# Patient Record
Sex: FEMALE | Race: OTHER | HISPANIC OR LATINO | Employment: UNEMPLOYED | URBAN - METROPOLITAN AREA
[De-identification: names, ages, dates, MRNs, and addresses within clinical notes are randomized per-mention and may not be internally consistent; named-entity substitution may affect disease eponyms.]

---

## 2018-08-14 ENCOUNTER — TELEPHONE (OUTPATIENT)
Dept: OBSTETRICS AND GYNECOLOGY | Facility: CLINIC | Age: 23
End: 2018-08-14

## 2018-08-14 ENCOUNTER — OFFICE VISIT (OUTPATIENT)
Dept: OBSTETRICS AND GYNECOLOGY | Facility: CLINIC | Age: 23
End: 2018-08-14
Payer: MEDICAID

## 2018-08-14 ENCOUNTER — LAB VISIT (OUTPATIENT)
Dept: LAB | Facility: HOSPITAL | Age: 23
End: 2018-08-14
Attending: OBSTETRICS & GYNECOLOGY
Payer: MEDICAID

## 2018-08-14 VITALS
HEIGHT: 65 IN | WEIGHT: 157.63 LBS | BODY MASS INDEX: 26.26 KG/M2 | DIASTOLIC BLOOD PRESSURE: 68 MMHG | SYSTOLIC BLOOD PRESSURE: 100 MMHG

## 2018-08-14 DIAGNOSIS — Z34.90 EARLY STAGE OF PREGNANCY: ICD-10-CM

## 2018-08-14 DIAGNOSIS — Z34.90 EARLY STAGE OF PREGNANCY: Primary | ICD-10-CM

## 2018-08-14 LAB
ANION GAP SERPL CALC-SCNC: 7 MMOL/L
BUN SERPL-MCNC: 7 MG/DL
CALCIUM SERPL-MCNC: 9.6 MG/DL
CHLORIDE SERPL-SCNC: 105 MMOL/L
CO2 SERPL-SCNC: 25 MMOL/L
CREAT SERPL-MCNC: 0.6 MG/DL
ERYTHROCYTE [DISTWIDTH] IN BLOOD BY AUTOMATED COUNT: 19.3 %
EST. GFR  (AFRICAN AMERICAN): >60 ML/MIN/1.73 M^2
EST. GFR  (NON AFRICAN AMERICAN): >60 ML/MIN/1.73 M^2
ESTIMATED AVG GLUCOSE: 94 MG/DL
GLUCOSE SERPL-MCNC: 80 MG/DL
HBA1C MFR BLD HPLC: 4.9 %
HCT VFR BLD AUTO: 34.6 %
HGB BLD-MCNC: 11.3 G/DL
HIV 1+2 AB+HIV1 P24 AG SERPL QL IA: NEGATIVE
MCH RBC QN AUTO: 26.8 PG
MCHC RBC AUTO-ENTMCNC: 32.7 G/DL
MCV RBC AUTO: 82 FL
PLATELET # BLD AUTO: 473 K/UL
PMV BLD AUTO: 10.2 FL
POTASSIUM SERPL-SCNC: 3.7 MMOL/L
RBC # BLD AUTO: 4.22 M/UL
SODIUM SERPL-SCNC: 137 MMOL/L
WBC # BLD AUTO: 11.86 K/UL

## 2018-08-14 PROCEDURE — 88175 CYTOPATH C/V AUTO FLUID REDO: CPT

## 2018-08-14 PROCEDURE — 85027 COMPLETE CBC AUTOMATED: CPT

## 2018-08-14 PROCEDURE — 99999 PR PBB SHADOW E&M-EST. PATIENT-LVL III: CPT | Mod: PBBFAC,,, | Performed by: OBSTETRICS & GYNECOLOGY

## 2018-08-14 PROCEDURE — 80048 BASIC METABOLIC PNL TOTAL CA: CPT

## 2018-08-14 PROCEDURE — 87340 HEPATITIS B SURFACE AG IA: CPT

## 2018-08-14 PROCEDURE — 86703 HIV-1/HIV-2 1 RESULT ANTBDY: CPT

## 2018-08-14 PROCEDURE — 87491 CHLMYD TRACH DNA AMP PROBE: CPT

## 2018-08-14 PROCEDURE — 99203 OFFICE O/P NEW LOW 30 MIN: CPT | Mod: S$PBB,TH,, | Performed by: OBSTETRICS & GYNECOLOGY

## 2018-08-14 PROCEDURE — 81220 CFTR GENE COM VARIANTS: CPT

## 2018-08-14 PROCEDURE — 87660 TRICHOMONAS VAGIN DIR PROBE: CPT

## 2018-08-14 PROCEDURE — 99213 OFFICE O/P EST LOW 20 MIN: CPT | Mod: PBBFAC,TH,PO | Performed by: OBSTETRICS & GYNECOLOGY

## 2018-08-14 PROCEDURE — 86803 HEPATITIS C AB TEST: CPT

## 2018-08-14 PROCEDURE — 83021 HEMOGLOBIN CHROMOTOGRAPHY: CPT

## 2018-08-14 PROCEDURE — 87086 URINE CULTURE/COLONY COUNT: CPT

## 2018-08-14 PROCEDURE — 86762 RUBELLA ANTIBODY: CPT

## 2018-08-14 PROCEDURE — 86592 SYPHILIS TEST NON-TREP QUAL: CPT

## 2018-08-14 PROCEDURE — 83036 HEMOGLOBIN GLYCOSYLATED A1C: CPT

## 2018-08-14 RX ORDER — ASPIRIN 81 MG/1
81 TABLET ORAL DAILY
Qty: 150 TABLET | Refills: 2 | Status: SHIPPED | OUTPATIENT
Start: 2018-08-14 | End: 2019-01-22

## 2018-08-14 NOTE — TELEPHONE ENCOUNTER
----- Message from Latonia Helm sent at 8/14/2018 12:37 PM CDT -----  Contact: aleshia javed [phone]  Pharmacist requested to speak with the nurse about changing the patient birth control to any other birth control the insurance cover. Please call and advise.

## 2018-08-14 NOTE — TELEPHONE ENCOUNTER
----- Message from Latonia Helm sent at 8/14/2018 12:37 PM CDT -----  Contact: aleshia javed [phone]  Pharmacist requested to speak with the nurse about changing the patient birth control to any other birth control the insurance cover. Please call and advise.

## 2018-08-14 NOTE — TELEPHONE ENCOUNTER
Spoke with Collin at [facility] pharmacy for a little clarification on message.   Patient is currently pregnant so she should not be taking ocp's. I was put on hold on for clarification then I noticed call was disconnected.   Patient was given list of OTC prenatals she can take.

## 2018-08-14 NOTE — PROGRESS NOTES
"OBSTETRICS /GYNECOLOGY     CC: Absence of menses / positive UPT at home     Radha Akbar is a 22 y.o. female  presents with complaint of absence of menstruation.    She denies nausea/vomIting/abdominal pain/bleeding.  UPT is positive.       Risks= Hx Preeclapmsia                Hx C/S x1                Declines  '        Past Medical History:   Diagnosis Date    Depression      Past Surgical History:   Procedure Laterality Date     SECTION       Social History     Socioeconomic History    Marital status: Significant Other     Spouse name: None    Number of children: None    Years of education: None    Highest education level: None   Social Needs    Financial resource strain: None    Food insecurity - worry: None    Food insecurity - inability: None    Transportation needs - medical: None    Transportation needs - non-medical: None   Occupational History    None   Tobacco Use    Smoking status: Never Smoker    Smokeless tobacco: Never Used   Substance and Sexual Activity    Alcohol use: No     Frequency: Never    Drug use: No    Sexual activity: Yes     Partners: Male   Other Topics Concern    None   Social History Narrative    None     Family History   Problem Relation Age of Onset    Diabetes Mother      OB History    Para Term  AB Living   3 1 1   1 1   SAB TAB Ectopic Multiple Live Births   1       1      # Outcome Date GA Lbr Oleksandr/2nd Weight Sex Delivery Anes PTL Lv   3 Current            2 Term 11/10/11 40w0d   M CS-Unspec   ALMA   1 SAB                   /68   Ht 5' 5" (1.651 m)   Wt 71.5 kg (157 lb 10.1 oz)   LMP 2018 (Approximate)   BMI 26.23 kg/m²     ROS:  GENERAL: Denies weight gain or weight loss. Feeling well overall.   SKIN: Denies rash or lesions.   HEAD: Denies head injury or headache.   NODES: Denies enlarged lymph nodes.   CHEST: Denies chest pain or shortness of breath.   CARDIOVASCULAR: Denies palpitations or left sided " chest pain.   ABDOMEN: No abdominal pain, constipation, diarrhea, nausea, vomiting or rectal bleeding.   URINARY: No frequency, dysuria, hematuria, or burning on urination.  REPRODUCTIVE: See HPI.   BREASTS: The patient performs breast self-examination and denies pain, lumps, or nipple discharge.   HEMATOLOGIC: No easy bruisability or excessive bleeding.   MUSCULOSKELETAL: Denies joint pain or swelling.   NEUROLOGIC: Denies syncope or weakness.   PSYCHIATRIC: Denies depression, anxiety or mood swings.    PE:   APPEARANCE: Well nourished, well developed, in no acute distress.  AFFECT: WNL, alert and oriented x 3.  SKIN: No acne or hirsutism.  NECK: Neck symmetric without masses or thyromegaly.  NODES: No inguinal, cervical, axillary or femoral lymph node enlargement.  CHEST: Good respiratory effort.   ABDOMEN: Soft. No tenderness or masses. No hepatosplenomegaly. No hernias.  BREASTS: Symmetrical, no skin changes or visible lesions. No palpable masses, nipple discharge bilaterally.  PELVIC: Normal external female genitalia without lesions. Normal hair distribution. Adequate perineal body, normal urethral meatus. Vagina moist and well rugated without lesions or discharge. Cervix pink, without lesions, discharge or tenderness. No significant cystocele or rectocele. Bimanual exam shows uterus is 8 weeks, regular, mobile and nontender. Adnexa without masses or tenderness.  EXTREMITIES: No edema.          ASSESSMENT and PLAN:    1-Pregnancy Examination test , positive    2-Hx  delivery   3-Hx preeclampsia previous pre    Prenatal Labs  Dating US  GC/CT  Affirm   PAP    Patient was counseled today on proper weight gain based on the [place] [employer]'s recommendations based on her pre-pregnancy weight. Discussed foods to avoid in pregnancy (i.e. sushi, fish that are high in mercury, deli meat, and unpasteurized cheeses). Discussed prenatal vitamin options (i.e. stool softener, DHA). Contingency screen offered -  patient desires.    Rx for ASA @12 weeks   Follow up in  4 weeks       Ascencion Fajardo M.D.   OB/GYN

## 2018-08-15 LAB
C TRACH DNA SPEC QL NAA+PROBE: DETECTED
CANDIDA RRNA VAG QL PROBE: NEGATIVE
G VAGINALIS RRNA GENITAL QL PROBE: POSITIVE
HBV SURFACE AG SERPL QL IA: NEGATIVE
HCV AB SERPL QL IA: NEGATIVE
N GONORRHOEA DNA SPEC QL NAA+PROBE: NOT DETECTED
RPR SER QL: NORMAL
RUBV IGG SER-ACNC: 14.2 IU/ML
RUBV IGG SER-IMP: REACTIVE
T VAGINALIS RRNA GENITAL QL PROBE: NEGATIVE

## 2018-08-15 NOTE — TELEPHONE ENCOUNTER
Spoke with pharmacist and confirmed ok to prescribe any prenatal vitamin covered by Medicaid as long as it has folic acid and iron.

## 2018-08-15 NOTE — TELEPHONE ENCOUNTER
----- Message from Leonardo Caban sent at 8/15/2018 10:29 AM CDT -----  Contact: Pili/[phone]  They needs to get the prenatal vitamin changed to something that is covered by medicaid.

## 2018-08-16 ENCOUNTER — TELEPHONE (OUTPATIENT)
Dept: OBSTETRICS AND GYNECOLOGY | Facility: CLINIC | Age: 23
End: 2018-08-16

## 2018-08-16 LAB — BACTERIA UR CULT: NORMAL

## 2018-08-16 RX ORDER — METRONIDAZOLE 500 MG/1
500 TABLET ORAL EVERY 12 HOURS
Qty: 14 TABLET | Refills: 0 | Status: SHIPPED | OUTPATIENT
Start: 2018-08-16 | End: 2018-08-23

## 2018-08-16 RX ORDER — AZITHROMYCIN 500 MG/1
1000 TABLET, FILM COATED ORAL ONCE
Qty: 2 TABLET | Refills: 0 | Status: SHIPPED | OUTPATIENT
Start: 2018-08-16 | End: 2018-08-16

## 2018-08-17 LAB
CFTR MUT ANL BLD/T: NORMAL
HGB A2 MFR BLD HPLC: 2.1 %
HGB FRACT BLD ELPH-IMP: ABNORMAL
HGB FRACT BLD ELPH-IMP: ABNORMAL

## 2018-08-17 NOTE — TELEPHONE ENCOUNTER
Cervical cultures resulted and reviewed:     GC /CT :Gonorrhea is negative     Chlamydia is positive   Rx sent for Azithromycin 1 gr PO x1    Please note that this is a sexually transmitted disease.  Sexual partner should be tested and treated if positive to   Prevent reinfection  Patient should be tested again in 6-8 weeks.    Affirm resulted  Positive for BV  Rx for flagyl sent to pharmacy on file  Please inform patient    Ascencion Fajardo M.D.   OB/GYN    Ascencion Fajardo M.D.   OB/GYN

## 2018-08-17 NOTE — TELEPHONE ENCOUNTER
Called and informed patient of cervical cultures results (+ Chlamydia/ + BV) Patient agreed and verbalized understanding.

## 2018-08-20 ENCOUNTER — PROCEDURE VISIT (OUTPATIENT)
Dept: OBSTETRICS AND GYNECOLOGY | Facility: CLINIC | Age: 23
End: 2018-08-20
Payer: MEDICAID

## 2018-08-20 DIAGNOSIS — Z36.89 ENCOUNTER TO ESTABLISH GESTATIONAL AGE USING ULTRASOUND: Primary | ICD-10-CM

## 2018-08-20 DIAGNOSIS — Z34.90 EARLY STAGE OF PREGNANCY: ICD-10-CM

## 2018-08-20 PROCEDURE — 76801 OB US < 14 WKS SINGLE FETUS: CPT | Mod: PBBFAC,PO

## 2018-08-20 PROCEDURE — 76801 OB US < 14 WKS SINGLE FETUS: CPT | Mod: 26,S$PBB,, | Performed by: OBSTETRICS & GYNECOLOGY

## 2018-08-27 ENCOUNTER — TELEPHONE (OUTPATIENT)
Dept: OBSTETRICS AND GYNECOLOGY | Facility: CLINIC | Age: 23
End: 2018-08-27

## 2018-08-27 NOTE — TELEPHONE ENCOUNTER
Called patient NO answer left a voice message to call us back for results.    PAP result reviewed\   Satisfactory specimen   Negative for malignancy   Please inform patient   RTC as scheduled   Ascencion Fajardo M.D.   OB/GYN

## 2018-09-11 ENCOUNTER — ROUTINE PRENATAL (OUTPATIENT)
Dept: OBSTETRICS AND GYNECOLOGY | Facility: CLINIC | Age: 23
End: 2018-09-11
Payer: MEDICAID

## 2018-09-11 VITALS — BODY MASS INDEX: 26.01 KG/M2 | DIASTOLIC BLOOD PRESSURE: 68 MMHG | SYSTOLIC BLOOD PRESSURE: 90 MMHG | WEIGHT: 156.31 LBS

## 2018-09-11 DIAGNOSIS — Z3A.14 14 WEEKS GESTATION OF PREGNANCY: Primary | ICD-10-CM

## 2018-09-11 PROCEDURE — 99999 PR PBB SHADOW E&M-EST. PATIENT-LVL II: CPT | Mod: PBBFAC,,, | Performed by: OBSTETRICS & GYNECOLOGY

## 2018-09-11 PROCEDURE — 99213 OFFICE O/P EST LOW 20 MIN: CPT | Mod: TH,S$PBB,, | Performed by: OBSTETRICS & GYNECOLOGY

## 2018-09-11 PROCEDURE — 99212 OFFICE O/P EST SF 10 MIN: CPT | Mod: PBBFAC,PO | Performed by: OBSTETRICS & GYNECOLOGY

## 2018-09-11 RX ORDER — AZITHROMYCIN 500 MG/1
1000 TABLET, FILM COATED ORAL ONCE
Qty: 2 TABLET | Refills: 0 | Status: SHIPPED | OUTPATIENT
Start: 2018-09-11 | End: 2018-09-11

## 2018-09-11 NOTE — PROGRESS NOTES
No complaints today   No nausea or vomiting   Denies vaginal bleeding or cramping     Prenatal labs reviewed  Aneuploidy screen  offered : Quad ordered today   General Pregnancy  recommendations given  PNV + H2O intake    Anatomy US at 20 weeks     Rx for ASA     FU in 4 weeks      Ascencion Fajardo M.D.   OB/GYN

## 2018-09-15 ENCOUNTER — HOSPITAL ENCOUNTER (EMERGENCY)
Facility: HOSPITAL | Age: 23
Discharge: HOME | End: 2018-09-16
Attending: EMERGENCY MEDICINE
Payer: MEDICAID

## 2018-09-15 DIAGNOSIS — R10.9 ABDOMINAL PAIN DURING PREGNANCY: ICD-10-CM

## 2018-09-15 DIAGNOSIS — O26.899 ABDOMINAL PAIN DURING PREGNANCY: ICD-10-CM

## 2018-09-15 LAB
BACTERIA #/AREA URNS HPF: ABNORMAL /HPF
BASOPHILS # BLD AUTO: 0.02 K/UL
BASOPHILS NFR BLD: 0.1 %
BILIRUB UR QL STRIP: NEGATIVE
CAOX CRY URNS QL MICRO: ABNORMAL
CLARITY UR: ABNORMAL
COLOR UR: YELLOW
DIFFERENTIAL METHOD: ABNORMAL
EOSINOPHIL # BLD AUTO: 0.1 K/UL
EOSINOPHIL NFR BLD: 0.6 %
ERYTHROCYTE [DISTWIDTH] IN BLOOD BY AUTOMATED COUNT: 16.6 %
GLUCOSE UR QL STRIP: ABNORMAL
HCT VFR BLD AUTO: 30.5 %
HGB BLD-MCNC: 10.4 G/DL
HGB UR QL STRIP: NEGATIVE
KETONES UR QL STRIP: NEGATIVE
LEUKOCYTE ESTERASE UR QL STRIP: ABNORMAL
LYMPHOCYTES # BLD AUTO: 3.7 K/UL
LYMPHOCYTES NFR BLD: 26.2 %
MCH RBC QN AUTO: 28.4 PG
MCHC RBC AUTO-ENTMCNC: 34.1 G/DL
MCV RBC AUTO: 83 FL
MICROSCOPIC COMMENT: ABNORMAL
MONOCYTES # BLD AUTO: 1 K/UL
MONOCYTES NFR BLD: 7.2 %
NEUTROPHILS # BLD AUTO: 9.3 K/UL
NEUTROPHILS NFR BLD: 65.9 %
NITRITE UR QL STRIP: NEGATIVE
PH UR STRIP: 8 [PH] (ref 5–8)
PLATELET # BLD AUTO: 403 K/UL
PMV BLD AUTO: 10.2 FL
PROT UR QL STRIP: NEGATIVE
RBC # BLD AUTO: 3.66 M/UL
RBC #/AREA URNS HPF: 8 /HPF (ref 0–4)
SP GR UR STRIP: 1.01 (ref 1–1.03)
SQUAMOUS #/AREA URNS HPF: 3 /HPF
URN SPEC COLLECT METH UR: ABNORMAL
UROBILINOGEN UR STRIP-ACNC: NEGATIVE EU/DL
WBC # BLD AUTO: 14.05 K/UL
WBC #/AREA URNS HPF: 10 /HPF (ref 0–5)

## 2018-09-15 PROCEDURE — 63600175 PHARM REV CODE 636 W HCPCS: Performed by: NURSE PRACTITIONER

## 2018-09-15 PROCEDURE — 85025 COMPLETE CBC W/AUTO DIFF WBC: CPT

## 2018-09-15 PROCEDURE — 80053 COMPREHEN METABOLIC PANEL: CPT

## 2018-09-15 PROCEDURE — 96361 HYDRATE IV INFUSION ADD-ON: CPT

## 2018-09-15 PROCEDURE — 87086 URINE CULTURE/COLONY COUNT: CPT

## 2018-09-15 PROCEDURE — 96374 THER/PROPH/DIAG INJ IV PUSH: CPT

## 2018-09-15 PROCEDURE — 25000003 PHARM REV CODE 250: Performed by: NURSE PRACTITIONER

## 2018-09-15 PROCEDURE — 99284 EMERGENCY DEPT VISIT MOD MDM: CPT | Mod: 25

## 2018-09-15 PROCEDURE — 81000 URINALYSIS NONAUTO W/SCOPE: CPT

## 2018-09-15 PROCEDURE — 86901 BLOOD TYPING SEROLOGIC RH(D): CPT

## 2018-09-15 PROCEDURE — 84702 CHORIONIC GONADOTROPIN TEST: CPT

## 2018-09-15 RX ORDER — ONDANSETRON 2 MG/ML
4 INJECTION INTRAMUSCULAR; INTRAVENOUS
Status: COMPLETED | OUTPATIENT
Start: 2018-09-15 | End: 2018-09-15

## 2018-09-15 RX ADMIN — ONDANSETRON 4 MG: 2 INJECTION INTRAMUSCULAR; INTRAVENOUS at 11:09

## 2018-09-15 RX ADMIN — SODIUM CHLORIDE 1000 ML: 0.9 INJECTION, SOLUTION INTRAVENOUS at 11:09

## 2018-09-16 VITALS
BODY MASS INDEX: 28.39 KG/M2 | TEMPERATURE: 98 F | SYSTOLIC BLOOD PRESSURE: 117 MMHG | RESPIRATION RATE: 18 BRPM | HEIGHT: 62 IN | DIASTOLIC BLOOD PRESSURE: 84 MMHG | WEIGHT: 154.31 LBS | OXYGEN SATURATION: 98 % | HEART RATE: 87 BPM

## 2018-09-16 LAB
ABO + RH BLD: NORMAL
ALBUMIN SERPL BCP-MCNC: 3.1 G/DL
ALP SERPL-CCNC: 68 U/L
ALT SERPL W/O P-5'-P-CCNC: 10 U/L
ANION GAP SERPL CALC-SCNC: 11 MMOL/L
AST SERPL-CCNC: 21 U/L
BACTERIA GENITAL QL WET PREP: ABNORMAL
BILIRUB SERPL-MCNC: 0.4 MG/DL
BUN SERPL-MCNC: 8 MG/DL
CALCIUM SERPL-MCNC: 9 MG/DL
CHLORIDE SERPL-SCNC: 111 MMOL/L
CLUE CELLS VAG QL WET PREP: ABNORMAL
CO2 SERPL-SCNC: 17 MMOL/L
CREAT SERPL-MCNC: 0.6 MG/DL
EST. GFR  (AFRICAN AMERICAN): >60 ML/MIN/1.73 M^2
EST. GFR  (NON AFRICAN AMERICAN): >60 ML/MIN/1.73 M^2
FILAMENT FUNGI VAG WET PREP-#/AREA: ABNORMAL
GLUCOSE SERPL-MCNC: 86 MG/DL
HCG INTACT+B SERPL-ACNC: NORMAL MIU/ML
POTASSIUM SERPL-SCNC: 3.4 MMOL/L
PROT SERPL-MCNC: 7 G/DL
SODIUM SERPL-SCNC: 139 MMOL/L
SPECIMEN SOURCE: ABNORMAL
T VAGINALIS GENITAL QL WET PREP: ABNORMAL
WBC #/AREA VAG WET PREP: ABNORMAL
YEAST GENITAL QL WET PREP: ABNORMAL

## 2018-09-16 PROCEDURE — 87210 SMEAR WET MOUNT SALINE/INK: CPT

## 2018-09-16 PROCEDURE — 25000003 PHARM REV CODE 250

## 2018-09-16 PROCEDURE — 87491 CHLMYD TRACH DNA AMP PROBE: CPT

## 2018-09-16 RX ORDER — CEPHALEXIN 500 MG/1
CAPSULE ORAL
Status: COMPLETED
Start: 2018-09-16 | End: 2018-09-16

## 2018-09-16 RX ADMIN — CEPHALEXIN: 500 CAPSULE ORAL at 03:09

## 2018-09-16 NOTE — ED TRIAGE NOTES
"Patient reports " I had a argument with the father of the baby, pt reports right side lower abdominal pain;pain come and goes, feels stabbing, vomiting, nausea, hand starting to sweat x today"  Denies taking medication, fever, chills, vaginal discharge or bleeding, urinary symptoms.   "

## 2018-09-18 LAB
BACTERIA UR CULT: NORMAL
C TRACH DNA SPEC QL NAA+PROBE: DETECTED
N GONORRHOEA DNA SPEC QL NAA+PROBE: NOT DETECTED

## 2018-09-19 ENCOUNTER — TELEPHONE (OUTPATIENT)
Dept: EMERGENCY MEDICINE | Facility: HOSPITAL | Age: 23
End: 2018-09-19

## 2018-09-19 RX ORDER — AZITHROMYCIN 1 G/1
1 POWDER, FOR SUSPENSION ORAL ONCE
Qty: 1 PACKET | Refills: 0 | Status: SHIPPED | OUTPATIENT
Start: 2018-09-19 | End: 2018-09-19

## 2018-09-19 NOTE — TELEPHONE ENCOUNTER
azithromycin (ZITHROMAX) 1 gram Pack 1 g, Once            Summary: Take 1 g by mouth once. for 1 dose, Starting Wed 9/19/2018, Normal   Dose, Route, Frequency: 1 g, Oral, Once  Start: 9/19/2018  End: 9/19/2018  Ord/Sold: 9/19/2018 (O)  Report  Adh:   Long-term:   Pharmacy: [facility] [address] [place] [address] [address] [facility] [address]  Med Dose History       Patient Sig: Take 1 g by mouth once. for 1 dose       Ordered on: 9/19/2018       Authorized by: ANA GOULD       Dispense: 1 packet       Refills: 0 ordered        Patient contacted via language line  service, results were discussed, azithromycin sent to patient's pharmacy.  All questions were answered.

## 2018-09-28 NOTE — ED PROVIDER NOTES
Encounter Date: 9/15/2018       History     Chief Complaint   Patient presents with    Abdominal Pain     pt is 14-15wks pregnant reports lower abd, also started vomiting around 6p     Chief complaint:  Abdominal pain    History of present illness:  Patient is a 22-year-old female who reports lower abdominal pain on the right side since 18:00 today.  Reports pain is intermittent and aching.  Has taken no medications or treatments for this issue.  Reports associated vomiting and current severity pain is 6/10.  Last menstrual period was May 25, 2018 she is currently 14-15 weeks pregnant.      The history is provided by the patient. A  was used ([employer] [id number]).     Review of patient's allergies indicates:  No Known Allergies  Past Medical History:   Diagnosis Date    Depression      Past Surgical History:   Procedure Laterality Date     SECTION       Family History   Problem Relation Age of Onset    Diabetes Mother      Social History     Tobacco Use    Smoking status: Never Smoker    Smokeless tobacco: Never Used   Substance Use Topics    Alcohol use: No     Frequency: Never    Drug use: No     Review of Systems   Constitutional: Negative for chills, fatigue and fever.   HENT: Negative for congestion, ear discharge, ear pain, postnasal drip, rhinorrhea, sinus pressure, sneezing, sore throat and voice change.    Eyes: Negative for discharge and itching.   Respiratory: Negative for cough, shortness of breath and wheezing.    Cardiovascular: Negative for chest pain, palpitations and leg swelling.   Gastrointestinal: Positive for abdominal pain. Negative for constipation, diarrhea, nausea and vomiting.   Endocrine: Negative for polydipsia, polyphagia and polyuria.   Genitourinary: Positive for frequency and urgency. Negative for dysuria, hematuria, vaginal bleeding, vaginal discharge and vaginal pain.   Musculoskeletal: Negative for arthralgias and myalgias.   Skin: Negative for  rash and wound.   Neurological: Negative for dizziness, seizures, syncope, weakness and numbness.   Hematological: Negative for adenopathy. Does not bruise/bleed easily.   Psychiatric/Behavioral: Negative for self-injury and suicidal ideas. The patient is not nervous/anxious.        Physical Exam     Initial Vitals [09/15/18 2242]   BP Pulse Resp Temp SpO2   122/76 101 18 97.6 °F (36.4 °C) 96 %      MAP       --         Physical Exam    Nursing note and vitals reviewed.  Constitutional: She appears well-developed and well-nourished.   HENT:   Head: Normocephalic and atraumatic.   Right Ear: External ear normal.   Left Ear: External ear normal.   Nose: Nose normal.   Eyes: Conjunctivae and EOM are normal. Pupils are equal, round, and reactive to light. Right eye exhibits no discharge. Left eye exhibits no discharge.   Neck: Normal range of motion.   Abdominal: She exhibits no distension. Hernia confirmed negative in the right inguinal area and confirmed negative in the left inguinal area.   Genitourinary: Vagina normal and uterus normal. Pelvic exam was performed with patient prone. No labial fusion. There is no rash, tenderness, lesion or injury on the right labia. There is no rash, tenderness, lesion or injury on the left labia. Uterus is not deviated, not enlarged, not fixed and not tender. Cervix exhibits discharge (white, thin). Cervix exhibits no motion tenderness and no friability. Right adnexum displays no mass, no tenderness and no fullness. Left adnexum displays no mass, no tenderness and no fullness. No erythema, tenderness or bleeding in the vagina. No foreign body in the vagina. No signs of injury around the vagina. No vaginal discharge found.   Musculoskeletal: Normal range of motion.   Lymphadenopathy:        Right: No inguinal adenopathy present.        Left: No inguinal adenopathy present.   Neurological: She is alert and oriented to person, place, and time.   Skin: Skin is dry. Capillary refill  takes less than 2 seconds.         ED Course   Procedures  Labs Reviewed   C. TRACHOMATIS/N. GONORRHOEAE BY AMP DNA - Abnormal; Notable for the following components:       Result Value    Chlamydia, Amplified DNA Detected (*)     All other components within normal limits   CBC W/ AUTO DIFFERENTIAL - Abnormal; Notable for the following components:    WBC 14.05 (*)     RBC 3.66 (*)     Hemoglobin 10.4 (*)     Hematocrit 30.5 (*)     RDW 16.6 (*)     Platelets 403 (*)     Gran # (ANC) 9.3 (*)     All other components within normal limits   COMPREHENSIVE METABOLIC PANEL - Abnormal; Notable for the following components:    Potassium 3.4 (*)     Chloride 111 (*)     CO2 17 (*)     Albumin 3.1 (*)     All other components within normal limits   VAGINAL SCREEN - Abnormal; Notable for the following components:    WBC - Vaginal Screen Occasional (*)     Bacteria - Vaginal Screen Few (*)     All other components within normal limits   URINALYSIS, REFLEX TO URINE CULTURE - Abnormal; Notable for the following components:    Appearance, UA Hazy (*)     Glucose, UA 2+ (*)     Leukocytes, UA Trace (*)     All other components within normal limits   URINALYSIS MICROSCOPIC - Abnormal; Notable for the following components:    RBC, UA 8 (*)     WBC, UA 10 (*)     Bacteria, UA Few (*)     Ca Oxalate Ayleen, UA Many (*)     All other components within normal limits   CULTURE, URINE   HCG, QUANTITATIVE, PREGNANCY   GROUP & RH          Imaging Results          US OB More Than 14 Wks First Gestation (In process)                  Medical Decision Making:   Initial Assessment:   22-year-old female who was 14-15 weeks gestation by personal report an LMP with urinary frequency and urgency, abdominal pain  Differential Diagnosis:   UTI, STD, threatened AB, placenta previa  ED Management:  Patient's blood type is O-positive, no need for RhoGAM at this time.  Vaginal screen was negative for abnormality.  UA demonstrated 10 WBCs per high-power field  indicated the need for treatment.  CBC with white blood cell count of 14.05 an H&H of 10.430.5 indicating mild anemia as well as possibility of infection.  A CT of 40,817 appropriate for this gestational age.US reveals single IUP with rate of 152.  During visit patient was given Keflex 500 mg for urinary tract infection, 1 L normal saline, Zofran 4 mg for nausea.  She was discharged home in good condition to follow up with her OBGYN, rx for keflex 500mg po bid was given.  She understands she should take no medications other than over-the-counter Tylenol.  Other:   I have discussed this case with another health care provider.       <> Summary of the Discussion: Care of the patient discussed with Dr. Stephens who agreed with the assessment and plan.                    ED Course as of Sep 28 1608   Sat Sep 15, 2018   2248 BP: 122/76 [VC]   2248 Temp: 97.6 °F (36.4 °C) [VC]   2248 Pulse: 101 [VC]   2248 Resp: 18 [VC]   2248 SpO2: 96 % [VC]   2353 CBC: leukocyte count was increased, the H&H was reduced. The platelet count was increased. This indicates possible infection, anemia.      [VC]   Sun Sep 16, 2018   0002 UTI present on UA aeb 10wbc/hpf and 8rbc/hpf. 2+ glucose present, will await glucose on CMP.  [VC]   0031 Vaginal screen is negative for tricomonas, clue cells (indicating no bv), yeast, or excess bacteria    [VC]   0032 Correlates to 16 week gestation by LMP of 5/25/18   hCG Quant: 93774 [VC]   0104 No need for rhogam.  GC/CT culture pending at time of disposition. Group & Rh: O POS [VC]   0105 CMP indicates hypokalemia.  CO2 is decreased.  [VC]   0105 Hypoalbunemia present on cmp, will emphasize high protein diet.  [VC]   0120 Awaiting Ultrasound and results.  [VC]   Pt encounter occurred during downtime, portions of the chart may be found under media tab.   ED Course User Index  [VC] Richy Mohamud, ELLIS     Clinical Impression:   The encounter diagnosis was Abdominal pain during  pregnancy.      Disposition:   Disposition: Discharged  Condition: Stable                        Richy Mohamud, ELLIS  09/28/18 1711

## 2018-10-16 ENCOUNTER — PROCEDURE VISIT (OUTPATIENT)
Dept: OBSTETRICS AND GYNECOLOGY | Facility: CLINIC | Age: 23
End: 2018-10-16
Payer: MEDICAID

## 2018-10-16 ENCOUNTER — ROUTINE PRENATAL (OUTPATIENT)
Dept: OBSTETRICS AND GYNECOLOGY | Facility: CLINIC | Age: 23
End: 2018-10-16
Payer: MEDICAID

## 2018-10-16 VITALS — DIASTOLIC BLOOD PRESSURE: 68 MMHG | SYSTOLIC BLOOD PRESSURE: 92 MMHG | WEIGHT: 159.19 LBS | BODY MASS INDEX: 29.11 KG/M2

## 2018-10-16 DIAGNOSIS — Z3A.14 14 WEEKS GESTATION OF PREGNANCY: ICD-10-CM

## 2018-10-16 DIAGNOSIS — Z34.82 ENCOUNTER FOR SUPERVISION OF OTHER NORMAL PREGNANCY IN SECOND TRIMESTER: ICD-10-CM

## 2018-10-16 DIAGNOSIS — Z3A.19 19 WEEKS GESTATION OF PREGNANCY: Primary | ICD-10-CM

## 2018-10-16 DIAGNOSIS — O09.292 HX OF PREECLAMPSIA, PRIOR PREGNANCY, CURRENTLY PREGNANT, SECOND TRIMESTER: ICD-10-CM

## 2018-10-16 DIAGNOSIS — Z36.3 ANTENATAL SCREENING FOR MALFORMATION USING ULTRASONICS: Primary | ICD-10-CM

## 2018-10-16 PROCEDURE — 99999 PR PBB SHADOW E&M-EST. PATIENT-LVL II: CPT | Mod: PBBFAC,,, | Performed by: OBSTETRICS & GYNECOLOGY

## 2018-10-16 PROCEDURE — 99212 OFFICE O/P EST SF 10 MIN: CPT | Mod: PBBFAC,TH,PO,25 | Performed by: OBSTETRICS & GYNECOLOGY

## 2018-10-16 PROCEDURE — 76805 OB US >/= 14 WKS SNGL FETUS: CPT | Mod: PBBFAC,PO

## 2018-10-16 PROCEDURE — 76805 OB US >/= 14 WKS SNGL FETUS: CPT | Mod: 26,S$PBB,, | Performed by: OBSTETRICS & GYNECOLOGY

## 2018-10-16 PROCEDURE — 99213 OFFICE O/P EST LOW 20 MIN: CPT | Mod: S$PBB,TH,25, | Performed by: OBSTETRICS & GYNECOLOGY

## 2018-10-16 NOTE — PROGRESS NOTES
No complaints today   No nausea or vomiting   Denies vaginal bleeding or cramping     Prenatal labs reviewed  Aneuploidy screen  offered : Quad ordered today   General Pregnancy  recommendations given  PNV + H2O intake    Anatomy US at 20 weeks     Rx for ASA ( she is taking them )     FU in 4 weeks      Ascencion Fajardo M.D.   OB/GYN

## 2018-11-13 ENCOUNTER — ROUTINE PRENATAL (OUTPATIENT)
Dept: OBSTETRICS AND GYNECOLOGY | Facility: CLINIC | Age: 23
End: 2018-11-13
Payer: MEDICAID

## 2018-11-13 VITALS — SYSTOLIC BLOOD PRESSURE: 100 MMHG | DIASTOLIC BLOOD PRESSURE: 72 MMHG | BODY MASS INDEX: 30 KG/M2 | WEIGHT: 164 LBS

## 2018-11-13 DIAGNOSIS — O09.292 HX OF PREECLAMPSIA, PRIOR PREGNANCY, CURRENTLY PREGNANT, SECOND TRIMESTER: ICD-10-CM

## 2018-11-13 DIAGNOSIS — Z3A.23 23 WEEKS GESTATION OF PREGNANCY: ICD-10-CM

## 2018-11-13 DIAGNOSIS — R30.0 DYSURIA: Primary | ICD-10-CM

## 2018-11-13 DIAGNOSIS — Z34.82 ENCOUNTER FOR SUPERVISION OF OTHER NORMAL PREGNANCY IN SECOND TRIMESTER: ICD-10-CM

## 2018-11-13 DIAGNOSIS — Z3A.27 27 WEEKS GESTATION OF PREGNANCY: ICD-10-CM

## 2018-11-13 PROCEDURE — 99212 OFFICE O/P EST SF 10 MIN: CPT | Mod: PBBFAC,TH,PO | Performed by: OBSTETRICS & GYNECOLOGY

## 2018-11-13 PROCEDURE — 99999 PR PBB SHADOW E&M-EST. PATIENT-LVL II: CPT | Mod: PBBFAC,,, | Performed by: OBSTETRICS & GYNECOLOGY

## 2018-11-13 PROCEDURE — 87086 URINE CULTURE/COLONY COUNT: CPT

## 2018-11-13 PROCEDURE — 99213 OFFICE O/P EST LOW 20 MIN: CPT | Mod: S$PBB,TH,, | Performed by: OBSTETRICS & GYNECOLOGY

## 2018-11-13 RX ORDER — FOLIC ACID, .BETA.-CAROTENE, ASCORBIC ACID, CHOLECALCIFEROL, .ALPHA.-TOCOPHEROL ACETATE, DL-, THIAMINE MONONITRATE, RIBOFLAVIN, NIACINAMIDE, PYRIDOXINE HYDROCHLORIDE, CYANOCOBALAMIN, CALCIUM PANTOTHENATE, CALCIUM CARBONATE, FERROUS FUMARATE, AND ZINC OXIDE 1; 1000; 100; 400; 30; 3; 3; 15; 20; 12; 7; 200; 29; 20 MG/1; [IU]/1; MG/1; [IU]/1; [IU]/1; MG/1; MG/1; MG/1; MG/1; UG/1; MG/1; MG/1; MG/1; MG/1
TABLET, CHEWABLE ORAL
Refills: 3 | COMMUNITY
Start: 2018-10-23 | End: 2019-03-13

## 2018-11-13 RX ORDER — CEPHALEXIN 250 MG/1
250 CAPSULE ORAL 4 TIMES DAILY
Qty: 28 CAPSULE | Refills: 0 | Status: ON HOLD | OUTPATIENT
Start: 2018-11-13 | End: 2018-11-18 | Stop reason: HOSPADM

## 2018-11-13 NOTE — PROGRESS NOTES
No complaints today   No nausea or vomiting   Denies vaginal bleeding or cramping     Prenatal labs reviewed  Aneuploidy screen  offered : Quad ordered today   General Pregnancy  recommendations given  PNV + H2O intake    Anatomy US at 20 weeks  Done normal     FU in 4 weeks with Diabetes screen   (+) nitrates   Rx for Keflex = UTI   Flu  shot today     Ascencion Fajardo M.D.   OB/GYN

## 2018-11-14 ENCOUNTER — IMMUNIZATION (OUTPATIENT)
Dept: PHARMACY | Facility: HOSPITAL | Age: 23
End: 2018-11-14
Payer: MEDICAID

## 2018-11-15 LAB — BACTERIA UR CULT: NORMAL

## 2018-11-17 ENCOUNTER — HOSPITAL ENCOUNTER (OUTPATIENT)
Facility: HOSPITAL | Age: 23
Discharge: HOME | End: 2018-11-18
Attending: OBSTETRICS & GYNECOLOGY | Admitting: OBSTETRICS & GYNECOLOGY
Payer: MEDICAID

## 2018-11-17 DIAGNOSIS — O23.02 PYELONEPHRITIS AFFECTING PREGNANCY IN SECOND TRIMESTER: ICD-10-CM

## 2018-11-17 DIAGNOSIS — Z34.02 ENCOUNTER FOR SUPERVISION OF NORMAL FIRST PREGNANCY IN SECOND TRIMESTER: Primary | ICD-10-CM

## 2018-11-17 DIAGNOSIS — Z34.90 PREGNANCY: ICD-10-CM

## 2018-11-17 LAB
ALBUMIN SERPL BCP-MCNC: 2.9 G/DL
ALP SERPL-CCNC: 117 U/L
ALT SERPL W/O P-5'-P-CCNC: 9 U/L
ANION GAP SERPL CALC-SCNC: 9 MMOL/L
AST SERPL-CCNC: 16 U/L
BACTERIA #/AREA URNS HPF: ABNORMAL /HPF
BASOPHILS # BLD AUTO: 0.02 K/UL
BASOPHILS NFR BLD: 0.1 %
BILIRUB SERPL-MCNC: 0.5 MG/DL
BILIRUB UR QL STRIP: NEGATIVE
BUN SERPL-MCNC: 5 MG/DL
CALCIUM SERPL-MCNC: 9.2 MG/DL
CHLORIDE SERPL-SCNC: 107 MMOL/L
CLARITY UR: CLEAR
CO2 SERPL-SCNC: 19 MMOL/L
COLOR UR: YELLOW
CREAT SERPL-MCNC: 0.7 MG/DL
DIFFERENTIAL METHOD: ABNORMAL
EOSINOPHIL # BLD AUTO: 0.1 K/UL
EOSINOPHIL NFR BLD: 0.7 %
ERYTHROCYTE [DISTWIDTH] IN BLOOD BY AUTOMATED COUNT: 14.6 %
EST. GFR  (AFRICAN AMERICAN): >60 ML/MIN/1.73 M^2
EST. GFR  (NON AFRICAN AMERICAN): >60 ML/MIN/1.73 M^2
FIBRONECTIN FETAL SPEC QL: POSITIVE
GLUCOSE SERPL-MCNC: 157 MG/DL
GLUCOSE UR QL STRIP: NEGATIVE
HCT VFR BLD AUTO: 31.4 %
HGB BLD-MCNC: 10.5 G/DL
HGB UR QL STRIP: ABNORMAL
HYALINE CASTS #/AREA URNS LPF: ABNORMAL /LPF
KETONES UR QL STRIP: NEGATIVE
LEUKOCYTE ESTERASE UR QL STRIP: ABNORMAL
LYMPHOCYTES # BLD AUTO: 3 K/UL
LYMPHOCYTES NFR BLD: 16.1 %
MCH RBC QN AUTO: 29.2 PG
MCHC RBC AUTO-ENTMCNC: 33.4 G/DL
MCV RBC AUTO: 87 FL
MICROSCOPIC COMMENT: ABNORMAL
MONOCYTES # BLD AUTO: 1 K/UL
MONOCYTES NFR BLD: 5.2 %
NEUTROPHILS # BLD AUTO: 14.7 K/UL
NEUTROPHILS NFR BLD: 77.9 %
NITRITE UR QL STRIP: POSITIVE
PH UR STRIP: 6 [PH] (ref 5–8)
PLATELET # BLD AUTO: 394 K/UL
PMV BLD AUTO: 10 FL
POCT GLUCOSE: 133 MG/DL (ref 70–110)
POTASSIUM SERPL-SCNC: 3.3 MMOL/L
PROT SERPL-MCNC: 6.8 G/DL
PROT UR QL STRIP: ABNORMAL
RBC # BLD AUTO: 3.6 M/UL
RBC #/AREA URNS HPF: 2 /HPF (ref 0–4)
SODIUM SERPL-SCNC: 135 MMOL/L
SP GR UR STRIP: 1.01 (ref 1–1.03)
SQUAMOUS #/AREA URNS HPF: 3 /HPF
URN SPEC COLLECT METH UR: ABNORMAL
UROBILINOGEN UR STRIP-ACNC: NEGATIVE EU/DL
WBC # BLD AUTO: 18.91 K/UL
WBC #/AREA URNS HPF: 80 /HPF (ref 0–5)
WBC CLUMPS URNS QL MICRO: ABNORMAL

## 2018-11-17 PROCEDURE — 36415 COLL VENOUS BLD VENIPUNCTURE: CPT

## 2018-11-17 PROCEDURE — 99211 OFF/OP EST MAY X REQ PHY/QHP: CPT | Mod: 25

## 2018-11-17 PROCEDURE — 82731 ASSAY OF FETAL FIBRONECTIN: CPT

## 2018-11-17 PROCEDURE — 25000003 PHARM REV CODE 250: Performed by: OBSTETRICS & GYNECOLOGY

## 2018-11-17 PROCEDURE — 81000 URINALYSIS NONAUTO W/SCOPE: CPT

## 2018-11-17 PROCEDURE — 85025 COMPLETE CBC W/AUTO DIFF WBC: CPT

## 2018-11-17 PROCEDURE — 80053 COMPREHEN METABOLIC PANEL: CPT

## 2018-11-17 PROCEDURE — 63600175 PHARM REV CODE 636 W HCPCS: Performed by: OBSTETRICS & GYNECOLOGY

## 2018-11-17 PROCEDURE — 87086 URINE CULTURE/COLONY COUNT: CPT

## 2018-11-17 RX ORDER — PSEUDOEPHEDRINE HCL 30 MG
60 TABLET ORAL EVERY 6 HOURS PRN
Status: DISCONTINUED | OUTPATIENT
Start: 2018-11-17 | End: 2018-11-18 | Stop reason: HOSPADM

## 2018-11-17 RX ORDER — SODIUM CHLORIDE, SODIUM LACTATE, POTASSIUM CHLORIDE, CALCIUM CHLORIDE 600; 310; 30; 20 MG/100ML; MG/100ML; MG/100ML; MG/100ML
INJECTION, SOLUTION INTRAVENOUS CONTINUOUS
Status: DISCONTINUED | OUTPATIENT
Start: 2018-11-17 | End: 2018-11-18 | Stop reason: HOSPADM

## 2018-11-17 RX ORDER — OXYCODONE AND ACETAMINOPHEN 5; 325 MG/1; MG/1
2 TABLET ORAL EVERY 4 HOURS PRN
Status: DISCONTINUED | OUTPATIENT
Start: 2018-11-17 | End: 2018-11-18 | Stop reason: HOSPADM

## 2018-11-17 RX ORDER — GUAIFENESIN 100 MG/5ML
200 SOLUTION ORAL EVERY 4 HOURS PRN
Status: DISCONTINUED | OUTPATIENT
Start: 2018-11-17 | End: 2018-11-18 | Stop reason: HOSPADM

## 2018-11-17 RX ORDER — OXYCODONE AND ACETAMINOPHEN 5; 325 MG/1; MG/1
1 TABLET ORAL EVERY 4 HOURS PRN
Status: DISCONTINUED | OUTPATIENT
Start: 2018-11-17 | End: 2018-11-18 | Stop reason: HOSPADM

## 2018-11-17 RX ADMIN — GUAIFENESIN 200 MG: 200 SOLUTION ORAL at 11:11

## 2018-11-17 RX ADMIN — OXYCODONE AND ACETAMINOPHEN 1 TABLET: 5; 325 TABLET ORAL at 05:11

## 2018-11-17 RX ADMIN — GUAIFENESIN 200 MG: 200 SOLUTION ORAL at 12:11

## 2018-11-17 RX ADMIN — CEFTRIAXONE 1 G: 1 INJECTION, SOLUTION INTRAVENOUS at 04:11

## 2018-11-17 RX ADMIN — SODIUM CHLORIDE, SODIUM LACTATE, POTASSIUM CHLORIDE, AND CALCIUM CHLORIDE: .6; .31; .03; .02 INJECTION, SOLUTION INTRAVENOUS at 05:11

## 2018-11-17 RX ADMIN — OXYCODONE AND ACETAMINOPHEN 1 TABLET: 5; 325 TABLET ORAL at 04:11

## 2018-11-17 RX ADMIN — SODIUM CHLORIDE, SODIUM LACTATE, POTASSIUM CHLORIDE, AND CALCIUM CHLORIDE 1000 ML: .6; .31; .03; .02 INJECTION, SOLUTION INTRAVENOUS at 03:11

## 2018-11-17 NOTE — TREATMENT PLAN
Pt to unit c/o left flank and LLQ pain.  Denies contractions, fetus is very active, denies burning with urination.  She was given keflex prescription on 11/13 for UTI and is still taking medication

## 2018-11-17 NOTE — H&P
Obstetrics      SUBJECTIVE:     Radha Akbar is a 23 y.o.  female at 24w2d presented with c/o back pain.  She was recently prescribed antibiotics for a bladder infection for which she is taking.  She is a patient of Dr. ARI Fajardo.  Review of the chart show +nitrites on 18.  She was started on keflex, however, urine culture was neg.  Denies contractions, BV or ROM.  +FM    Patient also reports a cold for the last few days with cough starting the morning.  Pregnancy has been otherwise uncomplicated.    Patient Active Problem List   Diagnosis    Pregnancy         PTA Medications   Medication Sig    aspirin (ECOTRIN) 81 MG EC tablet Take 1 tablet (81 mg total) by mouth once daily.    cephALEXin (KEFLEX) 250 MG capsule Take 1 capsule (250 mg total) by mouth 4 (four) times daily. for 7 days    flu vac [id number] 36mos up,PF, 60 mcg (15 mcg x 4)/0.5 mL Syrg To be administered by the pharmacist. into the muscle    PRENATAL 19 29 mg iron- 1 mg Chew CHEW 1 T PO  ONCE DAILY       Review of patient's allergies indicates:  No Known Allergies     Past Medical History:   Diagnosis Date    Depression      Past Surgical History:   Procedure Laterality Date     SECTION       Family History   Problem Relation Age of Onset    Diabetes Mother      Social History     Tobacco Use    Smoking status: Never Smoker    Smokeless tobacco: Never Used   Substance Use Topics    Alcohol use: No     Frequency: Never    Drug use: No        OBJECTIVE:     Vital Signs (Most Recent)  Temp: 98.7 °F (37.1 °C) (18 0745)  Pulse: 108 (18 0827)  Resp: 18 (18 0428)  BP: (!) 116/58 (18 0827)    Physical Exam:  General:  Normal, alert and oriented   Abdomen:  Back Gravid no fundal tenderness  Left CVA tenderness     Uterine Size:  c/w dates   FHT: NST this morning   Cervix:     Dilation: deferred    Effacement:     Station:                 Laboratory:  See results console  ASSESSMENT/PLAN:     24w2d  gestation.  Pyelonephritis - urine culture sent.  Continue Rocephin  URI sudafed, robitussin prn  Previous c/s  H/o pre-eclampsia on ASA  +FFN - unclear importance in the setting of on contractions.  Will monitor for any s/s PTL  Glucose - random 157 on admit, not yet tested for gestational DM, will check accu check now

## 2018-11-18 VITALS
SYSTOLIC BLOOD PRESSURE: 110 MMHG | BODY MASS INDEX: 30.36 KG/M2 | TEMPERATURE: 98 F | HEIGHT: 62 IN | WEIGHT: 165 LBS | DIASTOLIC BLOOD PRESSURE: 60 MMHG | RESPIRATION RATE: 18 BRPM | HEART RATE: 93 BPM

## 2018-11-18 PROBLEM — R87.89 POSITIVE FETAL FIBRONECTIN AT 22 WEEKS TO 34 WEEKS GESTATION: Status: ACTIVE | Noted: 2018-11-18

## 2018-11-18 PROBLEM — O23.02 PYELONEPHRITIS AFFECTING PREGNANCY IN SECOND TRIMESTER: Status: ACTIVE | Noted: 2018-11-18

## 2018-11-18 PROBLEM — R73.9 HYPERGLYCEMIA: Status: ACTIVE | Noted: 2018-11-18

## 2018-11-18 PROBLEM — O09.899 POSITIVE FETAL FIBRONECTIN AT 22 WEEKS TO 34 WEEKS GESTATION: Status: ACTIVE | Noted: 2018-11-18

## 2018-11-18 LAB — POCT GLUCOSE: 101 MG/DL (ref 70–110)

## 2018-11-18 PROCEDURE — 63600175 PHARM REV CODE 636 W HCPCS: Performed by: OBSTETRICS & GYNECOLOGY

## 2018-11-18 RX ORDER — NITROFURANTOIN 25; 75 MG/1; MG/1
100 CAPSULE ORAL 2 TIMES DAILY
Qty: 10 CAPSULE | Refills: 0 | Status: SHIPPED | OUTPATIENT
Start: 2018-11-18 | End: 2018-11-23

## 2018-11-18 RX ORDER — POTASSIUM CHLORIDE 750 MG/1
20 TABLET, EXTENDED RELEASE ORAL DAILY
Qty: 10 TABLET | Refills: 0 | Status: SHIPPED | OUTPATIENT
Start: 2018-11-18 | End: 2018-11-23

## 2018-11-18 RX ADMIN — CEFTRIAXONE 1 G: 1 INJECTION, SOLUTION INTRAVENOUS at 04:11

## 2018-11-18 NOTE — PROGRESS NOTES
Discharge instructions given to pt with use of language line. Pt verbalized understanding with no questions at this time.

## 2018-11-18 NOTE — PROGRESS NOTES
Progress Note  Obstetrics & Gynecology      SUBJECTIVE:   HD#2 admitted with pyelonephritis  Back pain resolved.  Reports nasal congestion, no other sx  No ctx or VB +FM      Patient Active Problem List   Diagnosis    Pregnancy       Current Facility-Administered Medications:     cefTRIAXone (ROCEPHIN) 1 g in dextrose 5 % 50 mL IVPB, 1 g, Intravenous, Q24H, Steph Cano MD, 1 g at 18 0416    guaifenesin 100 mg/5 ml syrup 200 mg, 200 mg, Oral, Q4H PRN, Steph Cano MD, 200 mg at 18 2306    lactated ringers infusion, , Intravenous, Continuous, Cat Singer MD, Last Rate: 125 mL/hr at 18 0541    oxyCODONE-acetaminophen 5-325 mg per tablet 1 tablet, 1 tablet, Oral, Q4H PRN, Cat Singer MD, 1 tablet at 18 1717    oxyCODONE-acetaminophen 5-325 mg per tablet 2 tablet, 2 tablet, Oral, Q4H PRN, Cat Singer MD    pseudoephedrine tablet 60 mg, 60 mg, Oral, Q6H PRN, Steph Cano MD       OBJECTIVE:     Vital Signs (Most Recent)  Temp:  [98.2 °F (36.8 °C)-99.4 °F (37.4 °C)] 98.2 °F (36.8 °C)  Pulse:  [] 93  Resp:  [18] 18  BP: (110-118)/(60-78) 110/60  EFM cat 1  [place] no ctx    Physical Exam:  General:  Neck:  CV:  Lungs:  Extremeties: Alert and Oriented x 3, NAD, Well nourished female  Supple, No LAD  Regular rate  Normal respiratory effort  Nt, no significant assymetric swelling   Back no CVAT   Abdomen: Soft, nt/nd                 Pelvic:               External WNL, no lesions noted                     Uterus No masses palpated, nt         Cervix/Vagina No CMT, no masses/lesions noted                    Adnexa No masses noted, NT.      Laboratory:  Recent Labs   Lab 18  0351   WBC 18.91*   HGB 10.5*   HCT 31.4*   *         ASSESSMENT/PLAN:     23 y.o.    1) Pyelonephritis - resolving.  No CVAT or fever.  Urine cx pending, but culture from  no growth.  Will d/c home with Rx Macrobid to complete 7  days of therapy.  Advised continuation of Macrodantin as prophylaxis for remainder of pregnancy, but Dr. Fajardo can prescribe this  2) URI - Advised Sudafed/Robitussin prn  3) hypokalemia -replace  4) mildly elevated glucoses - scheduled for DM screen at next visit  5) Positive fetal fibronectin - no ctx and cervix closed.  Consider repeating in office.

## 2018-11-18 NOTE — DISCHARGE SUMMARY
Discharge Note    Admit Date: 11/17/2018    Attending Physician: Cat Singer*     Discharge Physician: Cat Singer*    Final Diagnosis: IUP @ 24 weeks, pyelonephritis, URI    Secondary diagnoses: FFN positive, hypokalemia, hyperglycemia    Hospital course: admitted 11/16/18 with back pain and CVAT, no fever.  Was taking Keflex for UTI.  Placed on Rocephin and improved with no CVAT on HD#3.  Potassium was noted to be slightly low and this was replaced on d/c.  Glucoses somewhat elevated, however fasting was 101 on d/c and pt scheduled for DM screen.  Pt with URI sx also, and conservative measures discussed.  Also pt had +FFN, but no ctx or cervical change.    Disposition: Home or Self Care    Patient Instructions:   Current Discharge Medication List      CONTINUE these medications which have NOT CHANGED    Details   aspirin (ECOTRIN) 81 MG EC tablet Take 1 tablet (81 mg total) by mouth once daily.  Qty: 150 tablet, Refills: 2      cephALEXin (KEFLEX) 250 MG capsule Take 1 capsule (250 mg total) by mouth 4 (four) times daily. for 7 days  Qty: 28 capsule, Refills: 0      flu vac [id number] 36mos up,PF, 60 mcg (15 mcg x 4)/0.5 mL Syrg To be administered by the pharmacist. into the muscle  Qty: 0.5 mL, Refills: 0      PRENATAL 19 29 mg iron- 1 mg Chew CHEW 1 T PO  ONCE DAILY  Refills: 3             Discharge Procedure Orders (must include Diet, Follow-up, Activity)  No discharge procedures on file.     Diet: Regular    Activity: Pelvic rest    Follow up: 1 week    Discharge Date: No discharge date for patient encounter.

## 2018-11-19 LAB — BACTERIA UR CULT: NORMAL

## 2018-12-11 ENCOUNTER — LAB VISIT (OUTPATIENT)
Dept: LAB | Facility: HOSPITAL | Age: 23
End: 2018-12-11
Attending: OBSTETRICS & GYNECOLOGY
Payer: MEDICAID

## 2018-12-11 ENCOUNTER — ROUTINE PRENATAL (OUTPATIENT)
Dept: OBSTETRICS AND GYNECOLOGY | Facility: CLINIC | Age: 23
End: 2018-12-11
Payer: MEDICAID

## 2018-12-11 VITALS — SYSTOLIC BLOOD PRESSURE: 114 MMHG | BODY MASS INDEX: 30.4 KG/M2 | WEIGHT: 166.25 LBS | DIASTOLIC BLOOD PRESSURE: 68 MMHG

## 2018-12-11 DIAGNOSIS — O09.292 HX OF PREECLAMPSIA, PRIOR PREGNANCY, CURRENTLY PREGNANT, SECOND TRIMESTER: ICD-10-CM

## 2018-12-11 DIAGNOSIS — Z3A.27 27 WEEKS GESTATION OF PREGNANCY: ICD-10-CM

## 2018-12-11 DIAGNOSIS — Z11.3 SCREENING EXAMINATION FOR STD (SEXUALLY TRANSMITTED DISEASE): Primary | ICD-10-CM

## 2018-12-11 DIAGNOSIS — Z98.891 HX OF CESAREAN SECTION: ICD-10-CM

## 2018-12-11 DIAGNOSIS — Z87.448 HX OF PYELONEPHRITIS: ICD-10-CM

## 2018-12-11 DIAGNOSIS — O23.02 PYELONEPHRITIS AFFECTING PREGNANCY IN SECOND TRIMESTER: ICD-10-CM

## 2018-12-11 DIAGNOSIS — A54.9 GONORRHEA: ICD-10-CM

## 2018-12-11 LAB — GLUCOSE SERPL-MCNC: 152 MG/DL

## 2018-12-11 PROCEDURE — 99999 PR PBB SHADOW E&M-EST. PATIENT-LVL II: CPT | Mod: PBBFAC,,, | Performed by: OBSTETRICS & GYNECOLOGY

## 2018-12-11 PROCEDURE — 87491 CHLMYD TRACH DNA AMP PROBE: CPT

## 2018-12-11 PROCEDURE — 82950 GLUCOSE TEST: CPT

## 2018-12-11 PROCEDURE — 99213 OFFICE O/P EST LOW 20 MIN: CPT | Mod: S$PBB,TH,, | Performed by: OBSTETRICS & GYNECOLOGY

## 2018-12-11 PROCEDURE — 36415 COLL VENOUS BLD VENIPUNCTURE: CPT

## 2018-12-11 PROCEDURE — 99212 OFFICE O/P EST SF 10 MIN: CPT | Mod: PBBFAC,TH,PO | Performed by: OBSTETRICS & GYNECOLOGY

## 2018-12-11 RX ORDER — NITROFURANTOIN (MACROCRYSTALS) 100 MG/1
100 CAPSULE ORAL NIGHTLY
Qty: 30 CAPSULE | Refills: 3 | Status: SHIPPED | OUTPATIENT
Start: 2018-12-11 | End: 2019-01-08

## 2018-12-11 NOTE — PROGRESS NOTES
Admitted with Pyelonephritis @ [facility]  Mid November     No complaints today   No nausea or vomiting   Denies vaginal bleeding or cramping     Prenatal labs reviewed  Aneuploidy screen  offered : Quad ordered today   General Pregnancy  recommendations given  PNV + H2O intake    Anatomy US a t 20 weeks  Done normal   Hx Pyelo = On macrobid q day  Hx Chlamydia= Gc/CT today  (partner did not get treated)     Flu  shot given     Ascencion Fajardo M.D.   OB/GYN

## 2018-12-12 ENCOUNTER — TELEPHONE (OUTPATIENT)
Dept: OBSTETRICS AND GYNECOLOGY | Facility: CLINIC | Age: 23
End: 2018-12-12

## 2018-12-12 DIAGNOSIS — Z3A.27 27 WEEKS GESTATION OF PREGNANCY: Primary | ICD-10-CM

## 2018-12-12 NOTE — TELEPHONE ENCOUNTER
Elevated Diabetes screen Needs 3h GTT   Order placed in epic   Please inform patient     Ascencion Fajardo M.D.   OB/GYN    12/12/2018

## 2018-12-14 ENCOUNTER — TELEPHONE (OUTPATIENT)
Dept: OBSTETRICS AND GYNECOLOGY | Facility: CLINIC | Age: 23
End: 2018-12-14

## 2018-12-14 LAB
C TRACH DNA SPEC QL NAA+PROBE: DETECTED
N GONORRHOEA DNA SPEC QL NAA+PROBE: NOT DETECTED

## 2018-12-14 RX ORDER — AZITHROMYCIN 500 MG/1
1000 TABLET, FILM COATED ORAL ONCE
Qty: 2 TABLET | Refills: 0 | Status: SHIPPED | OUTPATIENT
Start: 2018-12-14 | End: 2018-12-14

## 2018-12-14 NOTE — TELEPHONE ENCOUNTER
I spoke with Radha via the language line,  [id number], and notified her of the results and the treatment.  She understood and had no further questions.

## 2018-12-14 NOTE — TELEPHONE ENCOUNTER
Cervical cultures resulted and reviewed:     GC /CT :Gonorrhea is negative     Chlamydia is positive   Rx sent for Azithromycin 1 gr PO x1    Please note that this is a sexually transmitted disease.  Sexual partner should be tested and treated if positive to   Prevent reinfection  Patient should be tested again in 6-8 weeks.    Ascencion Fajardo M.D.   OB/GYN

## 2018-12-18 ENCOUNTER — TELEPHONE (OUTPATIENT)
Dept: OBSTETRICS AND GYNECOLOGY | Facility: CLINIC | Age: 23
End: 2018-12-18

## 2018-12-18 ENCOUNTER — LAB VISIT (OUTPATIENT)
Dept: LAB | Facility: HOSPITAL | Age: 23
End: 2018-12-18
Attending: OBSTETRICS & GYNECOLOGY
Payer: MEDICAID

## 2018-12-18 DIAGNOSIS — Z3A.28 28 WEEKS GESTATION OF PREGNANCY: Primary | ICD-10-CM

## 2018-12-18 DIAGNOSIS — Z3A.27 27 WEEKS GESTATION OF PREGNANCY: ICD-10-CM

## 2018-12-18 PROCEDURE — 82951 GLUCOSE TOLERANCE TEST (GTT): CPT

## 2018-12-18 PROCEDURE — 36415 COLL VENOUS BLD VENIPUNCTURE: CPT

## 2018-12-19 NOTE — TELEPHONE ENCOUNTER
----- Message from Lee Ann Guerrero sent at 12/18/2018  9:59 AM CST -----  Contact: Sharee from [facility]/[phone]  Lab called to state patient did not get her 3 hour Glucose lab done, she left and did not return.

## 2018-12-19 NOTE — TELEPHONE ENCOUNTER
This patient did not complete the 3hr glucose test    Please call her that she has to return     Ascencion Fajardo M.D.   OB/GYN    12/18/2018

## 2018-12-20 NOTE — TELEPHONE ENCOUNTER
Called patient to schedule alicia; she will come tomorrow 12/21/18  @7:30am. Patient agreed and verbalized understanding.

## 2018-12-21 ENCOUNTER — LAB VISIT (OUTPATIENT)
Dept: LAB | Facility: HOSPITAL | Age: 23
End: 2018-12-21
Attending: OBSTETRICS & GYNECOLOGY
Payer: MEDICAID

## 2018-12-21 DIAGNOSIS — Z3A.28 28 WEEKS GESTATION OF PREGNANCY: ICD-10-CM

## 2018-12-21 LAB
GLUCOSE SERPL-MCNC: 116 MG/DL
GLUCOSE SERPL-MCNC: 154 MG/DL
GLUCOSE SERPL-MCNC: 187 MG/DL
GLUCOSE SERPL-MCNC: 80 MG/DL

## 2018-12-21 PROCEDURE — 82952 GTT-ADDED SAMPLES: CPT

## 2018-12-21 PROCEDURE — 82951 GLUCOSE TOLERANCE TEST (GTT): CPT

## 2018-12-21 PROCEDURE — 36415 COLL VENOUS BLD VENIPUNCTURE: CPT

## 2019-01-07 ENCOUNTER — TELEPHONE (OUTPATIENT)
Dept: OBSTETRICS AND GYNECOLOGY | Facility: HOSPITAL | Age: 24
End: 2019-01-07

## 2019-01-07 NOTE — TELEPHONE ENCOUNTER
3 hr GTT   Glucose intolerance.   Diet recommendations   Watch carbohydrates intake , focus on fruits and vegetables, decrease sweets.    Fu as scheduled    Ascencion Fajardo M.D.   OB/GYN    1/7/2019

## 2019-01-08 ENCOUNTER — ROUTINE PRENATAL (OUTPATIENT)
Dept: OBSTETRICS AND GYNECOLOGY | Facility: CLINIC | Age: 24
End: 2019-01-08
Payer: MEDICAID

## 2019-01-08 VITALS
DIASTOLIC BLOOD PRESSURE: 80 MMHG | WEIGHT: 169.75 LBS | BODY MASS INDEX: 31.05 KG/M2 | SYSTOLIC BLOOD PRESSURE: 120 MMHG

## 2019-01-08 DIAGNOSIS — O23.02 PYELONEPHRITIS AFFECTING PREGNANCY IN SECOND TRIMESTER: ICD-10-CM

## 2019-01-08 DIAGNOSIS — Z34.83 ENCOUNTER FOR SUPERVISION OF OTHER NORMAL PREGNANCY IN THIRD TRIMESTER: ICD-10-CM

## 2019-01-08 DIAGNOSIS — Z3A.31 31 WEEKS GESTATION OF PREGNANCY: Primary | ICD-10-CM

## 2019-01-08 DIAGNOSIS — O09.292 HX OF PREECLAMPSIA, PRIOR PREGNANCY, CURRENTLY PREGNANT, SECOND TRIMESTER: ICD-10-CM

## 2019-01-08 LAB
GLUCOSE SERPL-MCNC: 103 MG/DL
GLUCOSE SERPL-MCNC: NORMAL MG/DL

## 2019-01-08 PROCEDURE — 99213 OFFICE O/P EST LOW 20 MIN: CPT | Mod: S$PBB,TH,, | Performed by: OBSTETRICS & GYNECOLOGY

## 2019-01-08 PROCEDURE — 99212 OFFICE O/P EST SF 10 MIN: CPT | Mod: PBBFAC,TH,PO | Performed by: OBSTETRICS & GYNECOLOGY

## 2019-01-08 PROCEDURE — 99999 PR PBB SHADOW E&M-EST. PATIENT-LVL II: ICD-10-PCS | Mod: PBBFAC,,, | Performed by: OBSTETRICS & GYNECOLOGY

## 2019-01-08 PROCEDURE — 99213 PR OFFICE/OUTPT VISIT, EST, LEVL III, 20-29 MIN: ICD-10-PCS | Mod: S$PBB,TH,, | Performed by: OBSTETRICS & GYNECOLOGY

## 2019-01-08 PROCEDURE — 99999 PR PBB SHADOW E&M-EST. PATIENT-LVL II: CPT | Mod: PBBFAC,,, | Performed by: OBSTETRICS & GYNECOLOGY

## 2019-01-08 RX ORDER — ASPIRIN 81 MG/1
81 TABLET ORAL DAILY
Qty: 150 TABLET | Refills: 2 | Status: ON HOLD | OUTPATIENT
Start: 2019-01-08 | End: 2019-03-02 | Stop reason: HOSPADM

## 2019-01-08 RX ORDER — NITROFURANTOIN (MACROCRYSTALS) 100 MG/1
100 CAPSULE ORAL NIGHTLY
Qty: 30 CAPSULE | Refills: 1 | Status: SHIPPED | OUTPATIENT
Start: 2019-01-08 | End: 2019-02-18

## 2019-01-08 NOTE — PROGRESS NOTES
Admitted with Pyelonephritis @ [facility]  Mid November     No complaints today   No nausea or vomiting   Denies vaginal bleeding or cramping     Prenatal labs reviewed  Aneuploidy screen  offered : Quad ordered today   General Pregnancy  recommendations given  PNV + H2O intake    Anatomy US a t 20 weeks  Done normal   Hx Pyelo = On macrobid q day  Hx Chlamydia= Gc/CT today  (partner did not get treated)     Flu  shot given   Not taking ASA . Advised to take it . New RX  Sent   Maximiliano taking Macrodantin ,  New Rx sent       Ascencion Fajardo M.D.   OB/GYN

## 2019-01-16 ENCOUNTER — HOSPITAL ENCOUNTER (OUTPATIENT)
Facility: HOSPITAL | Age: 24
Discharge: HOME | End: 2019-01-16
Attending: OBSTETRICS & GYNECOLOGY | Admitting: OBSTETRICS & GYNECOLOGY
Payer: MEDICAID

## 2019-01-16 ENCOUNTER — TELEPHONE (OUTPATIENT)
Dept: OBSTETRICS AND GYNECOLOGY | Facility: CLINIC | Age: 24
End: 2019-01-16

## 2019-01-16 VITALS — DIASTOLIC BLOOD PRESSURE: 72 MMHG | SYSTOLIC BLOOD PRESSURE: 110 MMHG | HEART RATE: 98 BPM

## 2019-01-16 LAB
AMORPH CRY URNS QL MICRO: ABNORMAL
BACTERIA #/AREA URNS HPF: ABNORMAL /HPF
BILIRUB UR QL STRIP: NEGATIVE
CLARITY UR: CLEAR
COLOR UR: YELLOW
GLUCOSE UR QL STRIP: NEGATIVE
HGB UR QL STRIP: ABNORMAL
KETONES UR QL STRIP: NEGATIVE
LEUKOCYTE ESTERASE UR QL STRIP: ABNORMAL
MICROSCOPIC COMMENT: ABNORMAL
NITRITE UR QL STRIP: NEGATIVE
PH UR STRIP: 7 [PH] (ref 5–8)
PROT UR QL STRIP: NEGATIVE
RBC #/AREA URNS HPF: 3 /HPF (ref 0–4)
SP GR UR STRIP: 1.01 (ref 1–1.03)
SQUAMOUS #/AREA URNS HPF: 4 /HPF
URN SPEC COLLECT METH UR: ABNORMAL
UROBILINOGEN UR STRIP-ACNC: NEGATIVE EU/DL
WBC #/AREA URNS HPF: 3 /HPF (ref 0–5)

## 2019-01-16 PROCEDURE — 81000 URINALYSIS NONAUTO W/SCOPE: CPT

## 2019-01-16 NOTE — NURSING
Pt presented with c/o lower abd cramping,pt denies, lof or vag. Bleeding,pt does state she had sex yesterday evening

## 2019-01-16 NOTE — DISCHARGE INSTRUCTIONS
Understanding Preeclampsia  Preeclampsia is pregnancy-related high blood pressure that develops after 20 weeks' gestation. It can lead to health risks for you and your baby. No one knows what causes preeclampsia. But it is known that the only cure is delivery.    Signs and symptoms  A common sign of preeclampsia is high blood pressure. Other signs and symptoms may include:  · Rapid weight gain (more than 5 pounds in a week)  · Feeling nauseous; throwing up  · Protein in your urine  · Headache  · Stomach pain   · Vision problems (seeing flashes or spots)  · Edema (swelling) in your face or hands (this also commonly happens near the end of normal pregnancies, even without preeclampsia)    When to call your healthcare provider  Call your healthcare provider if swelling, weight gain, or other symptoms come on quickly or are severe. Some cases of preeclampsia are more severe than others. Your signs and symptoms also may change or worsen as you get closer to your due date.    Dangers of preeclampsia  If not treated, preeclampsia can cause problems for you and your baby. The placenta (organ that nourishes your baby) may tear away from the uterine wall. This can lead to fetal distress (the baby is at risk for health problems) and premature delivery. Preeclampsia can also cause these health problems:  · Kidney failure or other organ damage  · Seizures  · Stroke  · Death  · Premature delivery              Home Undelivered Discharge Instructions    After Discharge Orders: Start taking Macrobid 100mg by mouth twice a day for 7 days, follow up with Dr. Fajardo    Future Appointments   Date Time Provider Department Center   1/22/2019  9:30 AM Ascencion Fajardo MD [facility]       Call physician or midwife's office  for instructions.    Current Discharge Medication List      CONTINUE these medications which have NOT CHANGED    Details   !! aspirin (ECOTRIN) 81 MG EC tablet Take 1 tablet (81 mg total) by mouth once  daily.  Qty: 150 tablet, Refills: 2      !! aspirin (ECOTRIN) 81 MG EC tablet Take 1 tablet (81 mg total) by mouth once daily.  Qty: 150 tablet, Refills: 2      nitrofurantoin (MACRODANTIN) 100 MG capsule Take 1 capsule (100 mg total) by mouth nightly.  Qty: 30 capsule, Refills: 1      PRENATAL 19 29 mg iron- 1 mg Chew CHEW 1 T PO  ONCE DAILY  Refills: 3       !! - Potential duplicate medications found. Please discuss with provider.                  · Diet:  normal diet as tolerated    · Rest: normal activity as tolerated    Other instructions: Do kick counts once a day on your baby. Choose the time of day your baby is most active. Get in a comfortable lying or sitting position and time how long it takes to feel 10 kicks, twists, turns, swishes, or rolls. Call your physician or midwife if there have not been 10 kicks in 1 hours    Call physician or midwife, return to Labor and Delivery, call 911, or go to the nearest Emergency Room if: increased leakage or fluid, contractions more than  10 per  1 hour, decreased fetal movement, persistent low back pain or cramping, bleeding from vaginal area, difficulty urinating, pain with urination, difficulty breathing, new calf pain, persistent headache or vision change

## 2019-01-16 NOTE — TELEPHONE ENCOUNTER
----- Message from Sunita Carreon sent at 1/16/2019  8:42 AM CST -----  Contact: [facility]- [phone]  Pharmacy called to verify prescription for patient, aspirin (ECOTRIN). Patient is pregnant and not sure if she should continue taking the medication.    Please call.

## 2019-01-22 ENCOUNTER — ROUTINE PRENATAL (OUTPATIENT)
Dept: OBSTETRICS AND GYNECOLOGY | Facility: CLINIC | Age: 24
End: 2019-01-22
Payer: MEDICAID

## 2019-01-22 VITALS — WEIGHT: 168 LBS | SYSTOLIC BLOOD PRESSURE: 118 MMHG | BODY MASS INDEX: 30.73 KG/M2 | DIASTOLIC BLOOD PRESSURE: 80 MMHG

## 2019-01-22 DIAGNOSIS — O23.02 PYELONEPHRITIS AFFECTING PREGNANCY IN SECOND TRIMESTER: ICD-10-CM

## 2019-01-22 DIAGNOSIS — O09.292 HX OF PREECLAMPSIA, PRIOR PREGNANCY, CURRENTLY PREGNANT, SECOND TRIMESTER: ICD-10-CM

## 2019-01-22 DIAGNOSIS — Z34.83 ENCOUNTER FOR SUPERVISION OF OTHER NORMAL PREGNANCY IN THIRD TRIMESTER: ICD-10-CM

## 2019-01-22 DIAGNOSIS — Z3A.33 33 WEEKS GESTATION OF PREGNANCY: Primary | ICD-10-CM

## 2019-01-22 PROCEDURE — 99999 PR PBB SHADOW E&M-EST. PATIENT-LVL II: ICD-10-PCS | Mod: PBBFAC,,, | Performed by: OBSTETRICS & GYNECOLOGY

## 2019-01-22 PROCEDURE — 99212 OFFICE O/P EST SF 10 MIN: CPT | Mod: PBBFAC,TH,PO | Performed by: OBSTETRICS & GYNECOLOGY

## 2019-01-22 PROCEDURE — 99999 PR PBB SHADOW E&M-EST. PATIENT-LVL II: CPT | Mod: PBBFAC,,, | Performed by: OBSTETRICS & GYNECOLOGY

## 2019-01-22 PROCEDURE — 99213 PR OFFICE/OUTPT VISIT, EST, LEVL III, 20-29 MIN: ICD-10-PCS | Mod: S$PBB,TH,, | Performed by: OBSTETRICS & GYNECOLOGY

## 2019-01-22 PROCEDURE — 99213 OFFICE O/P EST LOW 20 MIN: CPT | Mod: S$PBB,TH,, | Performed by: OBSTETRICS & GYNECOLOGY

## 2019-01-22 NOTE — PROGRESS NOTES
Admitted with Pyelonephritis @ [facility]  Mid November     No complaints today   No nausea or vomiting   Denies vaginal bleeding or cramping     Prenatal labs reviewed  Aneuploidy screen  offered : Quad ordered today   General Pregnancy  recommendations given  PNV + H2O intake    Anatomy US a t 20 weeks  Done normal   Hx Pyelo = On macrobid q day  Hx Chlamydia= Gc/CT today  (partner did not get treated)     Flu  shot given   Not taking ASA . Advised to take it . New RX  Sent   Now taking macrobid  q day      Ascencion Fajardo M.D.   OB/GYN

## 2019-01-27 ENCOUNTER — HOSPITAL ENCOUNTER (OUTPATIENT)
Facility: HOSPITAL | Age: 24
Discharge: HOME | End: 2019-01-27
Attending: OBSTETRICS & GYNECOLOGY | Admitting: OBSTETRICS & GYNECOLOGY
Payer: MEDICAID

## 2019-01-27 VITALS
DIASTOLIC BLOOD PRESSURE: 71 MMHG | WEIGHT: 176 LBS | BODY MASS INDEX: 32.39 KG/M2 | HEIGHT: 62 IN | OXYGEN SATURATION: 98 % | SYSTOLIC BLOOD PRESSURE: 115 MMHG | HEART RATE: 88 BPM | TEMPERATURE: 99 F

## 2019-01-27 DIAGNOSIS — Z36.89 ENCOUNTER FOR TRIAGE IN PREGNANT PATIENT: ICD-10-CM

## 2019-01-27 PROCEDURE — 99211 OFF/OP EST MAY X REQ PHY/QHP: CPT

## 2019-01-28 NOTE — NURSING
2245-Arrived to unit via wheelchair, brought to triage room and instructed to change in to gown.   2255-Patient presents with complaints of contractions, connected to monitors, nursing assessment initiated, SVE by RN cervix is soft and closed.   2340-Call to Dr. Fajardo, orders to discharge patient, follow-up in office.   2350-Patient given discharge instructions, all questions answered.   2352-Patient ambulated off unit without event.

## 2019-02-05 ENCOUNTER — ROUTINE PRENATAL (OUTPATIENT)
Dept: OBSTETRICS AND GYNECOLOGY | Facility: CLINIC | Age: 24
End: 2019-02-05
Payer: MEDICAID

## 2019-02-05 VITALS
SYSTOLIC BLOOD PRESSURE: 122 MMHG | BODY MASS INDEX: 32.02 KG/M2 | WEIGHT: 175.06 LBS | DIASTOLIC BLOOD PRESSURE: 90 MMHG

## 2019-02-05 DIAGNOSIS — Z3A.35 35 WEEKS GESTATION OF PREGNANCY: ICD-10-CM

## 2019-02-05 DIAGNOSIS — Z34.93 ENCOUNTER FOR SUPERVISION OF NORMAL PREGNANCY IN THIRD TRIMESTER, UNSPECIFIED GRAVIDITY: Primary | ICD-10-CM

## 2019-02-05 PROCEDURE — 99999 PR PBB SHADOW E&M-EST. PATIENT-LVL II: ICD-10-PCS | Mod: PBBFAC,,, | Performed by: OBSTETRICS & GYNECOLOGY

## 2019-02-05 PROCEDURE — 99213 OFFICE O/P EST LOW 20 MIN: CPT | Mod: S$PBB,TH,, | Performed by: OBSTETRICS & GYNECOLOGY

## 2019-02-05 PROCEDURE — 99999 PR PBB SHADOW E&M-EST. PATIENT-LVL II: CPT | Mod: PBBFAC,,, | Performed by: OBSTETRICS & GYNECOLOGY

## 2019-02-05 PROCEDURE — 99213 PR OFFICE/OUTPT VISIT, EST, LEVL III, 20-29 MIN: ICD-10-PCS | Mod: S$PBB,TH,, | Performed by: OBSTETRICS & GYNECOLOGY

## 2019-02-05 PROCEDURE — 99212 OFFICE O/P EST SF 10 MIN: CPT | Mod: PBBFAC,PO | Performed by: OBSTETRICS & GYNECOLOGY

## 2019-02-05 PROCEDURE — 87081 CULTURE SCREEN ONLY: CPT

## 2019-02-05 PROCEDURE — 87147 CULTURE TYPE IMMUNOLOGIC: CPT

## 2019-02-05 NOTE — PROGRESS NOTES
Patient is complaining of having contractions, lower back, vaginal pain and pressure for about 1 week. Patient is complaining of lots of headaches, dizziness, swelling of hands and feet. Denies blurry vision.

## 2019-02-09 LAB — BACTERIA SPEC AEROBE CULT: NORMAL

## 2019-02-11 ENCOUNTER — TELEPHONE (OUTPATIENT)
Dept: OBSTETRICS AND GYNECOLOGY | Facility: CLINIC | Age: 24
End: 2019-02-11

## 2019-02-11 NOTE — TELEPHONE ENCOUNTER
----- Message from Ascencion Fajardo MD sent at 2/11/2019 12:13 AM CST -----  GBBS culture is negative  Please inform patient    RTC as scheduled     Ascencion Fajardo M.D.   OB/GYN

## 2019-02-11 NOTE — PROGRESS NOTES
Admitted with Pyelonephritis @ [facility]  Mid November     No complaints today   No nausea or vomiting   Denies vaginal bleeding or cramping     Prenatal labs reviewed  Aneuploidy screen  offered : Quad ordered today   General Pregnancy  recommendations given  PNV + H2O intake    Anatomy US a t 20 weeks  Done normal   Hx Pyelo = On macrobid q day  Hx Chlamydia= Gc/CT today  (partner did not get treated)     Flu  shot given   Not taking ASA . Advised to take it . New RX  Sent   Now taking macrobid  q day  Plan to induce  2/28/19    Ascencion Fajardo M.D.   OB/GYN

## 2019-02-13 DIAGNOSIS — Z3A.36 36 WEEKS GESTATION OF PREGNANCY: Primary | ICD-10-CM

## 2019-02-15 ENCOUNTER — PROCEDURE VISIT (OUTPATIENT)
Dept: OBSTETRICS AND GYNECOLOGY | Facility: CLINIC | Age: 24
End: 2019-02-15
Payer: MEDICAID

## 2019-02-15 DIAGNOSIS — Z3A.36 36 WEEKS GESTATION OF PREGNANCY: ICD-10-CM

## 2019-02-15 DIAGNOSIS — Z34.83 ENCOUNTER FOR SUPERVISION OF OTHER NORMAL PREGNANCY IN THIRD TRIMESTER: Primary | ICD-10-CM

## 2019-02-15 PROCEDURE — 76816 PR  US,PREGNANT UTERUS,F/U,TRANSABD APP: ICD-10-PCS | Mod: 26,S$PBB,, | Performed by: OBSTETRICS & GYNECOLOGY

## 2019-02-15 PROCEDURE — 76816 OB US FOLLOW-UP PER FETUS: CPT | Mod: PBBFAC,PO | Performed by: OBSTETRICS & GYNECOLOGY

## 2019-02-15 PROCEDURE — 76816 OB US FOLLOW-UP PER FETUS: CPT | Mod: 26,S$PBB,, | Performed by: OBSTETRICS & GYNECOLOGY

## 2019-02-18 ENCOUNTER — ROUTINE PRENATAL (OUTPATIENT)
Dept: OBSTETRICS AND GYNECOLOGY | Facility: CLINIC | Age: 24
End: 2019-02-18
Payer: MEDICAID

## 2019-02-18 VITALS
WEIGHT: 180.13 LBS | DIASTOLIC BLOOD PRESSURE: 82 MMHG | BODY MASS INDEX: 32.94 KG/M2 | SYSTOLIC BLOOD PRESSURE: 118 MMHG

## 2019-02-18 DIAGNOSIS — Z98.891 PREVIOUS CESAREAN SECTION: ICD-10-CM

## 2019-02-18 DIAGNOSIS — Z3A.37 37 WEEKS GESTATION OF PREGNANCY: ICD-10-CM

## 2019-02-18 DIAGNOSIS — Z11.3 SCREENING EXAMINATION FOR SEXUALLY TRANSMITTED DISEASE: Primary | ICD-10-CM

## 2019-02-18 PROCEDURE — 99999 PR PBB SHADOW E&M-EST. PATIENT-LVL II: ICD-10-PCS | Mod: PBBFAC,,, | Performed by: OBSTETRICS & GYNECOLOGY

## 2019-02-18 PROCEDURE — 87491 CHLMYD TRACH DNA AMP PROBE: CPT

## 2019-02-18 PROCEDURE — 99213 OFFICE O/P EST LOW 20 MIN: CPT | Mod: S$PBB,,, | Performed by: OBSTETRICS & GYNECOLOGY

## 2019-02-18 PROCEDURE — 99213 PR OFFICE/OUTPT VISIT, EST, LEVL III, 20-29 MIN: ICD-10-PCS | Mod: S$PBB,,, | Performed by: OBSTETRICS & GYNECOLOGY

## 2019-02-18 PROCEDURE — 99212 OFFICE O/P EST SF 10 MIN: CPT | Mod: PBBFAC,PO | Performed by: OBSTETRICS & GYNECOLOGY

## 2019-02-18 PROCEDURE — 99999 PR PBB SHADOW E&M-EST. PATIENT-LVL II: CPT | Mod: PBBFAC,,, | Performed by: OBSTETRICS & GYNECOLOGY

## 2019-02-18 RX ORDER — SODIUM CHLORIDE, SODIUM LACTATE, POTASSIUM CHLORIDE, CALCIUM CHLORIDE 600; 310; 30; 20 MG/100ML; MG/100ML; MG/100ML; MG/100ML
INJECTION, SOLUTION INTRAVENOUS CONTINUOUS
Status: CANCELLED | OUTPATIENT
Start: 2019-02-18

## 2019-02-18 RX ORDER — MISOPROSTOL 100 UG/1
800 TABLET ORAL
Status: CANCELLED | OUTPATIENT
Start: 2019-02-18

## 2019-02-18 RX ORDER — MUPIROCIN 20 MG/G
OINTMENT TOPICAL
Status: CANCELLED | OUTPATIENT
Start: 2019-02-18

## 2019-02-18 RX ORDER — SODIUM CITRATE AND CITRIC ACID MONOHYDRATE 334; 500 MG/5ML; MG/5ML
30 SOLUTION ORAL
Status: CANCELLED | OUTPATIENT
Start: 2019-02-18

## 2019-02-18 NOTE — PROGRESS NOTES
Admitted with Pyelonephritis @ [facility]  Mid November     No complaints today   No nausea or vomiting   Denies vaginal bleeding or cramping     Prenatal labs reviewed  Aneuploidy screen  offered : Quad ordered today   General Pregnancy  recommendations given  PNV + H2O intake    Anatomy US a t 20 weeks  Done normal   Hx Pyelo = On macrobid q day  Hx Chlamydia= Gc/CT today  (partner did not get treated)     Flu  shot given   GC/CT today       Repeat C/S on 2/28 800am     Ascencion Fajardo M.D.   OB/GYN

## 2019-02-18 NOTE — PROGRESS NOTES
Patient complaining of pelvic pressure, headaches, blurry vision and vomiting that started about 1 week ago.   U/A- 2+ Blood

## 2019-02-19 LAB
C TRACH DNA SPEC QL NAA+PROBE: DETECTED
N GONORRHOEA DNA SPEC QL NAA+PROBE: NOT DETECTED

## 2019-02-21 ENCOUNTER — TELEPHONE (OUTPATIENT)
Dept: OBSTETRICS AND GYNECOLOGY | Facility: HOSPITAL | Age: 24
End: 2019-02-21

## 2019-02-21 RX ORDER — AZITHROMYCIN 500 MG/1
1000 TABLET, FILM COATED ORAL ONCE
Qty: 2 TABLET | Refills: 0 | Status: SHIPPED | OUTPATIENT
Start: 2019-02-21 | End: 2019-02-21

## 2019-02-22 DIAGNOSIS — Z97.5 IUD CONTRACEPTION: Primary | ICD-10-CM

## 2019-02-22 NOTE — TELEPHONE ENCOUNTER
GC/CT is positive again   Cervical cultures resulted and reviewed:     GC /CT :Gonorrhea is negative     Chlamydia is positive   Rx sent for Azithromycin 1 gr PO x1    Please note that this is a sexually transmitted disease.  Sexual partner should be tested and treated if positive to   Prevent reinfection  Patient should be tested again in 6-8 weeks.    Ascencion Fajardo M.D.   OB/GYN

## 2019-02-28 ENCOUNTER — HOSPITAL ENCOUNTER (INPATIENT)
Facility: HOSPITAL | Age: 24
LOS: 2 days | Discharge: HOME | End: 2019-03-02
Attending: OBSTETRICS & GYNECOLOGY | Admitting: OBSTETRICS & GYNECOLOGY
Payer: MEDICAID

## 2019-02-28 ENCOUNTER — ANESTHESIA (OUTPATIENT)
Dept: OBSTETRICS AND GYNECOLOGY | Facility: HOSPITAL | Age: 24
End: 2019-02-28
Payer: MEDICAID

## 2019-02-28 ENCOUNTER — ANESTHESIA EVENT (OUTPATIENT)
Dept: OBSTETRICS AND GYNECOLOGY | Facility: HOSPITAL | Age: 24
End: 2019-02-28
Payer: MEDICAID

## 2019-02-28 DIAGNOSIS — Z3A.37 37 WEEKS GESTATION OF PREGNANCY: ICD-10-CM

## 2019-02-28 DIAGNOSIS — Z3A.39 39 WEEKS GESTATION OF PREGNANCY: Primary | ICD-10-CM

## 2019-02-28 DIAGNOSIS — Z11.3 SCREENING EXAMINATION FOR SEXUALLY TRANSMITTED DISEASE: ICD-10-CM

## 2019-02-28 DIAGNOSIS — Z98.891 PREVIOUS CESAREAN SECTION: ICD-10-CM

## 2019-02-28 PROBLEM — Z87.448 HX OF ACUTE PYELONEPHRITIS: Status: ACTIVE | Noted: 2019-02-28

## 2019-02-28 PROBLEM — Z36.89 ENCOUNTER FOR TRIAGE IN PREGNANT PATIENT: Status: RESOLVED | Noted: 2019-01-27 | Resolved: 2019-02-28

## 2019-02-28 PROBLEM — O09.899 POSITIVE FETAL FIBRONECTIN AT 22 WEEKS TO 34 WEEKS GESTATION: Status: RESOLVED | Noted: 2018-11-18 | Resolved: 2019-02-28

## 2019-02-28 PROBLEM — R73.9 HYPERGLYCEMIA: Status: RESOLVED | Noted: 2018-11-18 | Resolved: 2019-02-28

## 2019-02-28 PROBLEM — O23.02 PYELONEPHRITIS AFFECTING PREGNANCY IN SECOND TRIMESTER: Status: RESOLVED | Noted: 2018-11-18 | Resolved: 2019-02-28

## 2019-02-28 PROBLEM — O09.299 HX OF PREECLAMPSIA, PRIOR PREGNANCY, CURRENTLY PREGNANT: Status: ACTIVE | Noted: 2019-02-28

## 2019-02-28 PROBLEM — R87.89 POSITIVE FETAL FIBRONECTIN AT 22 WEEKS TO 34 WEEKS GESTATION: Status: RESOLVED | Noted: 2018-11-18 | Resolved: 2019-02-28

## 2019-02-28 LAB
ABO + RH BLD: NORMAL
BASOPHILS # BLD AUTO: 0.02 K/UL
BASOPHILS NFR BLD: 0.2 %
BLD GP AB SCN CELLS X3 SERPL QL: NORMAL
DIFFERENTIAL METHOD: ABNORMAL
EOSINOPHIL # BLD AUTO: 0 K/UL
EOSINOPHIL NFR BLD: 0.3 %
ERYTHROCYTE [DISTWIDTH] IN BLOOD BY AUTOMATED COUNT: 16.7 %
HCT VFR BLD AUTO: 37 %
HGB BLD-MCNC: 11.7 G/DL
LYMPHOCYTES # BLD AUTO: 4.4 K/UL
LYMPHOCYTES NFR BLD: 37.6 %
MCH RBC QN AUTO: 27.5 PG
MCHC RBC AUTO-ENTMCNC: 31.6 G/DL
MCV RBC AUTO: 87 FL
MONOCYTES # BLD AUTO: 0.6 K/UL
MONOCYTES NFR BLD: 5.2 %
NEUTROPHILS # BLD AUTO: 6.6 K/UL
NEUTROPHILS NFR BLD: 56.4 %
PLATELET # BLD AUTO: 354 K/UL
PMV BLD AUTO: 10.4 FL
RBC # BLD AUTO: 4.26 M/UL
WBC # BLD AUTO: 11.74 K/UL

## 2019-02-28 PROCEDURE — 51702 INSERT TEMP BLADDER CATH: CPT

## 2019-02-28 PROCEDURE — 36415 COLL VENOUS BLD VENIPUNCTURE: CPT

## 2019-02-28 PROCEDURE — 25000003 PHARM REV CODE 250: Performed by: ANESTHESIOLOGY

## 2019-02-28 PROCEDURE — 11000001 HC ACUTE MED/SURG PRIVATE ROOM

## 2019-02-28 PROCEDURE — 25000003 PHARM REV CODE 250: Performed by: OBSTETRICS & GYNECOLOGY

## 2019-02-28 PROCEDURE — 37000008 HC ANESTHESIA 1ST 15 MINUTES: Performed by: OBSTETRICS & GYNECOLOGY

## 2019-02-28 PROCEDURE — 85025 COMPLETE CBC W/AUTO DIFF WBC: CPT

## 2019-02-28 PROCEDURE — 63600175 PHARM REV CODE 636 W HCPCS: Performed by: ANESTHESIOLOGY

## 2019-02-28 PROCEDURE — 59514 CESAREAN DELIVERY ONLY: CPT | Mod: GB,,, | Performed by: OBSTETRICS & GYNECOLOGY

## 2019-02-28 PROCEDURE — 86592 SYPHILIS TEST NON-TREP QUAL: CPT

## 2019-02-28 PROCEDURE — 62322 NJX INTERLAMINAR LMBR/SAC: CPT | Performed by: ANESTHESIOLOGY

## 2019-02-28 PROCEDURE — 36000685 HC CESAREAN SECTION LEVEL I

## 2019-02-28 PROCEDURE — S0028 INJECTION, FAMOTIDINE, 20 MG: HCPCS | Performed by: ANESTHESIOLOGY

## 2019-02-28 PROCEDURE — 86850 RBC ANTIBODY SCREEN: CPT

## 2019-02-28 PROCEDURE — 63600175 PHARM REV CODE 636 W HCPCS: Performed by: OBSTETRICS & GYNECOLOGY

## 2019-02-28 PROCEDURE — 59514 PR CESAREAN DELIVERY ONLY: ICD-10-PCS | Mod: GB,,, | Performed by: OBSTETRICS & GYNECOLOGY

## 2019-02-28 PROCEDURE — 27201121 HC TRAY,SPINAL-HYPERBARIC: Performed by: ANESTHESIOLOGY

## 2019-02-28 PROCEDURE — 37000009 HC ANESTHESIA EA ADD 15 MINS: Performed by: OBSTETRICS & GYNECOLOGY

## 2019-02-28 RX ORDER — ONDANSETRON 8 MG/1
8 TABLET, ORALLY DISINTEGRATING ORAL EVERY 8 HOURS PRN
Status: DISCONTINUED | OUTPATIENT
Start: 2019-02-28 | End: 2019-03-02 | Stop reason: HOSPADM

## 2019-02-28 RX ORDER — SIMETHICONE 80 MG
1 TABLET,CHEWABLE ORAL EVERY 6 HOURS PRN
Status: DISCONTINUED | OUTPATIENT
Start: 2019-02-28 | End: 2019-03-02 | Stop reason: HOSPADM

## 2019-02-28 RX ORDER — DIPHENHYDRAMINE HYDROCHLORIDE 50 MG/ML
25 INJECTION INTRAMUSCULAR; INTRAVENOUS EVERY 4 HOURS PRN
Status: DISCONTINUED | OUTPATIENT
Start: 2019-02-28 | End: 2019-03-02 | Stop reason: HOSPADM

## 2019-02-28 RX ORDER — BUPIVACAINE HYDROCHLORIDE 2.5 MG/ML
20 INJECTION, SOLUTION EPIDURAL; INFILTRATION; INTRACAUDAL ONCE
Status: DISCONTINUED | OUTPATIENT
Start: 2019-02-28 | End: 2019-02-28

## 2019-02-28 RX ORDER — MUPIROCIN 20 MG/G
OINTMENT TOPICAL
Status: DISCONTINUED | OUTPATIENT
Start: 2019-02-28 | End: 2019-02-28

## 2019-02-28 RX ORDER — OXYCODONE AND ACETAMINOPHEN 10; 325 MG/1; MG/1
1 TABLET ORAL EVERY 4 HOURS PRN
Status: DISCONTINUED | OUTPATIENT
Start: 2019-02-28 | End: 2019-03-02 | Stop reason: HOSPADM

## 2019-02-28 RX ORDER — MISOPROSTOL 200 UG/1
800 TABLET ORAL
Status: DISCONTINUED | OUTPATIENT
Start: 2019-02-28 | End: 2019-02-28

## 2019-02-28 RX ORDER — FAMOTIDINE 10 MG/ML
20 INJECTION INTRAVENOUS ONCE
Status: COMPLETED | OUTPATIENT
Start: 2019-02-28 | End: 2019-02-28

## 2019-02-28 RX ORDER — HYDROCORTISONE 25 MG/G
CREAM TOPICAL 3 TIMES DAILY PRN
Status: DISCONTINUED | OUTPATIENT
Start: 2019-02-28 | End: 2019-03-02 | Stop reason: HOSPADM

## 2019-02-28 RX ORDER — HYDROMORPHONE HYDROCHLORIDE 1 MG/ML
1 INJECTION, SOLUTION INTRAMUSCULAR; INTRAVENOUS; SUBCUTANEOUS EVERY 4 HOURS PRN
Status: DISCONTINUED | OUTPATIENT
Start: 2019-02-28 | End: 2019-03-02 | Stop reason: HOSPADM

## 2019-02-28 RX ORDER — OXYTOCIN 10 [USP'U]/ML
INJECTION, SOLUTION INTRAMUSCULAR; INTRAVENOUS
Status: DISCONTINUED | OUTPATIENT
Start: 2019-02-28 | End: 2019-02-28

## 2019-02-28 RX ORDER — SODIUM CHLORIDE, SODIUM LACTATE, POTASSIUM CHLORIDE, CALCIUM CHLORIDE 600; 310; 30; 20 MG/100ML; MG/100ML; MG/100ML; MG/100ML
INJECTION, SOLUTION INTRAVENOUS CONTINUOUS
Status: DISCONTINUED | OUTPATIENT
Start: 2019-02-28 | End: 2019-02-28

## 2019-02-28 RX ORDER — DIPHENHYDRAMINE HCL 25 MG
25 CAPSULE ORAL EVERY 4 HOURS PRN
Status: DISCONTINUED | OUTPATIENT
Start: 2019-02-28 | End: 2019-03-02 | Stop reason: HOSPADM

## 2019-02-28 RX ORDER — METOCLOPRAMIDE 10 MG/1
10 TABLET ORAL EVERY 6 HOURS PRN
Status: DISCONTINUED | OUTPATIENT
Start: 2019-02-28 | End: 2019-02-28

## 2019-02-28 RX ORDER — FENTANYL CITRATE 50 UG/ML
INJECTION, SOLUTION INTRAMUSCULAR; INTRAVENOUS
Status: DISCONTINUED | OUTPATIENT
Start: 2019-02-28 | End: 2019-02-28

## 2019-02-28 RX ORDER — OXYCODONE HYDROCHLORIDE 5 MG/1
10 TABLET ORAL EVERY 4 HOURS PRN
Status: DISCONTINUED | OUTPATIENT
Start: 2019-02-28 | End: 2019-02-28

## 2019-02-28 RX ORDER — OXYTOCIN/RINGER'S LACTATE 20/1000 ML
333 PLASTIC BAG, INJECTION (ML) INTRAVENOUS CONTINUOUS
Status: DISCONTINUED | OUTPATIENT
Start: 2019-02-28 | End: 2019-02-28

## 2019-02-28 RX ORDER — NAPROXEN 500 MG/1
500 TABLET ORAL EVERY 8 HOURS PRN
Status: DISCONTINUED | OUTPATIENT
Start: 2019-02-28 | End: 2019-03-02 | Stop reason: HOSPADM

## 2019-02-28 RX ORDER — SODIUM CITRATE AND CITRIC ACID MONOHYDRATE 334; 500 MG/5ML; MG/5ML
30 SOLUTION ORAL ONCE
Status: DISCONTINUED | OUTPATIENT
Start: 2019-02-28 | End: 2019-02-28

## 2019-02-28 RX ORDER — ONDANSETRON 2 MG/ML
4 INJECTION INTRAMUSCULAR; INTRAVENOUS EVERY 12 HOURS PRN
Status: DISCONTINUED | OUTPATIENT
Start: 2019-02-28 | End: 2019-02-28

## 2019-02-28 RX ORDER — SODIUM CITRATE AND CITRIC ACID MONOHYDRATE 334; 500 MG/5ML; MG/5ML
30 SOLUTION ORAL
Status: DISCONTINUED | OUTPATIENT
Start: 2019-02-28 | End: 2019-02-28

## 2019-02-28 RX ORDER — SODIUM CHLORIDE, SODIUM LACTATE, POTASSIUM CHLORIDE, CALCIUM CHLORIDE 600; 310; 30; 20 MG/100ML; MG/100ML; MG/100ML; MG/100ML
INJECTION, SOLUTION INTRAVENOUS CONTINUOUS PRN
Status: DISCONTINUED | OUTPATIENT
Start: 2019-02-28 | End: 2019-02-28

## 2019-02-28 RX ORDER — CEFAZOLIN SODIUM 2 G/50ML
2 SOLUTION INTRAVENOUS
Status: DISCONTINUED | OUTPATIENT
Start: 2019-02-28 | End: 2019-02-28

## 2019-02-28 RX ORDER — PHENYLEPHRINE HYDROCHLORIDE 10 MG/ML
INJECTION INTRAVENOUS
Status: DISCONTINUED | OUTPATIENT
Start: 2019-02-28 | End: 2019-02-28

## 2019-02-28 RX ORDER — DOCUSATE SODIUM 100 MG/1
200 CAPSULE, LIQUID FILLED ORAL 2 TIMES DAILY
Status: DISCONTINUED | OUTPATIENT
Start: 2019-02-28 | End: 2019-03-02 | Stop reason: HOSPADM

## 2019-02-28 RX ORDER — OXYCODONE AND ACETAMINOPHEN 5; 325 MG/1; MG/1
1 TABLET ORAL EVERY 4 HOURS PRN
Status: DISCONTINUED | OUTPATIENT
Start: 2019-02-28 | End: 2019-03-02 | Stop reason: HOSPADM

## 2019-02-28 RX ORDER — FAMOTIDINE 10 MG/ML
INJECTION INTRAVENOUS
Status: DISPENSED
Start: 2019-02-28 | End: 2019-02-28

## 2019-02-28 RX ORDER — SODIUM CHLORIDE, SODIUM LACTATE, POTASSIUM CHLORIDE, CALCIUM CHLORIDE 600; 310; 30; 20 MG/100ML; MG/100ML; MG/100ML; MG/100ML
INJECTION, SOLUTION INTRAVENOUS CONTINUOUS
Status: ACTIVE | OUTPATIENT
Start: 2019-02-28 | End: 2019-03-01

## 2019-02-28 RX ORDER — DIPHENHYDRAMINE HYDROCHLORIDE 50 MG/ML
12.5 INJECTION INTRAMUSCULAR; INTRAVENOUS
Status: DISCONTINUED | OUTPATIENT
Start: 2019-02-28 | End: 2019-02-28

## 2019-02-28 RX ORDER — BUPIVACAINE HYDROCHLORIDE 7.5 MG/ML
INJECTION, SOLUTION INTRASPINAL
Status: DISCONTINUED | OUTPATIENT
Start: 2019-02-28 | End: 2019-02-28

## 2019-02-28 RX ORDER — MUPIROCIN 20 MG/G
1 OINTMENT TOPICAL 2 TIMES DAILY
Status: DISCONTINUED | OUTPATIENT
Start: 2019-02-28 | End: 2019-03-02 | Stop reason: HOSPADM

## 2019-02-28 RX ORDER — OXYTOCIN/RINGER'S LACTATE 20/1000 ML
41.7 PLASTIC BAG, INJECTION (ML) INTRAVENOUS CONTINUOUS
Status: DISCONTINUED | OUTPATIENT
Start: 2019-02-28 | End: 2019-02-28

## 2019-02-28 RX ORDER — DIPHENHYDRAMINE HYDROCHLORIDE 50 MG/ML
12.5 INJECTION INTRAMUSCULAR; INTRAVENOUS NIGHTLY PRN
Status: DISCONTINUED | OUTPATIENT
Start: 2019-02-28 | End: 2019-02-28

## 2019-02-28 RX ORDER — METOCLOPRAMIDE HYDROCHLORIDE 5 MG/ML
INJECTION INTRAMUSCULAR; INTRAVENOUS
Status: DISPENSED
Start: 2019-02-28 | End: 2019-02-28

## 2019-02-28 RX ORDER — ACETAMINOPHEN 325 MG/1
650 TABLET ORAL EVERY 6 HOURS
Status: DISCONTINUED | OUTPATIENT
Start: 2019-02-28 | End: 2019-02-28

## 2019-02-28 RX ORDER — OXYTOCIN/RINGER'S LACTATE 20/1000 ML
41.65 PLASTIC BAG, INJECTION (ML) INTRAVENOUS CONTINUOUS
Status: ACTIVE | OUTPATIENT
Start: 2019-02-28 | End: 2019-02-28

## 2019-02-28 RX ORDER — MORPHINE SULFATE 1 MG/ML
INJECTION, SOLUTION EPIDURAL; INTRATHECAL; INTRAVENOUS
Status: DISCONTINUED | OUTPATIENT
Start: 2019-02-28 | End: 2019-02-28

## 2019-02-28 RX ORDER — METOCLOPRAMIDE HYDROCHLORIDE 5 MG/ML
10 INJECTION INTRAMUSCULAR; INTRAVENOUS ONCE
Status: COMPLETED | OUTPATIENT
Start: 2019-02-28 | End: 2019-02-28

## 2019-02-28 RX ORDER — DIPHENHYDRAMINE HYDROCHLORIDE 50 MG/ML
12.5 INJECTION INTRAMUSCULAR; INTRAVENOUS EVERY 4 HOURS PRN
Status: DISCONTINUED | OUTPATIENT
Start: 2019-02-28 | End: 2019-02-28

## 2019-02-28 RX ORDER — MORPHINE SULFATE 4 MG/ML
2 INJECTION, SOLUTION INTRAMUSCULAR; INTRAVENOUS EVERY 4 HOURS PRN
Status: ACTIVE | OUTPATIENT
Start: 2019-02-28 | End: 2019-03-01

## 2019-02-28 RX ADMIN — FENTANYL CITRATE 10 MCG: 50 INJECTION, SOLUTION INTRAMUSCULAR; INTRAVENOUS at 08:02

## 2019-02-28 RX ADMIN — PHENYLEPHRINE HYDROCHLORIDE 100 MCG: 10 INJECTION INTRAVENOUS at 08:02

## 2019-02-28 RX ADMIN — NAPROXEN 500 MG: 500 TABLET ORAL at 06:02

## 2019-02-28 RX ADMIN — MUPIROCIN 1 G: 20 OINTMENT TOPICAL at 09:02

## 2019-02-28 RX ADMIN — METOCLOPRAMIDE 10 MG: 5 INJECTION, SOLUTION INTRAMUSCULAR; INTRAVENOUS at 08:02

## 2019-02-28 RX ADMIN — BUPIVACAINE HYDROCHLORIDE 1.6 ML: 7.5 INJECTION, SOLUTION SUBARACHNOID at 08:02

## 2019-02-28 RX ADMIN — OXYTOCIN 30 UNITS: 10 INJECTION, SOLUTION INTRAMUSCULAR; INTRAVENOUS at 08:02

## 2019-02-28 RX ADMIN — SODIUM CHLORIDE, SODIUM LACTATE, POTASSIUM CHLORIDE, AND CALCIUM CHLORIDE: .6; .31; .03; .02 INJECTION, SOLUTION INTRAVENOUS at 05:02

## 2019-02-28 RX ADMIN — FAMOTIDINE 20 MG: 10 INJECTION, SOLUTION INTRAVENOUS at 08:02

## 2019-02-28 RX ADMIN — SODIUM CITRATE AND CITRIC ACID MONOHYDRATE 30 ML: 500; 334 SOLUTION ORAL at 08:02

## 2019-02-28 RX ADMIN — SODIUM CHLORIDE, SODIUM LACTATE, POTASSIUM CHLORIDE, AND CALCIUM CHLORIDE: .6; .31; .03; .02 INJECTION, SOLUTION INTRAVENOUS at 08:02

## 2019-02-28 RX ADMIN — HYDROMORPHONE HYDROCHLORIDE 1 MG: 1 INJECTION, SOLUTION INTRAMUSCULAR; INTRAVENOUS; SUBCUTANEOUS at 11:02

## 2019-02-28 RX ADMIN — SODIUM CHLORIDE, SODIUM LACTATE, POTASSIUM CHLORIDE, AND CALCIUM CHLORIDE 1000 ML: .6; .31; .03; .02 INJECTION, SOLUTION INTRAVENOUS at 08:02

## 2019-02-28 RX ADMIN — MORPHINE SULFATE 0.2 MG: 1 INJECTION, SOLUTION EPIDURAL; INTRATHECAL; INTRAVENOUS at 08:02

## 2019-02-28 RX ADMIN — PHENYLEPHRINE HYDROCHLORIDE 100 MCG: 10 INJECTION INTRAVENOUS at 09:02

## 2019-02-28 RX ADMIN — DOCUSATE SODIUM 200 MG: 100 CAPSULE, LIQUID FILLED ORAL at 09:02

## 2019-02-28 RX ADMIN — MUPIROCIN: 20 OINTMENT TOPICAL at 08:02

## 2019-02-28 NOTE — ANESTHESIA PROCEDURE NOTES
Spinal    Diagnosis: previous csection  Patient location during procedure: OB  Start time: 2/28/2019 8:27 AM  Timeout: 2/28/2019 8:27 AM  End time: 2/28/2019 8:33 AM  Staffing  Anesthesiologist: Constantine Bey MD  Resident/CRNA: Anna Marin MD  Performed: resident/CRNA and anesthesiologist   Preanesthetic Checklist  Completed: patient identified, site marked, surgical consent, pre-op evaluation, timeout performed, IV checked, risks and benefits discussed and monitors and equipment checked  Spinal Block  Patient position: sitting  Prep: ChloraPrep  Patient monitoring: heart rate, cardiac monitor, continuous pulse ox and frequent blood pressure checks  Approach: midline  Location: L3-4  Injection technique: single shot  CSF Fluid: clear free-flowing CSF  Needle  Needle type: pencil-tip   Needle gauge: 24 G  Needle length: 5 in  Additional Documentation: incremental injection and negative aspiration for heme  Needle localization: anatomical landmarks  Assessment  Sensory level: T4   Dermatomal levels determined by alcohol wipe  Ease of block: moderate  Patient's tolerance of the procedure: comfortable throughout block and no complaints

## 2019-02-28 NOTE — NURSING TRANSFER
Nursing Transfer Note      2/28/2019     Transfer To: 303    Transfer via bed    Transfer with infant     Transported by MARIA blackman and [facility]    Medicines sent: yes    Chart send with patient: Yes    Notified: spouse    Patient reassessed at: 02/28/19 at 11:25 am     Upon arrival to floor: patient oriented to room, call bell in reach and bed in lowest position

## 2019-02-28 NOTE — L&D DELIVERY NOTE
[facility]   Section   Operative Note    SUMMARY     Date of Procedure: 2019     Procedure: Procedure(s) (LRB):   SECTION (N/A)    Surgeon(s) and Role:     * Ascencion Fajardo MD - Primary    OPERATIVE NOTE       SUMMARY      Surgery Date: 2019     SURGEON: Ascencion Fajardo   ASSISTANT: Dorita Henning     PREOP DIAGNOSIS: IUP @ 39 WGA                                        Hx C/S x1                                                   PROCEDURES:  Repeat Low transverse  section   ANESTHESIA: spinal    FINDINGS/KEY COMPONENTS:   One viable  sex  M     Apgar 9/9  Normal  ,placenta , clear amniotic fluid  Normal  Uterus, tubes and ovaries     ESTIMATED BLOOD LOSS: 350    UOP:   Urine clear at the end of procedure     COMPLICATIONS: None    SPECIMEN / PATHOLOGY:   Specimens     Bilateral Fallopian tube segments         PROCEDURE:    The patient was taken to the operating room where spinal anesthesia was found to be adequate.  She was prepped and draped in the normal sterile fashion.    A midline Laparotomy incision was performed and carried down to the lower layer of fascia with the scalpel trough the subcutaneous tissue .    The fascia was incised in the mid-line in a cephalo-caudal fashion   The rectus muscles were  in the mid-line.    The peritoneum was grasped with hemostats and entered in with Metzenbaum scissors .  This incision was extended superiorly and inferiorly with good visualization of the bladder. Bladder flap was created , with Metzenbaum scissors, and bladder was pushed down exposing the  uterine segment.    The bladder blade was inserted.  A low transverse incision was made on the uterus.  This incision was extended laterally with finger fracture.  The infant's head was delivered atraumatically.  The cord was clamped and cut.  The infant was handed to the waiting nurse. Umbilical cord blood sample obtained   The placenta was delivered  manually   The uterus was exteriorized and cleared of all clots and debris.  The uterine incision was repaired with #1 Chromic in a running, locked fashion.  Excellent hemostasis was noted.  The posterior gutter was cleared of all clots and debris.    The uterus was returned to the abdomen.  The incision was again inspected and found to be hemostatic.  The peritoneum was reapproximated with 2-0 Vicryl in a running fashion.  The rectus muscles were reapproximated in a mattress fashion with #1 Chromic.  The fascia was reapproximated with #1 PDS in a running fashion.  The subcutaneous tissue was reapproximated with 2-0 Plain catgut .  Skin was closed with staples   Sponge, lap, needle counts were correct x 2.    Patient was taken to the recover room awake and in stable condition with the Savage draining clear urine.      Ascencion Fajardo M.D.   OB/GYN    2019       Specimens:   Specimen (12h ago, onward)    None          Condition: Good    Disposition: PACU - hemodynamically stable.    Attestation: Good         Delivery Information for  Ariel Akbar    Birth information:  YOB: 2019   Time of birth: 8:45 AM   Sex: male   Head Delivery Date/Time: 2019  8:45 AM   Delivery type: , Classical   Gestational Age: 39w0d    Delivery Providers    Delivering clinician:  Ascencion Fajardo MD   Provider Role    Fariha Stevens RN     Vidhi Encarnacion             Measurements    Weight:  4216 g  Length:           Apgars    Living status:  Living  Apgars:   1 min.:   5 min.:   10 min.:   15 min.:   20 min.:     Skin color:   1  1       Heart rate:   2  2       Reflex irritability:   2  2       Muscle tone:   2  2       Respiratory effort:   2  2       Total:   9  9       Apgars assigned by:  NURSERY         Operative Delivery    Forceps attempted?:  No  Vacuum extractor attempted?:  No         Shoulder Dystocia    Shoulder dystocia present?:  No           Presentation    Presentation:   Vertex  Position:  Occiput Anterior           Interventions/Resuscitation    Method:  Bulb Suctioning, Tactile Stimulation       Cord    Vessels:  3 vessels  Complications:  None  Delayed Cord Clamping?:  No  Cord Blood Disposition:  Lab  Gases Sent?:  No  Stem Cell Collection (by MD):  No       Placenta    Placenta delivery date/time:  2019 0847  Placenta removal:  Spontaneous  Placenta appearance:  Intact  Placenta disposition:  discarded           Labor Events:       labor: No     Labor Onset Date/Time:         Dilation Complete Date/Time:         Start Pushing Date/Time:       Rupture Date/Time:              Rupture type:           Fluid Amount:        Fluid Color:        Fluid Odor:        Membrane Status (PeriCalm):        Rupture Date/Time (PeriCalm):        Fluid Amount (PeriCalm):        Fluid Color (PeriCalm):         steroids: None     Antibiotics given for GBS: No     Induction:       Indications for induction:        Augmentation:       Indications for augmentation:       Labor complications: None     Additional complications:          Cervical ripening:                     Delivery:      Episiotomy: None     Indication for Episiotomy:       Perineal Lacerations: None Repaired:      Periurethral Laceration:   Repaired:     Labial Laceration:   Repaired:     Sulcus Laceration:   Repaired:     Vaginal Laceration:   Repaired:     Cervical Laceration:   Repaired:     Repair suture:       Repair # of packets:       Vaginal delivery QBL (mL): 0      QBL (mL): 710     Combined Blood Loss (mL): 710     Vaginal Sweep Performed:       Surgicount Correct:         Other providers:       Anesthesia    Method:  Spinal          Details (if applicable):  Trial of Labor No    Categorization: Repeat    Priority: Routine   Indications for : Repeat Section   Incision Type: low transverse     Additional  information:  Forceps:    Vacuum:    Breech:     Observed anomalies    Other (Comments):         Ascencion Fajardo M.D.   OB/GYN    2/28/2019

## 2019-02-28 NOTE — ANESTHESIA PREPROCEDURE EVALUATION
2019  Radha Akbar is a 23 y.o., female G3 here for scheduled csection under spinal.       Past Medical History:   Diagnosis Date    Depression      Past Surgical History:   Procedure Laterality Date     SECTION       No current facility-administered medications on file prior to encounter.      Current Outpatient Medications on File Prior to Encounter   Medication Sig Dispense Refill    aspirin (ECOTRIN) 81 MG EC tablet Take 1 tablet (81 mg total) by mouth once daily. 150 tablet 2    PRENATAL 19 29 mg iron- 1 mg Chew CHEW 1 T PO  ONCE DAILY  3       Anesthesia Evaluation    I have reviewed the Patient Summary Reports.    I have reviewed the Nursing Notes.   I have reviewed the Medications.     Review of Systems  Anesthesia Hx:  No problems with previous Anesthesia  Personal Hx of Anesthesia complications   Social:  Non-Smoker    Hematology/Oncology:     Oncology Normal     Cardiovascular:   Exercise tolerance: good Denies Hypertension.     Pulmonary:  Pulmonary Normal  Denies Asthma.    Musculoskeletal:  Musculoskeletal Normal    OB[facility]N/PEDS:  [facility]   Neurological:  Neurology Normal    Psych:   depression            Chemistry        Component Value Date/Time     (L) 2018    K 3.3 (L) 2018     2018    CO2 19 (L) 2018    BUN 5 (L) 2018    CREATININE 0.7 2018     (H) 2018        Component Value Date/Time    CALCIUM 9.2 2018    ALKPHOS 117 2018    AST 16 2018    ALT 9 (L) 2018    BILITOT 0.5 2018    ESTGFRAFRICA >60 2018    EGFRNONAA >60 2018            Physical Exam  General:  Obesity    Airway/Jaw/Neck:  Airway Findings: Mouth Opening: Small, but > 3cm Tongue: Large  General Airway  Assessment: Adult, Possible difficult intubation  Mallampati: IV  Improves to IV with phonation.  TM Distance: 4 - 6 cm      Dental:  Dental Findings: In tact   Chest/Lungs:  Chest/Lungs Findings: Clear to auscultation     Heart/Vascular:  Heart Findings: Rate: Normal  Rhythm: Regular Rhythm        Mental Status:  Mental Status Findings:  Alert and Oriented, Cooperative         Anesthesia Plan  Type of Anesthesia, risks & benefits discussed:  Anesthesia Type:  spinal  Patient's Preference:   Intra-op Monitoring Plan: standard ASA monitors  Intra-op Monitoring Plan Comments:   Post Op Pain Control Plan:   Post Op Pain Control Plan Comments:   Induction:    Beta Blocker:  Patient is not currently on a Beta-Blocker (No further documentation required).       Informed Consent: Patient understands risks and agrees with Anesthesia plan.  Questions answered. Anesthesia consent signed with patient.  ASA Score: 2     Day of Surgery Review of History & Physical: I have interviewed and examined the patient. I have reviewed the patient's H&P dated:  There are no significant changes.          Ready For Surgery From Anesthesia Perspective.

## 2019-02-28 NOTE — LACTATION NOTE
[facility]  Lactation Note - Mom    SUMMARY     Maternal Assessment    Breast Shape: Bilateral:, wide  Breast Density: Bilateral:, soft  Nipples: Bilateral:, everted, graspable  Preferred Pain Scale: number (Numeric Rating Pain Scale)  Comfort/Acceptable Pain Level: 3  Pain Body Location - Orientation: lower  Pain Body Location: abdomen  Pain Rating (0-10): Rest: 3  Pain Rating (0-10): Activity: 3  Frequency: intermittent  Quality: aching, cramping, other (see comments)(sore)  Pain Management Interventions: medication offered but refused, pain management plan reviewed with patient/caregiver  Sleep/Rest/Relaxation: no problem identified, awake  POSS (Pasero Opioid-Induced Sed Scale): 1 - Awake and alert  Fever Reduction/Comfort Measures: (rest)    LATCH Score         Breasts WDL    Breast WDL: WDL    Maternal Infant Feeding    Maternal Preparation: breast care  Maternal Emotional State: independent, relaxed  Infant Positioning: side-lying  Pain with Feeding: no  Comfort Measures Before/During Feeding: infant position adjusted  Comfort Measures Following Feeding: air-drying encouraged  Latch Assistance: no(pt declined assistance, states comfortable with latching wel)    Lactation Referrals         Lactation Interventions               Breastfeeding Session    Infant Positioning: side-lying    Maternal Information    Infant Reason for Referral:  infant

## 2019-02-28 NOTE — PLAN OF CARE
Problem: Adult Inpatient Plan of Care  Goal: Plan of Care Review  Outcome: Ongoing (interventions implemented as appropriate)  1200 - vss, nad, pain well controlled, tolerating clears, Pit #2 infusing, x2 small areas marked on dressing, light lochia.  Poc: pain management as needed, bedrest for now, IV fluids, continue to monitor.  Reviewed poc w/pt and significant other.  Pt verbalized understanding.  Interpreting services offered.  Pt requested that significant other interpret.

## 2019-02-28 NOTE — H&P
UPDATED HISTORY AND PHYSICAL        2019  Subjective:       Radha Akbar is a 23 y.o.  female   with IUP at 39w0d weeks gestation who presents for scheduled repeat  delivery   .  This IUP is complicated by Hx c/s x1                                              Hx pyelo this IUP  .    Patient denies contractions, denies vaginal bleeding, denies LOF.   Fetal Movement: normal.     PMHx:   Past Medical History:   Diagnosis Date    Depression        PSHx:   Past Surgical History:   Procedure Laterality Date     SECTION         All: Review of patient's allergies indicates:  No Known Allergies    Meds:   Facility-Administered Medications Prior to Admission   Medication Dose Route Frequency Provider Last Rate Last Dose    levonorgestrel 20 mcg/24 hr (5 years) IUD 1 Intra Uterine Device  1 each Intrauterine 1 time in Clinic/HOD Ascencion Fajardo MD         Medications Prior to Admission   Medication Sig Dispense Refill Last Dose    aspirin (ECOTRIN) 81 MG EC tablet Take 1 tablet (81 mg total) by mouth once daily. 150 tablet 2 Taking    levonorgestrel (MIRENA) 20 mcg/24 hr (5 years) IUD 1 Intra Uterine Device by Intrauterine route once. for 1 dose 1 Intra Uterine Device 0     PRENATAL 19 29 mg iron- 1 mg Chew CHEW 1 T PO  ONCE DAILY  3 Taking       SH:   Social History     Socioeconomic History    Marital status: Single     Spouse name: Not on file    Number of children: Not on file    Years of education: Not on file    Highest education level: Not on file   Social Needs    Financial resource strain: Not on file    Food insecurity - worry: Not on file    Food insecurity - inability: Not on file    Transportation needs - medical: Not on file    Transportation needs - non-medical: Not on file   Occupational History    Not on file   Tobacco Use    Smoking status: Never Smoker    Smokeless tobacco: Never Used   Substance and Sexual Activity    Alcohol use: No      Frequency: Never    Drug use: No    Sexual activity: Yes     Partners: Male   Other Topics Concern    Not on file   Social History Narrative    Not on file       FH:   Family History   Problem Relation Age of Onset    Diabetes Mother        OBHx:   Obstetric History       T1      L1     SAB1   TAB0   Ectopic0   Multiple0   Live Births1       # Outcome Date GA Lbr Oleksandr/2nd Weight Sex Delivery Anes PTL Lv   3 Current            2 Term 11/10/11 40w0d   M CS-Unspec   ALMA   1 SAB                   Review of Systems: Non contributory      Objective:       LMP 2018 (Approximate)   Breastfeeding? No     There were no vitals filed for this visit.    General:   alert, appears stated age and cooperative   Lungs:   clear to auscultation bilaterally   Heart:   regular rate and rhythm, S1, S2 normal, no murmur, click, rub or gallop   Abdomen:  soft, non-tender; bowel sounds normal; no masses,  no organomegaly   Extremities negative edema, negative erythema   FHT: 155 Cat 1 (reassuring)                 TOCO: Q 0 minutes   Presentations: cephalic by ultrasound   Cervix:     Dilation:     Effacement:     Station:      Consistency:     Position:          Lab Review  Blood Type O POS       Assessment:       39w0d weeks gestation.  Hx C/S x1  Hx Pyelo this IUP    Patient Active Problem List   Diagnosis    Pregnancy    Pyelonephritis affecting pregnancy in second trimester    Hyperglycemia - DM screen scheduled    Positive fetal fibronectin at 22 weeks to 34 weeks gestation    Encounter for triage in pregnant patient    Screening examination for sexually transmitted disease          Plan:      Risks, benefits, alternatives and possible complications have been discussed in detail with the patient.   - Consents signed and to chart  - Admit to Labor and Delivery unit  -Spinal per anesthesia  -Will proceed with scheduled procedure, patient agrees .             Ascencion Fajardo M.D.   OB/GYN    2019

## 2019-02-28 NOTE — TRANSFER OF CARE
"Anesthesia Transfer of Care Note    Patient: Radha Akbar    Procedure(s) Performed: Procedure(s) (LRB):   SECTION (N/A)    Patient location: Labor and Delivery    Anesthesia Type: spinal    Transport from OR: Transported from OR on room air with adequate spontaneous ventilation    Post pain: adequate analgesia    Post assessment: no apparent anesthetic complications and tolerated procedure well    Post vital signs: stable    Level of consciousness: awake, alert and oriented    Complications: none    Transfer of care protocol was followed      Last vitals:   Visit Vitals  /88   Pulse 80   Temp 36.6 °C (97.8 °F) (Oral)   Ht 5' 2" (1.575 m)   Wt 81.6 kg (180 lb)   LMP 2018 (Approximate)   SpO2 100%   Breastfeeding? No   BMI 32.92 kg/m²     "

## 2019-02-28 NOTE — PLAN OF CARE
Problem: Adult Inpatient Plan of Care  Goal: Plan of Care Review  Outcome: Ongoing (interventions implemented as appropriate)  Mother will exclusively breastfeed on cue 8 or more times in 24 hours. Will record voids/stools. Will monitor baby for signs of adequate feedings. Will call for assistance if needed. Family supportive at bedside.

## 2019-03-01 PROBLEM — D62 ACUTE BLOOD LOSS ANEMIA: Status: ACTIVE | Noted: 2019-03-01

## 2019-03-01 LAB
BASOPHILS # BLD AUTO: 0.02 K/UL
BASOPHILS NFR BLD: 0.2 %
DIFFERENTIAL METHOD: ABNORMAL
EOSINOPHIL # BLD AUTO: 0.1 K/UL
EOSINOPHIL NFR BLD: 0.4 %
ERYTHROCYTE [DISTWIDTH] IN BLOOD BY AUTOMATED COUNT: 16.8 %
HCT VFR BLD AUTO: 24 %
HGB BLD-MCNC: 7.5 G/DL
LYMPHOCYTES # BLD AUTO: 3.8 K/UL
LYMPHOCYTES NFR BLD: 30.8 %
MCH RBC QN AUTO: 27 PG
MCHC RBC AUTO-ENTMCNC: 31.3 G/DL
MCV RBC AUTO: 86 FL
MONOCYTES # BLD AUTO: 0.8 K/UL
MONOCYTES NFR BLD: 6.5 %
NEUTROPHILS # BLD AUTO: 7.7 K/UL
NEUTROPHILS NFR BLD: 61.9 %
PLATELET # BLD AUTO: 289 K/UL
PMV BLD AUTO: 10.2 FL
RBC # BLD AUTO: 2.78 M/UL
RPR SER QL: NORMAL
WBC # BLD AUTO: 12.39 K/UL

## 2019-03-01 PROCEDURE — 99232 SBSQ HOSP IP/OBS MODERATE 35: CPT | Mod: ,,, | Performed by: OBSTETRICS & GYNECOLOGY

## 2019-03-01 PROCEDURE — 99900035 HC TECH TIME PER 15 MIN (STAT)

## 2019-03-01 PROCEDURE — 11000001 HC ACUTE MED/SURG PRIVATE ROOM

## 2019-03-01 PROCEDURE — 99232 PR SUBSEQUENT HOSPITAL CARE,LEVL II: ICD-10-PCS | Mod: ,,, | Performed by: OBSTETRICS & GYNECOLOGY

## 2019-03-01 PROCEDURE — 36415 COLL VENOUS BLD VENIPUNCTURE: CPT

## 2019-03-01 PROCEDURE — 25000003 PHARM REV CODE 250: Performed by: OBSTETRICS & GYNECOLOGY

## 2019-03-01 PROCEDURE — 85025 COMPLETE CBC W/AUTO DIFF WBC: CPT

## 2019-03-01 PROCEDURE — 94799 UNLISTED PULMONARY SVC/PX: CPT

## 2019-03-01 RX ORDER — OXYCODONE AND ACETAMINOPHEN 5; 325 MG/1; MG/1
1 TABLET ORAL EVERY 4 HOURS PRN
Qty: 20 TABLET | Refills: 0 | Status: SHIPPED | OUTPATIENT
Start: 2019-03-01 | End: 2019-04-15

## 2019-03-01 RX ORDER — NAPROXEN 500 MG/1
500 TABLET ORAL EVERY 8 HOURS PRN
Qty: 30 TABLET | Refills: 0 | Status: SHIPPED | OUTPATIENT
Start: 2019-03-01 | End: 2019-03-13

## 2019-03-01 RX ORDER — FERROUS SULFATE 325(65) MG
325 TABLET, DELAYED RELEASE (ENTERIC COATED) ORAL
Refills: 0 | COMMUNITY
Start: 2019-03-01 | End: 2019-03-13

## 2019-03-01 RX ORDER — FERROUS SULFATE 325(65) MG
325 TABLET, DELAYED RELEASE (ENTERIC COATED) ORAL 2 TIMES DAILY
Status: DISCONTINUED | OUTPATIENT
Start: 2019-03-01 | End: 2019-03-02 | Stop reason: HOSPADM

## 2019-03-01 RX ADMIN — NAPROXEN 500 MG: 500 TABLET ORAL at 06:03

## 2019-03-01 RX ADMIN — DOCUSATE SODIUM 200 MG: 100 CAPSULE, LIQUID FILLED ORAL at 08:03

## 2019-03-01 RX ADMIN — SODIUM CHLORIDE, SODIUM LACTATE, POTASSIUM CHLORIDE, AND CALCIUM CHLORIDE: .6; .31; .03; .02 INJECTION, SOLUTION INTRAVENOUS at 01:03

## 2019-03-01 RX ADMIN — FERROUS SULFATE TAB EC 325 MG (65 MG FE EQUIVALENT) 325 MG: 325 (65 FE) TABLET DELAYED RESPONSE at 08:03

## 2019-03-01 RX ADMIN — OXYCODONE HYDROCHLORIDE AND ACETAMINOPHEN 1 TABLET: 5; 325 TABLET ORAL at 01:03

## 2019-03-01 RX ADMIN — FERROUS SULFATE TAB EC 325 MG (65 MG FE EQUIVALENT) 325 MG: 325 (65 FE) TABLET DELAYED RESPONSE at 11:03

## 2019-03-01 RX ADMIN — OXYCODONE HYDROCHLORIDE AND ACETAMINOPHEN 1 TABLET: 5; 325 TABLET ORAL at 06:03

## 2019-03-01 RX ADMIN — OXYCODONE HYDROCHLORIDE AND ACETAMINOPHEN 1 TABLET: 10; 325 TABLET ORAL at 01:03

## 2019-03-01 RX ADMIN — MUPIROCIN 1 G: 20 OINTMENT TOPICAL at 08:03

## 2019-03-01 NOTE — PROGRESS NOTES
1930    Language line offered and patient accepted.     Review pain management plan, medication. Explained urinary turcios will come out at 0600 am, and SCD's will remove once she ambulates. Patient verbalized understanding.     Crib safety, safe sleep for , temperature regulation. Feeding frequency reviewed with mother, 8 or more times per 24 hr period. Breast feeding  Guide  Reviewed and at bedside.

## 2019-03-01 NOTE — PHYSICIAN QUERY
"PT Name: Radha Akbar  MR #: [id number]    Physician Query Form - Hematology Clarification      CDS/: CORONA Silver,RNC-[email or web address]         Contact information:[email or web address]    This form is a permanent document in the medical record.      Query Date: 2019    By submitting this query, we are merely seeking further clarification of documentation. Please utilize your independent clinical judgment when addressing the question(s) below.    The Medical record contains the following:   Indicators  Supporting Clinical Findings Location in Medical Record    "Anemia" documented     X H & H = Hgb=7.5-11.7  Hct=24.0-37.0 LAB -3/1    BP =                     HR=      "GI bleeding" documented     X Acute bleeding (Non GI site) Combined Blood Loss (mL):710 L&D Delivery note     Transfusion(s)      Treatment:     X Other:  PROCEDURES:  Repeat Low transverse  section  L&D Delivery note      Provider, please specify diagnosis or diagnoses associated with above clinical findings.    [  x] Acute blood loss anemia expected post-operatively   [  ] Acute blood loss anemia     [  ] Other Hematological Diagnosis (please specify):     [  ] Clinically Undetermined       Please document in your progress notes daily for the duration of treatment, until resolved, and include in your discharge summary.                                                                                                      "

## 2019-03-01 NOTE — PLAN OF CARE
Problem: Adult Inpatient Plan of Care  Goal: Plan of Care Review  Outcome: Ongoing (interventions implemented as appropriate)  POC reviewed with pt around 0750; JAISON Leslie, and Janae, , helped to interpret for RN throughout shift.  Pt verbalized acceptance and understanding.  Pt's VS stable.  Remains free from falls and injury.  Voiding spontaneously without difficulty post turcios catheter removal.  Pain controlled with ordered meds.  Bonding well with baby.  Exclusively breastfeeding.  Baby tolerating feedings; voiding/stooling appropriately.  Family at bedside to offer support.  WCTM.

## 2019-03-01 NOTE — PLAN OF CARE
Problem: Adult Inpatient Plan of Care  Goal: Plan of Care Review  Outcome: Ongoing (interventions implemented as appropriate)  Pt on room air in no apparent distress.  IS tx. Given with good pt. Effort.  Will cont. To monitor.

## 2019-03-01 NOTE — PROGRESS NOTES
" Radha Akbar is a 23 y.o. female   POD #1 status post  Repeat  section. has no problems, feels well, no complaints  Patient reports none abd pain that is well Relieved by Oral pain medications. Lochia is mild to moderate  and decreasing, Voiding without difficulty Ambulating with no difficulty, has passed flatus, has not had BM,  Patient does not plan to breast feed.    Objective:       /70 (BP Location: Right arm, Patient Position: Lying)   Pulse 96   Temp 98.3 °F (36.8 °C) (Oral)   Resp 18   Ht 5' 2" (1.575 m)   Wt 81.6 kg (180 lb)   LMP 2018 (Approximate)   SpO2 97%   Breastfeeding? Unknown   BMI 32.92 kg/m²   Vitals:    19 2000 19 0000 19 0400 19 0755   BP: 105/82 (!) 112/59 113/70    BP Location: Right arm Right arm Right arm    Patient Position: Lying Lying Lying    Pulse: 105 90 95 96   Resp: 18 18 18 18   Temp: 98.1 °F (36.7 °C) 98.2 °F (36.8 °C) 98.3 °F (36.8 °C)    TempSrc: Oral Oral Oral    SpO2:    97%   Weight:       Height:         General:   alert, appears stated age and cooperative   Lungs:   clear to auscultation bilaterally   Heart:   regular rate and rhythm, S1, S2 normal, no murmur, click, rub or gallop   Abdomen:  soft, non-tender; bowel sounds normal; no masses,  no organomegaly   Uterus:  firm located at the umblicus.        Extremities: peripheral pulses normal, no pedal edema, no clubbing or cyanosis     Lab Review  Recent Results (from the past 72 hour(s))   CBC auto differential    Collection Time: 19  6:38 AM   Result Value Ref Range    WBC 11.74 3.90 - 12.70 K/uL    RBC 4.26 4.00 - 5.40 M/uL    Hemoglobin 11.7 (L) 12.0 - 16.0 g/dL    Hematocrit 37.0 37.0 - 48.5 %    MCV 87 82 - 98 fL    MCH 27.5 27.0 - 31.0 pg    MCHC 31.6 (L) 32.0 - 36.0 g/dL    RDW 16.7 (H) 11.5 - 14.5 %    Platelets 354 (H) 150 - 350 K/uL    MPV 10.4 9.2 - 12.9 fL    Gran # (ANC) 6.6 1.8 - 7.7 K/uL    Lymph # 4.4 1.0 - 4.8 K/uL    Mono # 0.6 0.3 - 1.0 " K/uL    Eos # 0.0 0.0 - 0.5 K/uL    Baso # 0.02 0.00 - 0.20 K/uL    Gran% 56.4 38.0 - 73.0 %    Lymph% 37.6 18.0 - 48.0 %    Mono% 5.2 4.0 - 15.0 %    Eosinophil% 0.3 0.0 - 8.0 %    Basophil% 0.2 0.0 - 1.9 %    Differential Method Automated    Type & Screen, Labor & Delivery    Collection Time: 19  6:38 AM   Result Value Ref Range    Group & Rh O POS     Indirect Zee NEG    CBC auto differential    Collection Time: 19  6:51 AM   Result Value Ref Range    WBC 12.39 3.90 - 12.70 K/uL    RBC 2.78 (L) 4.00 - 5.40 M/uL    Hemoglobin 7.5 (L) 12.0 - 16.0 g/dL    Hematocrit 24.0 (L) 37.0 - 48.5 %    MCV 86 82 - 98 fL    MCH 27.0 27.0 - 31.0 pg    MCHC 31.3 (L) 32.0 - 36.0 g/dL    RDW 16.8 (H) 11.5 - 14.5 %    Platelets 289 150 - 350 K/uL    MPV 10.2 9.2 - 12.9 fL    Gran # (ANC) 7.7 1.8 - 7.7 K/uL    Lymph # 3.8 1.0 - 4.8 K/uL    Mono # 0.8 0.3 - 1.0 K/uL    Eos # 0.1 0.0 - 0.5 K/uL    Baso # 0.02 0.00 - 0.20 K/uL    Gran% 61.9 38.0 - 73.0 %    Lymph% 30.8 18.0 - 48.0 %    Mono% 6.5 4.0 - 15.0 %    Eosinophil% 0.4 0.0 - 8.0 %    Basophil% 0.2 0.0 - 1.9 %    Differential Method Automated        I/O    Intake/Output Summary (Last 24 hours) at 3/1/2019 0820  Last data filed at 3/1/2019 0600  Gross per 24 hour   Intake 1000 ml   Output 3060 ml   Net -2060 ml        Assessment:     Patient Active Problem List   Diagnosis    Pregnancy    39 weeks gestation of pregnancy    Hx of  section    Hx of preeclampsia, prior pregnancy, currently pregnant    Hx of acute pyelonephritis    Screening examination for sexually transmitted disease         Acute Blood loss anemia        Plan:   1. Patient doing well. Continue routine management and advances.  2. Continue PO pain meds. Pain well controlled.  3. H/h 7.5/24.   4. Encourage ambulation.   5- PO Iron     Ascencion Fajardo M.D.   OB/GYN    3/1/2019

## 2019-03-01 NOTE — PLAN OF CARE
Problem: Adult Inpatient Plan of Care  Goal: Plan of Care Review  Outcome: Ongoing (interventions implemented as appropriate)  Alert, oriented x4. Fuundus firm. Savage removed at 0600. Patient Due to void. Will continue to provide 8 or more feedings per 24 hr period.

## 2019-03-02 VITALS
SYSTOLIC BLOOD PRESSURE: 118 MMHG | DIASTOLIC BLOOD PRESSURE: 65 MMHG | WEIGHT: 180 LBS | HEIGHT: 62 IN | RESPIRATION RATE: 18 BRPM | TEMPERATURE: 98 F | HEART RATE: 98 BPM | OXYGEN SATURATION: 98 % | BODY MASS INDEX: 33.13 KG/M2

## 2019-03-02 PROCEDURE — 25000003 PHARM REV CODE 250: Performed by: OBSTETRICS & GYNECOLOGY

## 2019-03-02 PROCEDURE — 94799 UNLISTED PULMONARY SVC/PX: CPT

## 2019-03-02 PROCEDURE — 99238 PR HOSPITAL DISCHARGE DAY,<30 MIN: ICD-10-PCS | Mod: ,,, | Performed by: OBSTETRICS & GYNECOLOGY

## 2019-03-02 PROCEDURE — 99238 HOSP IP/OBS DSCHRG MGMT 30/<: CPT | Mod: ,,, | Performed by: OBSTETRICS & GYNECOLOGY

## 2019-03-02 PROCEDURE — 94761 N-INVAS EAR/PLS OXIMETRY MLT: CPT

## 2019-03-02 RX ADMIN — OXYCODONE HYDROCHLORIDE AND ACETAMINOPHEN 1 TABLET: 10; 325 TABLET ORAL at 06:03

## 2019-03-02 RX ADMIN — FERROUS SULFATE TAB EC 325 MG (65 MG FE EQUIVALENT) 325 MG: 325 (65 FE) TABLET DELAYED RESPONSE at 08:03

## 2019-03-02 RX ADMIN — OXYCODONE HYDROCHLORIDE AND ACETAMINOPHEN 1 TABLET: 10; 325 TABLET ORAL at 12:03

## 2019-03-02 RX ADMIN — DOCUSATE SODIUM 200 MG: 100 CAPSULE, LIQUID FILLED ORAL at 08:03

## 2019-03-02 RX ADMIN — NAPROXEN 500 MG: 500 TABLET ORAL at 06:03

## 2019-03-02 RX ADMIN — MUPIROCIN 1 G: 20 OINTMENT TOPICAL at 08:03

## 2019-03-02 RX ADMIN — OXYCODONE HYDROCHLORIDE AND ACETAMINOPHEN 1 TABLET: 10; 325 TABLET ORAL at 04:03

## 2019-03-02 NOTE — PLAN OF CARE
Problem: Adult Inpatient Plan of Care  Goal: Plan of Care Review  Outcome: Ongoing (interventions implemented as appropriate)     Patient ambulating freely in room.  Lochia rubra &  light.  Fundus firm & below umbilicus. Voiding and passing flatus. Tolerating regular diet.  POC reviewed with pt in Russian; able to verbalize acceptance and understanding.  Questions answered/encouraged; reassurance provided.  Pt states pain goal met with prescribed medications per MD.  Bonding with baby AEB holding, feeding, dressing, and changing baby's diaper. Smiles appropriately at baby.   VS stable. Call light in reach, side rails up x2, bed in lowest position with wheels locked.  Remains free from falls and/or injury. NAD noted.  Will continue to monitor.

## 2019-03-02 NOTE — LACTATION NOTE
This note was copied from a baby's chart.  Mother requests formula for infant. Mother states original feeding plan always was to both breast and formula feed.     Information reviewed in Czech on benefits of exclusive breastfeeding, supply and demand, adequacy of colostrum, feeding frequency and normal  feeding patterns. Informed about risks of formula feeding, nipple confusion, and decreased milk supply. After education, mother still chooses to formula feed.  .      Discussed proper hand washing, expiration time of formula, position of baby, position of nipple and bottle while feeding, baby led paced feeding and fullness cues.  Pt verbalized understanding and verbalized appropriate recall.

## 2019-03-02 NOTE — DISCHARGE INSTRUCTIONS
"Instrucciones Para Karmen de Arin    Instrucciones a Seguir    Dieta regular  Actividad: Aumentarntar gradualmente  Rashida: Regadera o Riri  Restricciones: No levantar nada pesado  Cuidado Personal: No tampones o duchas vaginales  Actividad Sexual: Dayanara relaciones sexuales  Planificacion Familiar: Consulta con portillo medico  Cuidado de los Senos: Use un sosten de soporte  Regresar al trabajo/escuela: Cuando portillo medico le indique    Cuando debe llamar al Doctor    *Fiebre de 100.4 o mas alto  *Nausea/Vomito persistente  *Secrecion de la incision  *Laura sangrado vaginal o coagulos  *Inflamacion/dolor en los brazos o las piernas  *Severo dolor de emi, vision borrosa or desmayos  *Frequencia/ardor urinario  *Senales de depresion post-parto        La Depresion Post-Parto    Usted acaba de tener un renetta'.  A pesar de que sabe que deberia sentirse emocionada y gardner, lo que seinte son ganas de llorar sin motivo y tiene dificultades para realizar gabriel tareas diarias.  Usted pasa la mayor parte del tiempo debbie, cansada y desesperanzada, y hasta podria sentirse avergonzada o culpable.  Nely lo que le esta sucediendo no es culpa suya, y puede sentirse mejor.  Hable con portillo medico para que la ayude.     La Depresion Despues del Parto    Corky vez sienta cansancio y ganas de llorar danny despues de karmen a wiliam.  Esta etapa melancolica, denominada "baby blues", puede hacerla sentir debbie y desesperanzada, o temerosa de que algo sofia le vaya a pasar al renetta.  Algunas mujeres hasta llegan a poner en basilio el que puedan ser buenas madres.    Que' es la Depresion?    La depresion es un trastorno del animo que afecta portillo manera de pernsar y de sentir.  El sintoma mas frecuente es un sentimiento de honda tristeza: tambien podria causane la sensacion de que ya no puede sobrellevar la babak.    Otros sintomas incluyen:      * Ganar o perder mucho peso  * Dormir en exceso o demasiado poco  * Estar cansada todo el tiempo  * Sentirse inquieta  * " Tener miedo de querer herir al renetta'  * No tener interes por el renetta'  * Sentirse inutil o culpable   * No encontrar placer en las cosas que solia gustarle hacer  * Dificultades para pensar con claridad o kilo decisiones  * Pensar sobre la muerte o el suicidio     Que' Causa la Depresion Postparto?    La causea exacta de la depresion postarto se desconce, aunque posiblemente es el resultado de los cambios hormonales que suceden beatrice y despues del parto.  Tambien puede deberse al cansancio que le causan las exigencias del renetta' y el proceso de adaptacion a portillo maternidad.  Todos estos factores podrian deprimirla.  En algunos casos, existe owen predisposicion genetica a myah tipo de depresion.    La depresion puede tratarse    Lo pozo es que hay muchas maneras de tratar la depresion postparo.  Emprenda el primer paso para sentirse mejor hablando con portillo medico.     Recursos    * National Institutes of Mental Health-- 897-357-2483     www.nimh.nih.gov    * National [place] on Mental Illness -- 936-228-1648     Www.yoni.org    * Mental Health M[place]a --  [phone]     Www.[facility].org    * National Suidide Hotline -- 299.807.7784    7112-2651 [employer]  Todos los derechos reservados.  Esta informacion no pretende sustituir las atencion medica profesional.  Solo portillo medico puede diagnosticar y tratar un problema de phil.           Instrucciones de la lactancia maternal para los bebes recién nacidos:    La Academia de Pediat[employer] Americana recomienda la leche maternal sathya la unica Sharmila de nutrición para portillo bebé beatrice los primeros 6 meses de la babak, y puede continuar, con la introducción de comida, hasta y despues de 1 ano de edad. Informado sobre chavez consejos: Hay muchos beneficios de amamantar para el phil de la madre y del bebé. Dayanara los chupones, teteras, o botellas por lo menos por las primeras 4 semanas. Usar los chupones, teteras, o botellas pueden interumpir el proceso de amamantar.     Alimenta en cuanto hay muestras de hambre:  o Annika en la boca, hacienda movimientos de succionar.  o Ruiditos suavecitos o estirarse  o Llorar es un signo de hambre tardío: no esperes a que el bebé llore.   Es de esperarse que haya 8-12 alimentaciones por 24 horas, aunque estas alimentaciones no sigan un horario definido.   Alterna el seno con el que empiezas, o empieza con el seno que se siente más lleno.   Cambia de lado cuando el bebé empiece a tragar más despacio o se desconecte del seno.   Esta scot si el bebé no come del rubén seno en cada alimentación.   Si el bebé esta muy dormido o somnoliente: el contacto de piel a piel puede animarlo a empezar a comer:  o Quítale la camiseta y acuéstalo sobre tu pecho desnudo   Duerme cerca de tu bebé, aun en la casa. Aprende a karmen pecho acostada.   En la posición correcta los dos estarán cómodos:  o barbilla con seno, pecho con pecho  o Labio del bebé abierto hacia afuera para tragar: el labio del bebé debe estar volteado hacia afuera  o Boca abierta scot jaja: la boca del bebé cubre la mayoría de la areola (el área oscura del seno)--no nada más el pezón   Fíjate en los signos de que hay transferencia de leche:  o Puedes oír al bebé tragar.  o No se oyen ruidos más o menos giovanny sathya clic.  o El bebé no demuestra que tiene más hambre después de la alimentación.  o El cuerpo del bebé y gabriel annika están relajados por un tiempo corto.   Lo que entra, tiene que salir. Está pendiente de:  o Al cuarto día, por lo menos 3 cacas al día.  o La curtis cambia de mary a macie o café y luego a amarillo más líquido cuando baja tu leche.  o Después del cuarto día, por lo menos 6 paòales mojados/pesados al día.  o La orina debe ser de color amarillo ana cuando baja tu leche.   Debes kilo agua cuando tienes sed y comer alimentos sanos cuando tiene hambre. Candida siesta para descansar lo suficiente.    No tomes medicamentos o alcohol sino con el consejo de portillo  doctor. Puedes llamar al [facility]): ([phone]), [place], 8am-5pm, para obtener información sobre los medicamentos y la leche maternal.   La cindy de seguimiento con la pediatra debe ser 2 días después de salir del hospital. El bebé deberia nury ganado el peso de nacimiento cuando tiene 10-14 días de babak.

## 2019-03-02 NOTE — DISCHARGE SUMMARY
Delivery Discharge Summary  Obstetrics      Primary OB Clinician: Blanco Kan MD    Admission date: 2019  Discharge date: 2019    Admit Dx:   Patient Active Problem List   Diagnosis    Pregnancy    39 weeks gestation of pregnancy    Hx of  section    Hx of preeclampsia, prior pregnancy, currently pregnant    Hx of acute pyelonephritis    Screening examination for sexually transmitted disease    Acute blood loss anemia     Discharge Dx:   Patient Active Problem List   Diagnosis    Pregnancy    39 weeks gestation of pregnancy    Hx of  section    Hx of preeclampsia, prior pregnancy, currently pregnant    Hx of acute pyelonephritis    Screening examination for sexually transmitted disease    Acute blood loss anemia       Procedure: , due to repeat     Hospital Course:  Pt is a 23 y.o. now  by , due to repeat , POD # 2 was admitted on 2019. On initial assessment, vital signs were stable and physical exam was Normal.  Patient was subsequently admitted to labor and delivery unit with signed consents.  Patient subsequently delivered a viable infant, please see delivery information below or full delivery note for further details. Pt was in stable condition post delivery and was transferred to the Mother-Baby Unit in a stable condition. Her postpartum course was uncomplicated. On discharge day, patient's pain is well  controlled with oral pain medications. Pt is tolerating ambulation without SOB or CP, and PO diet without N/V. Reports lochia is mild in degree. Denies any HA, vision changes, F/C, LE swelling. Denies any breast pain/soreness.  Pt in stable condition and ready for discharge, instructed to continue PNV, Iron supplement, and to follow up in the OB clinic in 4-6 weeks with Dr. Fajardo.    Discharge exam:  Gen: NAD  HEENT: NC, AT  Chest: CTABL  CVS: RRR, no m/r/g  Abdomen: soft, mildly tender, ND, no masses, +BS  Incision:  clean, dry, intact  Ext: no edema, pulses 2+   Skin: ro rashes  Neuro: A&O x 3, CN's grossly intact, sensation intact to light touch  Psych: Normal mood, affect, thought content    Delivery:    Episiotomy: None   Lacerations: None   Repair suture:     Repair # of packets:     Blood loss (ml): 0     Birth information:  YOB: 2019   Time of birth: 8:45 AM   Sex: male   Delivery type: , Classical   Gestational Age: 39w0d    Delivery Clinician:      Other providers:       Additional  information:  Forceps:    Vacuum:    Breech:    Observed anomalies      Living?:           APGARS  One minute Five minutes Ten minutes   Skin color:         Heart rate:         Grimace:         Muscle tone:         Breathing:         Totals: 9  9        Placenta: Delivered:       appearance      Patient Instructions:   Current Discharge Medication List      START taking these medications    Details   ferrous sulfate 325 (65 FE) MG EC tablet Take 1 tablet (325 mg total) by mouth 3 (three) times daily with meals.  Refills: 0      naproxen (NAPROSYN) 500 MG tablet Take 1 tablet (500 mg total) by mouth every 8 (eight) hours as needed (cramping or mild pain 1-3/10 scale).  Qty: 30 tablet, Refills: 0      oxyCODONE-acetaminophen (PERCOCET) 5-325 mg per tablet Take 1 tablet by mouth every 4 (four) hours as needed.  Qty: 20 tablet, Refills: 0         CONTINUE these medications which have NOT CHANGED    Details   aspirin (ECOTRIN) 81 MG EC tablet Take 1 tablet (81 mg total) by mouth once daily.  Qty: 150 tablet, Refills: 2      levonorgestrel (MIRENA) 20 mcg/24 hr (5 years) IUD 1 Intra Uterine Device by Intrauterine route once. for 1 dose  Qty: 1 Intra Uterine Device, Refills: 0    Associated Diagnoses: IUD contraception      PRENATAL 19 29 mg iron- 1 mg Chew CHEW 1 T PO  ONCE DAILY  Refills: 3           < 30 mins spent on discharge summary      No discharge procedures on file.    3/2/2019 Jerad Kirkpatrick M.D.  [facility]  Resident PGY3

## 2019-03-02 NOTE — LACTATION NOTE
Mom stated that she BR well last @ 1130. Denies any needs/problems with regard to BR. Mom laying down. C/o abd pain & HA. Appears to not feel well. Informed Henrietta Cardoso at bs translating.

## 2019-03-02 NOTE — PLAN OF CARE
Problem: Adult Inpatient Plan of Care  Goal: Plan of Care Review  Outcome: Outcome(s) achieved Date Met: 03/02/19  Mom will continue to breastfeed frequently & on cue at least 8+ times/24 hrs.  Will monitor for signs of adequate fdg. Has bottle of formula at bs. Discouraged formula. Discussed benefits of BR/risks of FF; supply/demand. Will have baby's weight checked at ped's office in the next couple of days.  Will call for any needs.

## 2019-03-02 NOTE — PLAN OF CARE
Discharge instructions given verbally and in writing.  Verbalized understanding.  Received Mother-Baby care guide during hospital stay.  Prescriptions given with explanation for use.  Verbalized understanding.  CDC vaccine information statement given and risk/benifits of vaccine discussed.  Tdap not given per pt. request.  States she feels comfortable taking care of baby and has demonstrated ability to care for  and herself.  Says she will have assistance when she returns home.

## 2019-03-02 NOTE — LACTATION NOTE
[facility]  Lactation Note - Mom    SUMMARY     Maternal Assessment    Breast Shape: Bilateral:, wide  Breast Density: Bilateral:, filling  Areola: Bilateral:, elastic  Nipples: Bilateral:, everted  Left Nipple Symptoms: redness, tender  Right Nipple Symptoms: redness, tender  Preferred Pain Scale: number (Numeric Rating Pain Scale)  Comfort/Acceptable Pain Level: 3  Pain Body Location - Orientation: lower  Pain Body Location: abdomen  Pain Rating (0-10): Rest: 5  Pain Rating (0-10): Activity: 5  Pain Rating: Rest: 0 - no pain  Pain Rating: Activity: 6 - moderate-severe pain  Pain Radiation to: back  Frequency: occasional  Quality: aching  Nonverbal Indicators of Pain: restless, muscle tension  Pain Management Interventions: quiet environment facilitated, care clustered  Sleep/Rest/Relaxation: appears asleep, no problem identified  POSS (Pasero Opioid-Induced Sed Scale): 1 - Awake and alert  Fever Reduction/Comfort Measures: medication administered    LATCH Score         Breasts WDL    Breast WDL: WDL except  Left Nipple Symptoms: redness, tender  Right Nipple Symptoms: redness, tender    Maternal Infant Feeding    Maternal Preparation: breast care, hand hygiene  Maternal Emotional State: relaxed, independent  Infant Positioning: cradle  Signs of Milk Transfer: infant jaw motion present  Pain with Feeding: yes(6/10 per mom)  Pain Location: nipples, bilateral  Pain Description: soreness  Comfort Measures Before/During Feeding: infant position adjusted, latch adjusted  Comfort Measures Following Feeding: expressed milk applied, other (see comments)(lanolin provided)  Latch Assistance: no    Lactation Referrals    Lactation Referrals: pediatric care provider  Pediatric Care Provider Lactation Follow-up Date/Time: within 2-3 days;has appt 3/7/19 per mom;enc to go sooner if any problems    Lactation Interventions    Breast Care: Breastfeeding: breast milk to nipples, lanolin to nipples  Breastfeeding  Assistance: feeding cue recognition promoted, feeding on demand promoted, feeding session observed, infant latch-on verified, infant suck/swallow verified, support offered  Breast Care: Breastfeeding: breast milk to nipples, lanolin to nipples  Breastfeeding Assistance: feeding cue recognition promoted, feeding on demand promoted, feeding session observed, infant latch-on verified, infant suck/swallow verified, support offered  Breastfeeding Support: encouragement provided, lactation counseling provided, maternal hydration promoted, maternal nutrition promoted, maternal rest encouraged       Breastfeeding Session    Infant Positioning: cradle  Signs of Milk Transfer: infant jaw motion present    Maternal Information    Infant Reason for Referral:  infant

## 2019-03-04 NOTE — ANESTHESIA POSTPROCEDURE EVALUATION
"Anesthesia Post Evaluation    Patient: Radha Akbar    Procedure(s) Performed: Procedure(s) (LRB):   SECTION (N/A)    Final Anesthesia Type: spinal  Patient location during evaluation: labor & delivery  Patient participation: Yes- Able to Participate  Level of consciousness: awake and alert  Post-procedure vital signs: reviewed and stable  Pain management: adequate  Airway patency: patent  PONV status at discharge: No PONV  Anesthetic complications: no      Cardiovascular status: blood pressure returned to baseline and hemodynamically stable  Respiratory status: unassisted  Hydration status: euvolemic  Follow-up not needed.        Visit Vitals  /65 (BP Location: Right arm, Patient Position: Sitting)   Pulse 98   Temp 36.7 °C (98 °F) (Oral)   Resp 18   Ht 5' 2" (1.575 m)   Wt 81.6 kg (180 lb)   LMP 2018 (Approximate)   SpO2 98%   Breastfeeding? Yes   BMI 32.92 kg/m²       Pain/Fabian Score: No Data Recorded      "

## 2019-03-06 ENCOUNTER — TELEPHONE (OUTPATIENT)
Dept: OBSTETRICS AND GYNECOLOGY | Facility: CLINIC | Age: 24
End: 2019-03-06

## 2019-03-06 NOTE — TELEPHONE ENCOUNTER
Called patient to schedule staple remove-pp alicia for 03/11/19 @2:45pm. Patient agreed and verbalized understanding.

## 2019-03-06 NOTE — TELEPHONE ENCOUNTER
----- Message from Shweta Villeda sent at 3/6/2019  7:39 AM CST -----  Contact: Self [phone]  Patient had a  and needs to be seen tomorrow to get her staples removed. Please advice

## 2019-03-11 ENCOUNTER — TELEPHONE (OUTPATIENT)
Dept: OBSTETRICS AND GYNECOLOGY | Facility: CLINIC | Age: 24
End: 2019-03-11

## 2019-03-11 NOTE — TELEPHONE ENCOUNTER
Patient wanted to reschedule post op appointment since she couldn't make it today. I advised patient that she would have to see another doctor since Dr. Fajardo has no more availabilities and he will be out of the office starting Wednesday. Patient verbalized understanding and is scheduled to see Dr. Schmitz on Wednesday for 10:00am. Patient verbalized understanding.

## 2019-03-11 NOTE — TELEPHONE ENCOUNTER
----- Message from Maritza Willis sent at 3/11/2019  3:24 PM CDT -----  Contact: Self [phone]  Patient would like to speak with you about rescheduling her appointment. Please advise

## 2019-03-13 ENCOUNTER — POSTPARTUM VISIT (OUTPATIENT)
Dept: OBSTETRICS AND GYNECOLOGY | Facility: CLINIC | Age: 24
End: 2019-03-13
Payer: MEDICAID

## 2019-03-13 VITALS
HEIGHT: 62 IN | BODY MASS INDEX: 28.44 KG/M2 | DIASTOLIC BLOOD PRESSURE: 72 MMHG | WEIGHT: 154.56 LBS | SYSTOLIC BLOOD PRESSURE: 120 MMHG

## 2019-03-13 DIAGNOSIS — Z48.02 ENCOUNTER FOR STAPLE REMOVAL: Primary | ICD-10-CM

## 2019-03-13 DIAGNOSIS — Z98.891 S/P CESAREAN SECTION: ICD-10-CM

## 2019-03-13 PROCEDURE — 99213 OFFICE O/P EST LOW 20 MIN: CPT | Mod: PBBFAC,PO,25 | Performed by: OBSTETRICS & GYNECOLOGY

## 2019-03-13 PROCEDURE — 99999 PR PBB SHADOW E&M-EST. PATIENT-LVL III: ICD-10-PCS | Mod: PBBFAC,,, | Performed by: OBSTETRICS & GYNECOLOGY

## 2019-03-13 PROCEDURE — 99999 PR PBB SHADOW E&M-EST. PATIENT-LVL III: CPT | Mod: PBBFAC,,, | Performed by: OBSTETRICS & GYNECOLOGY

## 2019-03-13 PROCEDURE — 59430 PR CARE AFTER DELIVERY ONLY: ICD-10-PCS | Mod: S$PBB,,, | Performed by: OBSTETRICS & GYNECOLOGY

## 2019-03-13 NOTE — PROGRESS NOTES
Subjective:       Patient ID: Radha Akbar is a 23 y.o. female.    Chief Complaint:  Postpartum Care (staple removal)      History of Present Illness  22yo  s/p RCD on 19, POD 13, doing well.  Presents for staple removal.  Breastfeeding.  Lochia minimal.  Pain controlled with medication.  Desires Nexplanon for contraception.    GYN & OB History  Patient's last menstrual period was 2018 (approximate).   Date of Last Pap: 2018    OB History    Para Term  AB Living   3 2 2 0 1 2   SAB TAB Ectopic Multiple Live Births   1 0 0 0 2      # Outcome Date GA Lbr Oleksandr/2nd Weight Sex Delivery Anes PTL Lv   3 Term 19 39w0d  4.216 kg (9 lb 4.7 oz) M CS-Classical Spinal N ALMA   2 Term 11/10/11 40w0d   M CS-Unspec   ALMA   1 SAB                   Review of Systems  Review of Systems: Neg x 10 except as per HPI        Objective:    Physical Exam       Vitals:    19 1022   BP: 120/72     Gen: NAD  Resp: Normal respiratory effort  Abd: soft, NT.  Vertical incision healing well.  Staples removed, no breakdown noted.  No erythema/exudate.  Steristrips placed.  Pelvic: deferred  Ext: normal ROM  Psych: appropriate affect  Neuro: grossly intact    Assessment:        1. Encounter for staple removal    2. S/P  section              Plan:      Staples removed today without difficulty.  Vertical incision healing well.  Desires Nexplanon for contraception - not arrived in office yet.  Precautions given, counseling done.  RTC 4-5 wks for postpartum visit with Dr. Fajardo.

## 2019-03-22 ENCOUNTER — TELEPHONE (OUTPATIENT)
Dept: PHARMACY | Facility: CLINIC | Age: 24
End: 2019-03-22

## 2019-04-15 ENCOUNTER — POSTPARTUM VISIT (OUTPATIENT)
Dept: OBSTETRICS AND GYNECOLOGY | Facility: CLINIC | Age: 24
End: 2019-04-15
Payer: MEDICAID

## 2019-04-15 VITALS
BODY MASS INDEX: 27.54 KG/M2 | SYSTOLIC BLOOD PRESSURE: 102 MMHG | DIASTOLIC BLOOD PRESSURE: 76 MMHG | WEIGHT: 150.56 LBS

## 2019-04-15 PROCEDURE — 99999 PR PBB SHADOW E&M-EST. PATIENT-LVL II: ICD-10-PCS | Mod: PBBFAC,,, | Performed by: OBSTETRICS & GYNECOLOGY

## 2019-04-15 PROCEDURE — 99999 PR PBB SHADOW E&M-EST. PATIENT-LVL II: CPT | Mod: PBBFAC,,, | Performed by: OBSTETRICS & GYNECOLOGY

## 2019-04-15 PROCEDURE — 0503F PR POSTPARTUM CARE VISIT: ICD-10-PCS | Mod: S$PBB,,, | Performed by: OBSTETRICS & GYNECOLOGY

## 2019-04-15 PROCEDURE — 99212 OFFICE O/P EST SF 10 MIN: CPT | Mod: PBBFAC,PO | Performed by: OBSTETRICS & GYNECOLOGY

## 2019-04-15 PROCEDURE — 0503F POSTPARTUM CARE VISIT: CPT | Mod: S$PBB,,, | Performed by: OBSTETRICS & GYNECOLOGY

## 2019-04-15 NOTE — PROGRESS NOTES
CC: Post-partum follow-up    Radha Akbar is a 23 y.o. female  who presents for post-partum visit.and IUD insertion      She is S/P a , due to repeat.  She and the baby are doing well.  No pain.  No fever.   No bowel / bladder complaints.    Delivery Date: 2019  Delivery MD: Scotty   Breast Feeding: YES  Depression: NO  Contraception: IUD    Pregnancy was complicated by:  HX C/S x1       /76   Wt 68.3 kg (150 lb 9.2 oz)   Breastfeeding? Yes   BMI 27.54 kg/m²     ROS:  GENERAL: No fever, chills, fatigability.  VULVAR: No pain, no lesions and no itching.  VAGINAL: No relaxation, no itching, no discharge, no abnormal bleeding and no lesions.  ABDOMEN: No abdominal pain. Denies nausea. Denies vomiting. No diarrhea. No constipation  BREAST: Denies pain. No lumps. No discharge.  URINARY: No incontinence, no nocturia, no frequency and no dysuria.  CARDIOVASCULAR: No chest pain. No shortness of breath. No leg cramps.  NEUROLOGICAL: No headaches. No vision changes.    PHYSICAL EXAM:  ABDOMEN:  Soft, non-tender, non-distended  Patient declines examination today       IMP:  Patient also declines IUD insertion     Contraception counseling given         PLAN:  May resume normal activities  Return: fr annual visit or PRN       Ascencion Fajardo M.D.   OB/GYN

## 2019-09-04 ENCOUNTER — HOSPITAL ENCOUNTER (EMERGENCY)
Facility: HOSPITAL | Age: 24
Discharge: HOME | End: 2019-09-04
Attending: EMERGENCY MEDICINE
Payer: MEDICAID

## 2019-09-04 VITALS
TEMPERATURE: 99 F | RESPIRATION RATE: 16 BRPM | DIASTOLIC BLOOD PRESSURE: 75 MMHG | BODY MASS INDEX: 29.63 KG/M2 | SYSTOLIC BLOOD PRESSURE: 109 MMHG | OXYGEN SATURATION: 99 % | WEIGHT: 162 LBS | HEART RATE: 88 BPM

## 2019-09-04 DIAGNOSIS — Z3A.01 LESS THAN 8 WEEKS GESTATION OF PREGNANCY: Primary | ICD-10-CM

## 2019-09-04 DIAGNOSIS — O20.0 THREATENED ABORTION: ICD-10-CM

## 2019-09-04 DIAGNOSIS — R10.9 ABDOMINAL PAIN, UNSPECIFIED ABDOMINAL LOCATION: ICD-10-CM

## 2019-09-04 DIAGNOSIS — R11.0 NAUSEA: ICD-10-CM

## 2019-09-04 LAB
ABO + RH BLD: NORMAL
ALBUMIN SERPL BCP-MCNC: 4 G/DL (ref 3.5–5.2)
ALP SERPL-CCNC: 98 U/L (ref 55–135)
ALT SERPL W/O P-5'-P-CCNC: 6 U/L (ref 10–44)
ANION GAP SERPL CALC-SCNC: 7 MMOL/L (ref 8–16)
AST SERPL-CCNC: 16 U/L (ref 10–40)
B-HCG UR QL: POSITIVE
BACTERIA GENITAL QL WET PREP: ABNORMAL
BASOPHILS # BLD AUTO: 0.02 K/UL (ref 0–0.2)
BASOPHILS NFR BLD: 0.2 % (ref 0–1.9)
BILIRUB SERPL-MCNC: 0.6 MG/DL (ref 0.1–1)
BILIRUB UR QL STRIP: NEGATIVE
BUN SERPL-MCNC: 15 MG/DL (ref 6–20)
CALCIUM SERPL-MCNC: 8.9 MG/DL (ref 8.7–10.5)
CHLORIDE SERPL-SCNC: 110 MMOL/L (ref 95–110)
CLARITY UR: CLEAR
CLUE CELLS VAG QL WET PREP: ABNORMAL
CO2 SERPL-SCNC: 23 MMOL/L (ref 23–29)
COLOR UR: YELLOW
CREAT SERPL-MCNC: 0.7 MG/DL (ref 0.5–1.4)
CTP QC/QA: YES
DIFFERENTIAL METHOD: ABNORMAL
EOSINOPHIL # BLD AUTO: 0.2 K/UL (ref 0–0.5)
EOSINOPHIL NFR BLD: 1.4 % (ref 0–8)
ERYTHROCYTE [DISTWIDTH] IN BLOOD BY AUTOMATED COUNT: 15.6 % (ref 11.5–14.5)
EST. GFR  (AFRICAN AMERICAN): >60 ML/MIN/1.73 M^2
EST. GFR  (NON AFRICAN AMERICAN): >60 ML/MIN/1.73 M^2
FILAMENT FUNGI VAG WET PREP-#/AREA: ABNORMAL
GLUCOSE SERPL-MCNC: 101 MG/DL (ref 70–110)
GLUCOSE UR QL STRIP: NEGATIVE
HCG INTACT+B SERPL-ACNC: 216 MIU/ML
HCT VFR BLD AUTO: 32.2 % (ref 37–48.5)
HGB BLD-MCNC: 10.2 G/DL (ref 12–16)
HGB UR QL STRIP: NEGATIVE
KETONES UR QL STRIP: NEGATIVE
LEUKOCYTE ESTERASE UR QL STRIP: NEGATIVE
LYMPHOCYTES # BLD AUTO: 4.7 K/UL (ref 1–4.8)
LYMPHOCYTES NFR BLD: 44.9 % (ref 18–48)
MCH RBC QN AUTO: 25.7 PG (ref 27–31)
MCHC RBC AUTO-ENTMCNC: 31.7 G/DL (ref 32–36)
MCV RBC AUTO: 81 FL (ref 82–98)
MONOCYTES # BLD AUTO: 0.7 K/UL (ref 0.3–1)
MONOCYTES NFR BLD: 7 % (ref 4–15)
NEUTROPHILS # BLD AUTO: 4.8 K/UL (ref 1.8–7.7)
NEUTROPHILS NFR BLD: 46.5 % (ref 38–73)
NITRITE UR QL STRIP: NEGATIVE
PH UR STRIP: 7 [PH] (ref 5–8)
PLATELET # BLD AUTO: 427 K/UL (ref 150–350)
PMV BLD AUTO: 9.7 FL (ref 9.2–12.9)
POTASSIUM SERPL-SCNC: 3.8 MMOL/L (ref 3.5–5.1)
PROT SERPL-MCNC: 7.4 G/DL (ref 6–8.4)
PROT UR QL STRIP: NEGATIVE
RBC # BLD AUTO: 3.97 M/UL (ref 4–5.4)
SODIUM SERPL-SCNC: 140 MMOL/L (ref 136–145)
SP GR UR STRIP: 1.03 (ref 1–1.03)
SPECIMEN SOURCE: ABNORMAL
T VAGINALIS GENITAL QL WET PREP: ABNORMAL
URN SPEC COLLECT METH UR: NORMAL
UROBILINOGEN UR STRIP-ACNC: NEGATIVE EU/DL
WBC # BLD AUTO: 10.43 K/UL (ref 3.9–12.7)
WBC #/AREA VAG WET PREP: ABNORMAL
YEAST GENITAL QL WET PREP: ABNORMAL

## 2019-09-04 PROCEDURE — 96361 HYDRATE IV INFUSION ADD-ON: CPT

## 2019-09-04 PROCEDURE — 96360 HYDRATION IV INFUSION INIT: CPT

## 2019-09-04 PROCEDURE — 99284 EMERGENCY DEPT VISIT MOD MDM: CPT | Mod: 25

## 2019-09-04 PROCEDURE — 87210 SMEAR WET MOUNT SALINE/INK: CPT

## 2019-09-04 PROCEDURE — 63600175 PHARM REV CODE 636 W HCPCS: Performed by: PHYSICIAN ASSISTANT

## 2019-09-04 PROCEDURE — 86901 BLOOD TYPING SEROLOGIC RH(D): CPT

## 2019-09-04 PROCEDURE — 84702 CHORIONIC GONADOTROPIN TEST: CPT

## 2019-09-04 PROCEDURE — 81025 URINE PREGNANCY TEST: CPT | Performed by: PHYSICIAN ASSISTANT

## 2019-09-04 PROCEDURE — 85025 COMPLETE CBC W/AUTO DIFF WBC: CPT

## 2019-09-04 PROCEDURE — 81003 URINALYSIS AUTO W/O SCOPE: CPT

## 2019-09-04 PROCEDURE — 80053 COMPREHEN METABOLIC PANEL: CPT

## 2019-09-04 RX ORDER — ACETAMINOPHEN 500 MG
500 TABLET ORAL EVERY 6 HOURS PRN
Qty: 20 TABLET | Refills: 0 | Status: SHIPPED | OUTPATIENT
Start: 2019-09-04 | End: 2019-09-07

## 2019-09-04 RX ORDER — ONDANSETRON 4 MG/1
4 TABLET, ORALLY DISINTEGRATING ORAL EVERY 6 HOURS PRN
Qty: 20 TABLET | Refills: 0 | Status: SHIPPED | OUTPATIENT
Start: 2019-09-04 | End: 2019-09-08

## 2019-09-04 RX ADMIN — SODIUM CHLORIDE 1000 ML: 0.9 INJECTION, SOLUTION INTRAVENOUS at 09:09

## 2019-09-05 ENCOUNTER — INITIAL PRENATAL (OUTPATIENT)
Dept: OBSTETRICS AND GYNECOLOGY | Facility: CLINIC | Age: 24
End: 2019-09-05
Payer: MEDICAID

## 2019-09-05 VITALS
SYSTOLIC BLOOD PRESSURE: 114 MMHG | WEIGHT: 159.5 LBS | HEART RATE: 70 BPM | DIASTOLIC BLOOD PRESSURE: 72 MMHG | BODY MASS INDEX: 29.17 KG/M2

## 2019-09-05 DIAGNOSIS — Z34.90 EARLY STAGE OF PREGNANCY: Primary | ICD-10-CM

## 2019-09-05 PROCEDURE — 99999 PR PBB SHADOW E&M-EST. PATIENT-LVL II: CPT | Mod: PBBFAC,,, | Performed by: OBSTETRICS & GYNECOLOGY

## 2019-09-05 PROCEDURE — 99999 PR PBB SHADOW E&M-EST. PATIENT-LVL II: ICD-10-PCS | Mod: PBBFAC,,, | Performed by: OBSTETRICS & GYNECOLOGY

## 2019-09-05 PROCEDURE — 99204 OFFICE O/P NEW MOD 45 MIN: CPT | Mod: TH,S$PBB,, | Performed by: OBSTETRICS & GYNECOLOGY

## 2019-09-05 PROCEDURE — 99212 OFFICE O/P EST SF 10 MIN: CPT | Mod: PBBFAC,TH | Performed by: OBSTETRICS & GYNECOLOGY

## 2019-09-05 PROCEDURE — 99204 PR OFFICE/OUTPT VISIT, NEW, LEVL IV, 45-59 MIN: ICD-10-PCS | Mod: TH,S$PBB,, | Performed by: OBSTETRICS & GYNECOLOGY

## 2019-09-05 NOTE — DISCHARGE INSTRUCTIONS
You beta HCG is 216 and your abdominal US showed a gestational sac  with gestational age of 4 week and 6 fays. No fetal pole or yolk sac detected. You should have your HCG test and US repeated in 1 week. You should follow up with OB within a week. Return to the ER if your symptoms worsen or if any other concerning symptoms develop.

## 2019-09-05 NOTE — PROGRESS NOTES
[facility]  Ambulatory Clinic  Obstetrics & Gynecology    Visit Date:  2019     Chief Complaint:  Establish prenatal care    Dating Criteria:     LMP:  2019   Working MARKOS:  2020 by LMP    History of Present Illness:      Radha Akbar is a 23 y.o.  at 5w3d by LMP here to establish prenatal care.     Pt has no major complaints today.  Pt denies any vaginal bleeding, leakage of fluid, contractions, or pain.     Otherwise, pt is in her usual state of health.    Pt sister present for visit.    Past Medical History:      None    Past Surgical History:      x 2     Medications:     Prenatal vitamins    Allergies:     NKDA      Obstetric History:       Para Term  AB Living   4 2 2 0 1 2   SAB TAB Ectopic Multiple Live Births   1 0 0 0 2      # Outcome Date GA Lbr Oleksandr/2nd Weight Sex Delivery Anes PTL Lv   4 Current            3 Term 19 39w0d  4.216 kg (9 lb 4.7 oz) M CS-LTranv Spinal N ALMA   2 Term 11/10/11 40w0d   M CS-Unspec   ALMA   1 SAB              Gynecologic History:      Denies recent/active STI  Denies history abnormal Pap     Social History:      Denies tobacco, alcohol or illicit drug use  Current partner is father of baby  Denies domestic abuse     Family History:       Denies congenital anomalies, inherited syndromes, fetal aneuploidy    Review of Systems:      Constitutional:  No fever, fatigue  HENT:  No congestion, hearing changes  Eyes:  No visual disturbance  Respiratory:  No cough, shortness of breath  Cardiovascular:  No chest pain, leg swelling  Breast:  No lump, pain, nipple discharge, redness, skin changes  Gastrointestinal:  No abdominal pain, constipation, blood in stool   Genitourinary:  No dysuria, frequency  Endocrine:  No heat or cold intolerance  Musculoskeletal:  No back pain, arthralgias  Skin:  No rash, jaundice  Neurological:  No dizziness, weakness, headaches  Psychiatric/Behavioral:  No sleep disturbance,  dysphoric mood     Physical Exam:     /72   Pulse 70   Wt 72.3 kg (159 lb 8 oz)   LMP 2019 (Exact Date)   BMI 29.17 kg/m²      GENERAL:  NAD. Well-nourished. A&Ox3.  HEENT:  NCAT, EOMI, moist mucus membranes.  Neck supple w/o masses.  BREAST:  Symmetric, no obvious masses, adenopathy, skin changes or nipple discharge.  LUNGS:  CTA-B.  HEART:  RRR, physiologic heart sounds.  ABDOMEN:  Soft, non-tender. Normoactive BS.  No obvious organomegaly.    EXT:  Symmetric w/o cramping, claudication, or edema. +2 distal pulses. FROM.  SKIN:  No rashes or bruising.  NEURO:  Grossly intact bilaterally. +2 DTR.  PSYCH:  Mood & affect appropriate.       PELVIC:  Female external genitalia w/o any obvious lesions.  Adequate perineal body. Normal urethral meatus. No gross lymphadenopathy.     VAGINA:  Pink, moist, well-rugated.  Good support.  No obvious lesion.  No discharge noted.     CERVIX:  No cervical motion tenderness, discharge, or obvious lesions.  Closed, thick, posterior.  UTERUS:  Small, non-tender, normal contour.  ADNEXA:  No masses or tenderness.    RECTAL:  Declined.  No obvious external lesions.     Chaperone present for exam.    Assessment:     1. 23 y.o.  at 5w3d by LMP here to establish prenatal care  2. H/o  x 2    Plan:    Principles of prenatal care, weight gain goals, dietary/lifestyle modifications, pregnancy care instructions and precautions were discussed in detail.  Pt was provided literature regarding pregnancy, maternity care, and childbirth.  Continue prenatal vitamins.  SAB/ectopic precautions reviewed.    Initial OB labs today.    We discussed first trimester aneuploidy screening options, pt declined and states she would not terminate the pregnancy for any reasons.    Discussed route of delivery, pt declined TOLAC.    Return in 4 weeks, or sooner as needed.  All questions answered, pt voiced understanding.    Australian nurse/pt sister present for visit, salvatore Steele   services.      Harpreet Melgoza MD

## 2019-09-05 NOTE — ED PROVIDER NOTES
Encounter Date: 2019  SORT:   24 y/o female presenting for evaluation of intermittent lower abdominal cramping and lumbar back pain x 3 days and bleeding today. LMP 19. Initial orders placed. KATHE Mack PA-C   SCRIBE #1 NOTE: IErnie, am scribing for, and in the presence of,  Stacia Hutchinson PA-C. I have scribed the following portions of the note - Other sections scribed: HPI, ROS, and PE.       History     Chief Complaint   Patient presents with    Abdominal Cramping     approx 1 month pregnant. Pt is reporting abd pain and nv x3 days     CC: Abdominal Pain    HPI:  This is a 23 y.o. A0 female, with a PMHx of depression who presents to the Emergency Department with c/o abdominal pain. Per pt, she has constant pain lower abdominal pain for the past 3 days. She describes her pain as a localized squeezing over her suprapubic region, worse with movement and better with rest. She has associated nausea and 2 episodes of emesis over the past few days. She has one episode of light vaginal bleeding yesterday, but has not experienced any today. LMP 2019. Denies dysuria, urinary frequency, hematuria, fevers, chills, chest pain, SOB, back/neck pain, diarrhea, blood in stool, melena, headache, numbness, weakness, or any other medical complaints.     The history is provided by the patient. The history is limited by a language barrier (Primarily Ethiopian speaking, used [employer] for translation). A  was used (#95472).     Review of patient's allergies indicates:  No Known Allergies  Past Medical History:   Diagnosis Date    Depression      Past Surgical History:   Procedure Laterality Date     SECTION       SECTION N/A 2019    Performed by Ascencion Fajardo MD at [facility] L[facility]D     Family History   Problem Relation Age of Onset    Diabetes Mother      Social History     Tobacco Use    Smoking status: Never Smoker    Smokeless tobacco: Never Used   Substance Use  Topics    Alcohol use: No     Frequency: Never    Drug use: No     Review of Systems   Constitutional: Negative for chills, fatigue and fever.   HENT: Negative for congestion and sore throat.    Eyes: Negative for visual disturbance.   Respiratory: Negative for cough and shortness of breath.    Cardiovascular: Negative for chest pain.   Gastrointestinal: Positive for nausea and vomiting. Negative for abdominal pain and diarrhea.   Genitourinary: Negative for difficulty urinating, dysuria, flank pain, frequency and hematuria.   Musculoskeletal: Negative for back pain and neck pain.   Skin: Negative for rash.   Neurological: Negative for dizziness, syncope, weakness, light-headedness, numbness and headaches.   Psychiatric/Behavioral: Negative for confusion.       Physical Exam     Initial Vitals [09/04/19 1946]   BP Pulse Resp Temp SpO2   113/72 99 16 99.1 °F (37.3 °C) 98 %      MAP       --         Physical Exam    Nursing note and vitals reviewed.  Constitutional: She appears well-developed and well-nourished. She is not diaphoretic. No distress.   HENT:   Head: Normocephalic and atraumatic.   Mouth/Throat: Oropharynx is clear and moist. No oropharyngeal exudate.   Eyes: Conjunctivae and EOM are normal. Pupils are equal, round, and reactive to light. No scleral icterus.   Neck: Normal range of motion. Neck supple.   Cardiovascular: Normal rate, regular rhythm, normal heart sounds and intact distal pulses. Exam reveals no gallop and no friction rub.    No murmur heard.  Pulmonary/Chest: Breath sounds normal. No respiratory distress. She has no wheezes. She has no rhonchi. She has no rales.   Abdominal: Soft. Bowel sounds are normal. There is tenderness in the right lower quadrant and left lower quadrant. There is no rebound and no guarding.   Suprapubic TTP, no rebound or guarding. No CVA tenderness.    Genitourinary: Rectum normal, vagina normal and uterus normal. Cervix exhibits no motion tenderness, no discharge  and no friability. Right adnexum displays no tenderness and no fullness. Left adnexum displays no tenderness and no fullness.   Musculoskeletal: Normal range of motion. She exhibits no edema or tenderness.        Lumbar back: Tenderness: paraspinal    No midline C, T, or L spinal tenderness to palpation. No LE edema.    Neurological: She is alert and oriented to person, place, and time. She has normal strength. GCS score is 15. GCS eye subscore is 4. GCS verbal subscore is 5. GCS motor subscore is 6.   Skin: Skin is warm and dry. Capillary refill takes less than 2 seconds.   Psychiatric: She has a normal mood and affect.         ED Course   Procedures  Labs Reviewed   CBC W/ AUTO DIFFERENTIAL - Abnormal; Notable for the following components:       Result Value    RBC 3.97 (*)     Hemoglobin 10.2 (*)     Hematocrit 32.2 (*)     Mean Corpuscular Volume 81 (*)     Mean Corpuscular Hemoglobin 25.7 (*)     Mean Corpuscular Hemoglobin Conc 31.7 (*)     RDW 15.6 (*)     Platelets 427 (*)     All other components within normal limits   COMPREHENSIVE METABOLIC PANEL - Abnormal; Notable for the following components:    ALT 6 (*)     Anion Gap 7 (*)     All other components within normal limits   VAGINAL SCREEN - Abnormal; Notable for the following components:    Bacteria - Vaginal Screen Rare (*)     All other components within normal limits   POCT URINE PREGNANCY - Abnormal; Notable for the following components:    POC Preg Test, Ur Positive (*)     All other components within normal limits   HCG, QUANTITATIVE, PREGNANCY   URINALYSIS, REFLEX TO URINE CULTURE    Narrative:     Preferred Collection Type->Urine, Clean Catch   GROUP & RH          Imaging Results           US OB Less Than 14 Wks with Transvag(xpd (Final result)  Result time 09/04/19 22:09:10    Final result by Breanna Kim MD (09/04/19 22:09:10)                 Impression:      Very early pregnancy.  Gestational sac with mean sac diameter corresponding to  a gestational age of 4 weeks 6 days with an estimated due date of 2020.  No fetal pole or yolk sac detected.    Perigestational hemorrhage.    This report was flagged in Epic as abnormal.      Electronically signed by: Breanna Kim  Date:    2019  Time:    22:09             Narrative:    EXAMINATION:  ULTRASOUND OBSTETRICAL ULTRASOUND LESS THAN 14 WEEKS WITH TRANSVAGINAL    CLINICAL HISTORY:  Vag Bleeding;    TECHNIQUE:  Real-time ultrasound obstetrical ultrasound less than 14 weeks was performed transabdominally and  transvaginally.    COMPARISON:  None.    FINDINGS:  The uterus measures 7.3 x 5.8 x 6.8 cm. There are no uterine masses.  A presumed gestational sac is visualized measuring 3.5 mm which corresponds to an ultrasound age of 4 weeks 6 days.  There is a hypoechoic area adjacent to the gestational sac measuring 6 x 4 x 3 mm, which is likely a perigestational bleed.    The right ovary measures 3.2 x 1.9 x 2.6 cm. The left ovary measures 3.7 x 1.8 x 3.6 cm.  There is 2 cystic areas in the left ovary.  One measures 1.9 x 1.6 x 1.7 cm.  The other measures 2.7 x 1.2 x 1.7 cm.  Arterial and venous flow is seen in both ovaries.    There is small free fluid in the cul-de-sac.                                 Medical Decision Making:   Initial Assessment:   23 y.o. A0 female, with a PMHx of depression who presents to the Emergency Department with c/o abdominal pain x 3 days with associated n/v. Vital signs are stable. Afebrile. RRR. Lungs CTA bilaterally. Suprapubic TTP. No CMT or adnexal tenderness.   Differential Diagnosis:   DDX includes but is not limited to pregnancy, threatened , ectopic pregnancy, UTI, BV, electrolyte abnormality, anemia.   Clinical Tests:   Lab Tests: Ordered and Reviewed  Radiological Study: Ordered and Reviewed  ED Management:  UPT positive. Will check basic labs, UA, wet prep, Rh factor, and abdominal US. Will give 1 L IVFs.     Wet prep with rare bacteria,  negative yeast and clue cells. Rh positive, no indication for Rhogam. Hemoglobin 10.2. No leukocytosis on CBC. BUN 0.7, BUN 15. LFTs within normal limits.UA with negative nitrites, negative leukocytes. Beta . Abdominal UA with early pregnancy, gestational sac with mean sac diameter corresponding to a gestational age of 4 weeks 6 days with an estimated due date of 2020.  No fetal pole or yolk sac detected. Perigestational hemorrhage.    Reviewed results with patient. Discussed possibility of threatened . She is stable for discharge with instructions to follow up with her OB within 3-5 days to have Beta HCG and US repeated. [facility] as she has no regular gynecologist. Prescriptions given for acetaminophen and zofran for symptom relief. Strict ER return precautions given. All questions answered. Pt expressed understanding and is agreeable with the plan.     I have discussed the treatment and management of this patient with my supervising physician, and we agree on the plan of care.      JO Lopez Attestation:   Scribe #1: I performed the above scribed service and the documentation accurately describes the services I performed. I attest to the accuracy of the note.    Scribe attestation: I, Stacia Hutchinson, personally performed the services described in this documentation. All medical record entries made by the scribe were at my direction and in my presence.  I have reviewed the chart and agree that the record reflects my personal performance and is accurate and complete.             Clinical Impression:     1. Less than 8 weeks gestation of pregnancy    2. Threatened     3. Abdominal pain, unspecified abdominal location    4. Nausea            Disposition:   Disposition: Discharged  Condition: Stable                        Stacia Hutchinson PA-C  [phone]

## 2019-09-05 NOTE — ED TRIAGE NOTES
Pt reports  x1 month pregnant and has had nausea and vomiting x3 days with no OTC medication use. Pt also c/o of abdominal pain. Pt denies fever, or diarrhea.

## 2019-10-03 ENCOUNTER — ROUTINE PRENATAL (OUTPATIENT)
Dept: OBSTETRICS AND GYNECOLOGY | Facility: CLINIC | Age: 24
End: 2019-10-03
Payer: MEDICAID

## 2019-10-03 ENCOUNTER — LAB VISIT (OUTPATIENT)
Dept: LAB | Facility: HOSPITAL | Age: 24
End: 2019-10-03
Attending: OBSTETRICS & GYNECOLOGY
Payer: MEDICAID

## 2019-10-03 VITALS
SYSTOLIC BLOOD PRESSURE: 116 MMHG | DIASTOLIC BLOOD PRESSURE: 70 MMHG | BODY MASS INDEX: 29.05 KG/M2 | WEIGHT: 158.81 LBS

## 2019-10-03 DIAGNOSIS — Z3A.09 9 WEEKS GESTATION OF PREGNANCY: ICD-10-CM

## 2019-10-03 DIAGNOSIS — Z3A.09 9 WEEKS GESTATION OF PREGNANCY: Primary | ICD-10-CM

## 2019-10-03 LAB
ABO + RH BLD: NORMAL
ALBUMIN SERPL BCP-MCNC: 4.1 G/DL (ref 3.5–5.2)
ALP SERPL-CCNC: 89 U/L (ref 55–135)
ALT SERPL W/O P-5'-P-CCNC: 8 U/L (ref 10–44)
ANION GAP SERPL CALC-SCNC: 11 MMOL/L (ref 8–16)
AST SERPL-CCNC: 18 U/L (ref 10–40)
BILIRUB SERPL-MCNC: 0.6 MG/DL (ref 0.1–1)
BLD GP AB SCN CELLS X3 SERPL QL: NORMAL
BUN SERPL-MCNC: 14 MG/DL (ref 6–20)
CALCIUM SERPL-MCNC: 9.9 MG/DL (ref 8.7–10.5)
CHLORIDE SERPL-SCNC: 105 MMOL/L (ref 95–110)
CO2 SERPL-SCNC: 22 MMOL/L (ref 23–29)
CREAT SERPL-MCNC: 0.7 MG/DL (ref 0.5–1.4)
EST. GFR  (AFRICAN AMERICAN): >60 ML/MIN/1.73 M^2
EST. GFR  (NON AFRICAN AMERICAN): >60 ML/MIN/1.73 M^2
GLUCOSE SERPL-MCNC: 81 MG/DL (ref 70–110)
POTASSIUM SERPL-SCNC: 3.9 MMOL/L (ref 3.5–5.1)
PROT SERPL-MCNC: 8 G/DL (ref 6–8.4)
SODIUM SERPL-SCNC: 138 MMOL/L (ref 136–145)

## 2019-10-03 PROCEDURE — 86901 BLOOD TYPING SEROLOGIC RH(D): CPT

## 2019-10-03 PROCEDURE — 87491 CHLMYD TRACH DNA AMP PROBE: CPT

## 2019-10-03 PROCEDURE — 99213 PR OFFICE/OUTPT VISIT, EST, LEVL III, 20-29 MIN: ICD-10-PCS | Mod: TH,S$PBB,, | Performed by: OBSTETRICS & GYNECOLOGY

## 2019-10-03 PROCEDURE — 86703 HIV-1/HIV-2 1 RESULT ANTBDY: CPT

## 2019-10-03 PROCEDURE — 86803 HEPATITIS C AB TEST: CPT

## 2019-10-03 PROCEDURE — 87086 URINE CULTURE/COLONY COUNT: CPT

## 2019-10-03 PROCEDURE — 99999 PR PBB SHADOW E&M-EST. PATIENT-LVL II: ICD-10-PCS | Mod: PBBFAC,,, | Performed by: OBSTETRICS & GYNECOLOGY

## 2019-10-03 PROCEDURE — 83036 HEMOGLOBIN GLYCOSYLATED A1C: CPT

## 2019-10-03 PROCEDURE — 99213 OFFICE O/P EST LOW 20 MIN: CPT | Mod: TH,S$PBB,, | Performed by: OBSTETRICS & GYNECOLOGY

## 2019-10-03 PROCEDURE — 99212 OFFICE O/P EST SF 10 MIN: CPT | Mod: PBBFAC,25 | Performed by: OBSTETRICS & GYNECOLOGY

## 2019-10-03 PROCEDURE — 80053 COMPREHEN METABOLIC PANEL: CPT

## 2019-10-03 PROCEDURE — 36415 COLL VENOUS BLD VENIPUNCTURE: CPT

## 2019-10-03 PROCEDURE — 99999 PR PBB SHADOW E&M-EST. PATIENT-LVL II: CPT | Mod: PBBFAC,,, | Performed by: OBSTETRICS & GYNECOLOGY

## 2019-10-03 PROCEDURE — 87340 HEPATITIS B SURFACE AG IA: CPT

## 2019-10-03 PROCEDURE — 86592 SYPHILIS TEST NON-TREP QUAL: CPT

## 2019-10-03 PROCEDURE — 86762 RUBELLA ANTIBODY: CPT

## 2019-10-03 RX ORDER — ONDANSETRON 4 MG/1
TABLET, ORALLY DISINTEGRATING ORAL
Refills: 0 | Status: ON HOLD | COMMUNITY
Start: 2019-09-24 | End: 2020-04-25 | Stop reason: HOSPADM

## 2019-10-03 RX ORDER — ACETAMINOPHEN 500 MG
TABLET ORAL
Refills: 0 | COMMUNITY
Start: 2019-09-24 | End: 2019-10-15

## 2019-10-03 NOTE — PROGRESS NOTES
9w3d here for OB visit.    Pregnancy complicated by:  H/o  x 2, declined TOLAC    Pt has no major complaints today.  Denies vaginal bleeding, leakage of fluid, or contractions.  Order initial OB labs.  Pt declined first trimester aneuploidy screening, and state she would not terminate the pregnancy for any reasons.  Refer to MFM as pregnancy progresses.   Principles of prenatal care and precautions reviewed.  Return in 4 weeks, or sooner as needed.   Voiced understanding.   [place]iny[place] nurse/pt sister present for visit, declined Martti  services.        Harpreet Melgoza MD

## 2019-10-04 LAB
C TRACH DNA SPEC QL NAA+PROBE: NOT DETECTED
ESTIMATED AVG GLUCOSE: 94 MG/DL (ref 68–131)
HBA1C MFR BLD HPLC: 4.9 % (ref 4–5.6)
HBV SURFACE AG SERPL QL IA: NEGATIVE
HCV AB SERPL QL IA: NEGATIVE
HIV 1+2 AB+HIV1 P24 AG SERPL QL IA: NEGATIVE
N GONORRHOEA DNA SPEC QL NAA+PROBE: NOT DETECTED
RPR SER QL: NORMAL

## 2019-10-05 LAB — BACTERIA UR CULT: NORMAL

## 2019-10-07 LAB
RUBV IGG SER-ACNC: 12.2 IU/ML
RUBV IGG SER-IMP: REACTIVE

## 2019-10-15 ENCOUNTER — HOSPITAL ENCOUNTER (EMERGENCY)
Facility: HOSPITAL | Age: 24
Discharge: HOME | End: 2019-10-15
Attending: EMERGENCY MEDICINE
Payer: MEDICAID

## 2019-10-15 VITALS
SYSTOLIC BLOOD PRESSURE: 110 MMHG | DIASTOLIC BLOOD PRESSURE: 60 MMHG | OXYGEN SATURATION: 100 % | RESPIRATION RATE: 18 BRPM | TEMPERATURE: 99 F | HEART RATE: 93 BPM | WEIGHT: 163.13 LBS | HEIGHT: 62 IN | BODY MASS INDEX: 30.02 KG/M2

## 2019-10-15 DIAGNOSIS — R10.9 ABDOMINAL PAIN IN PREGNANCY, FIRST TRIMESTER: Primary | ICD-10-CM

## 2019-10-15 DIAGNOSIS — O46.8X1 SUBCHORIONIC HEMORRHAGE OF PLACENTA IN FIRST TRIMESTER, SINGLE OR UNSPECIFIED FETUS: ICD-10-CM

## 2019-10-15 DIAGNOSIS — O41.8X10 SUBCHORIONIC HEMORRHAGE OF PLACENTA IN FIRST TRIMESTER, SINGLE OR UNSPECIFIED FETUS: ICD-10-CM

## 2019-10-15 DIAGNOSIS — O26.891 ABDOMINAL PAIN IN PREGNANCY, FIRST TRIMESTER: Primary | ICD-10-CM

## 2019-10-15 LAB
ALBUMIN SERPL BCP-MCNC: 4 G/DL (ref 3.5–5.2)
ALP SERPL-CCNC: 92 U/L (ref 55–135)
ALT SERPL W/O P-5'-P-CCNC: 18 U/L (ref 10–44)
ANION GAP SERPL CALC-SCNC: 9 MMOL/L (ref 8–16)
AST SERPL-CCNC: 23 U/L (ref 10–40)
B-HCG UR QL: POSITIVE
BASOPHILS # BLD AUTO: 0.03 K/UL (ref 0–0.2)
BASOPHILS NFR BLD: 0.3 % (ref 0–1.9)
BILIRUB SERPL-MCNC: 0.5 MG/DL (ref 0.1–1)
BILIRUB UR QL STRIP: NEGATIVE
BUN SERPL-MCNC: 13 MG/DL (ref 6–20)
CALCIUM SERPL-MCNC: 9.5 MG/DL (ref 8.7–10.5)
CHLORIDE SERPL-SCNC: 106 MMOL/L (ref 95–110)
CLARITY UR: CLEAR
CO2 SERPL-SCNC: 23 MMOL/L (ref 23–29)
COLOR UR: YELLOW
CREAT SERPL-MCNC: 0.7 MG/DL (ref 0.5–1.4)
CTP QC/QA: YES
DIFFERENTIAL METHOD: ABNORMAL
EOSINOPHIL # BLD AUTO: 0.1 K/UL (ref 0–0.5)
EOSINOPHIL NFR BLD: 0.5 % (ref 0–8)
ERYTHROCYTE [DISTWIDTH] IN BLOOD BY AUTOMATED COUNT: 16.5 % (ref 11.5–14.5)
EST. GFR  (AFRICAN AMERICAN): >60 ML/MIN/1.73 M^2
EST. GFR  (NON AFRICAN AMERICAN): >60 ML/MIN/1.73 M^2
GLUCOSE SERPL-MCNC: 106 MG/DL (ref 70–110)
GLUCOSE UR QL STRIP: NEGATIVE
HCG INTACT+B SERPL-ACNC: NORMAL MIU/ML
HCT VFR BLD AUTO: 37.9 % (ref 37–48.5)
HGB BLD-MCNC: 12.3 G/DL (ref 12–16)
HGB UR QL STRIP: NEGATIVE
IMM GRANULOCYTES # BLD AUTO: 0.04 K/UL (ref 0–0.04)
IMM GRANULOCYTES NFR BLD AUTO: 0.4 % (ref 0–0.5)
KETONES UR QL STRIP: NEGATIVE
LEUKOCYTE ESTERASE UR QL STRIP: NEGATIVE
LIPASE SERPL-CCNC: 26 U/L (ref 4–60)
LYMPHOCYTES # BLD AUTO: 3.5 K/UL (ref 1–4.8)
LYMPHOCYTES NFR BLD: 34.1 % (ref 18–48)
MCH RBC QN AUTO: 28.1 PG (ref 27–31)
MCHC RBC AUTO-ENTMCNC: 32.5 G/DL (ref 32–36)
MCV RBC AUTO: 87 FL (ref 82–98)
MONOCYTES # BLD AUTO: 0.5 K/UL (ref 0.3–1)
MONOCYTES NFR BLD: 4.4 % (ref 4–15)
NEUTROPHILS # BLD AUTO: 6.2 K/UL (ref 1.8–7.7)
NEUTROPHILS NFR BLD: 60.3 % (ref 38–73)
NITRITE UR QL STRIP: NEGATIVE
NRBC BLD-RTO: 0 /100 WBC
PH UR STRIP: 6 [PH] (ref 5–8)
PLATELET # BLD AUTO: 388 K/UL (ref 150–350)
PMV BLD AUTO: 10.3 FL (ref 9.2–12.9)
POTASSIUM SERPL-SCNC: 3.7 MMOL/L (ref 3.5–5.1)
PROT SERPL-MCNC: 7.7 G/DL (ref 6–8.4)
PROT UR QL STRIP: NEGATIVE
RBC # BLD AUTO: 4.38 M/UL (ref 4–5.4)
SODIUM SERPL-SCNC: 138 MMOL/L (ref 136–145)
SP GR UR STRIP: 1.03 (ref 1–1.03)
URN SPEC COLLECT METH UR: NORMAL
UROBILINOGEN UR STRIP-ACNC: NEGATIVE EU/DL
WBC # BLD AUTO: 10.31 K/UL (ref 3.9–12.7)

## 2019-10-15 PROCEDURE — 81003 URINALYSIS AUTO W/O SCOPE: CPT

## 2019-10-15 PROCEDURE — 96374 THER/PROPH/DIAG INJ IV PUSH: CPT

## 2019-10-15 PROCEDURE — 87086 URINE CULTURE/COLONY COUNT: CPT

## 2019-10-15 PROCEDURE — 85025 COMPLETE CBC W/AUTO DIFF WBC: CPT

## 2019-10-15 PROCEDURE — 83690 ASSAY OF LIPASE: CPT

## 2019-10-15 PROCEDURE — 80053 COMPREHEN METABOLIC PANEL: CPT

## 2019-10-15 PROCEDURE — 99284 EMERGENCY DEPT VISIT MOD MDM: CPT | Mod: 25

## 2019-10-15 PROCEDURE — 84702 CHORIONIC GONADOTROPIN TEST: CPT

## 2019-10-15 PROCEDURE — 81025 URINE PREGNANCY TEST: CPT | Performed by: NURSE PRACTITIONER

## 2019-10-15 PROCEDURE — 63600175 PHARM REV CODE 636 W HCPCS: Performed by: NURSE PRACTITIONER

## 2019-10-15 PROCEDURE — 96361 HYDRATE IV INFUSION ADD-ON: CPT

## 2019-10-15 RX ORDER — ONDANSETRON 2 MG/ML
8 INJECTION INTRAMUSCULAR; INTRAVENOUS
Status: COMPLETED | OUTPATIENT
Start: 2019-10-15 | End: 2019-10-15

## 2019-10-15 RX ORDER — DEXTROMETHORPHAN HYDROBROMIDE, GUAIFENESIN 5; 100 MG/5ML; MG/5ML
650 LIQUID ORAL 3 TIMES DAILY PRN
Qty: 20 TABLET | Refills: 0 | Status: ON HOLD | OUTPATIENT
Start: 2019-10-15 | End: 2020-04-25 | Stop reason: HOSPADM

## 2019-10-15 RX ADMIN — ONDANSETRON HYDROCHLORIDE 8 MG: 2 SOLUTION INTRAMUSCULAR; INTRAVENOUS at 07:10

## 2019-10-15 RX ADMIN — SODIUM CHLORIDE 1000 ML: 0.9 INJECTION, SOLUTION INTRAVENOUS at 07:10

## 2019-10-15 NOTE — ED PROVIDER NOTES
"Encounter Date: 10/15/2019    SCRIBE #1 NOTE: I, Harpreet Briscoe, am scribing for, and in the presence of,  Edgardo Marie NP. I have scribed the following portions of the note - Other sections scribed: HPI/ROS/PE.       History     Chief Complaint   Patient presents with    Abdominal Pain     pt reports abd pain x3 days. states she is pregnant but unsure how far along. c/o of NV. denies med use.      CC: Abdominal Pain    HPI: This 24 y.o. Female with no pertinent medical hx presents to the ED for an emergent evaluation of suprapubic abdominal pain x 3 days. There is associated n/v. Pt also reports a headache x 2 days. Pt states, "there was a chunk of meat that came out of my vagina" previously. She denies any active vaginal bleeding, however. Of note, pt reports she is currently pregnant, but is unsure of how many weeks. She had an US done about 1.5 weeks ago by OB-GYN, Dr. Harpreet Melgoza. No alleviating factors. No prior tx today. Otherwise, pt denies fever, chills, diarrhea, rash, cough, and any other associated symptoms.    The history is provided by the patient. A  was used ([employer]).     Review of patient's allergies indicates:  No Known Allergies  Past Medical History:   Diagnosis Date    Depression      Past Surgical History:   Procedure Laterality Date     SECTION       SECTION N/A 2019    Procedure:  SECTION;  Surgeon: Ascencion Fajardo MD;  Location: [facility] L&D;  Service: OB/GYN;  Laterality: N/A;     Family History   Problem Relation Age of Onset    Diabetes Mother      Social History     Tobacco Use    Smoking status: Never Smoker    Smokeless tobacco: Never Used   Substance Use Topics    Alcohol use: No     Frequency: Never    Drug use: No     Review of Systems   Constitutional: Negative for chills and fever.   HENT: Negative for congestion, rhinorrhea and sore throat.    Eyes: Negative for pain and visual disturbance.   Respiratory: Negative " for cough and shortness of breath.    Cardiovascular: Negative for chest pain.   Gastrointestinal: Positive for abdominal pain, nausea and vomiting. Negative for diarrhea.   Genitourinary: Negative for difficulty urinating, dysuria, vaginal bleeding and vaginal discharge.   Musculoskeletal: Negative for neck stiffness.   Skin: Negative for rash.   Neurological: Positive for headaches.       Physical Exam     Initial Vitals [10/15/19 1747]   BP Pulse Resp Temp SpO2   112/85 89 18 98.1 °F (36.7 °C) 98 %      MAP       --         Physical Exam    Nursing note and vitals reviewed.  Constitutional: She appears well-developed and well-nourished. She is not diaphoretic. No distress.   HENT:   Mouth/Throat: Oropharynx is clear and moist.   Eyes: EOM are normal. Pupils are equal, round, and reactive to light.   Neck: Normal range of motion. Neck supple.   Cardiovascular: Normal rate and regular rhythm.   Pulmonary/Chest: Breath sounds normal. No respiratory distress.   Abdominal: Soft. Bowel sounds are normal. She exhibits no distension. There is no tenderness. There is no rebound and no guarding.   Musculoskeletal: Normal range of motion. She exhibits no edema or tenderness.   Neurological: She is alert and oriented to person, place, and time.   Skin: Skin is warm and dry. No rash noted.   Psychiatric: She has a normal mood and affect.         ED Course   Procedures  Labs Reviewed   CBC W/ AUTO DIFFERENTIAL - Abnormal; Notable for the following components:       Result Value    RDW 16.5 (*)     Platelets 388 (*)     All other components within normal limits   POCT URINE PREGNANCY - Abnormal; Notable for the following components:    POC Preg Test, Ur Positive (*)     All other components within normal limits   CULTURE, URINE   COMPREHENSIVE METABOLIC PANEL   HCG, QUANTITATIVE, PREGNANCY   LIPASE   URINALYSIS          Imaging Results          US OB Less Than 14 Wks with Transvag(xpd (Final result)  Result time 10/15/19  21:40:56    Final result by Breanna Kim MD (10/15/19 21:40:56)                 Impression:      Early live intrauterine pregnancy with crown-rump length corresponding to a 2 week gestation with an estimated due date by ultrasound of 05/10/2020.    Subchorionic hemorrhage.    Left corpus luteum.      Electronically signed by: Breanna Kim  Date:    10/15/2019  Time:    21:40             Narrative:    EXAMINATION:  ULTRASOUND OBSTETRICAL ULTRASOUND LESS THAN 14 WEEKS WITH TRANSVAGINAL    CLINICAL HISTORY:  vaginal bleeding and abd pain in early pregnancy;    TECHNIQUE:  Real-time ultrasound obstetrical ultrasound less than 14 weeks was performed transabdominally and  transvaginally.    COMPARISON:  2019    FINDINGS:  The uterus measures 12.1 x 6.6 x 2.0 cm. There are no uterine masses. A fetal pole, yolk sac, and gestational sac are all visualized.  The fetal pole has a crown-rump length of 30 mm, which corresponds to a gestational age of 10 weeks.  The fetal heart rate is 169 beats per minute.  A subchorionic hemorrhage is seen inferior to the gestational sac.    The right ovary measures 3.6 x 2.1 x 1.6 cm. The left ovary measures 4.3 x 2.1 x 3.4 cm.  There is a 2.2 x 1.4 x 2.4 cm left corpus luteum.    No free fluid is seen in the cul-de-sac.                                 Medical Decision Making:   History:   Old Medical Records: I decided to obtain old medical records.  Differential Diagnosis:   Spontaneous , missed , septic , ovarian cyst, tubo-ovarian abscess, ovarian torsion, appendicitis, others  ED Management:  HPI and physical exam as above.    Patient is afebrile, very well-appearing, pleasant, and in no distress.  She is complaining of right suprapubic and right lower quadrant abdominal pain. On exam there is no abdominal tenderness to palpation.  Abdomen is soft without rigidity or guarding. Completely benign abdominal exam.  I doubt appendicitis.  Labs with no  concerning acute abnormalities.  HCG is elevated from previous visit.  Today it is 147,738. Urinalysis with no evidence of infection or other acute abnormality.  Care will be be transferred to [facility] for disposition pending ultrasound results.    Priyank Bennett 2203: SLIUP @ approx 10wks from crown rump length with +cardiac activity. No vaginal complaints. No bleeding. Well-appearing, nontoxic. RLQ pain without tenderness. L sided corpus luteum cyst. Overall, low suspicion for emergent process. Work-up negative. Tylenol prn. OB f/u. Return precautions discussed.             Scribe Attestation:   Scribe #1: I performed the above scribed service and the documentation accurately describes the services I performed. I attest to the accuracy of the note.        Scribe attestation: I, Edgardo Marie NP, personally performed the services described in this documentation. All medical record entries made by the scribe were at my direction and in my presence.  I have reviewed the chart and agree that the record reflects my personal performance and is accurate and complete.          Clinical Impression:       ICD-10-CM ICD-9-CM   1. Abdominal pain in pregnancy, first trimester O26.891 646.83    R10.9 789.00   2. Subchorionic hemorrhage of placenta in first trimester, single or unspecified fetus O41.8X10 658.83    O46.8X1 641.83         Disposition:   Disposition: Discharged  Condition: Stable                        Priyank Bennett PA-C  10/15/19 2206

## 2019-10-15 NOTE — ED TRIAGE NOTES
The pt states she has a positive pregnancy test at home 8 weeks ago. Reports stomach pain, n/v/d that started 3 days ago.

## 2019-10-16 NOTE — DISCHARGE INSTRUCTIONS
Chastity un seguimiento con portillo OB-GYN según lo planeado. Tylenol según sea necesario para el malestar. Continúa tomando vitaminas prenatales. Regrese a myah servicio de urgencias si comienza a empeorar el dolor abdominal, si experimenta náuseas / vómitos no controlados, si comienza con fiebre, si comienza con sangrado vaginal, si ocurre algún otro problema.    Follow-up with your OB-GYN as planned. Tylenol as needed for discomfort. Continue taking prenatal vitamins. Please return to this ED if you begin with worsening abdominal pain, if you experience uncontrolled nausea/vomiting, if you begin with fever, if you begin with vaginal bleeding, if any other problems occur.

## 2019-10-17 LAB — BACTERIA UR CULT: NORMAL

## 2019-10-31 ENCOUNTER — ROUTINE PRENATAL (OUTPATIENT)
Dept: OBSTETRICS AND GYNECOLOGY | Facility: CLINIC | Age: 24
End: 2019-10-31
Payer: MEDICAID

## 2019-10-31 VITALS
BODY MASS INDEX: 28.97 KG/M2 | DIASTOLIC BLOOD PRESSURE: 76 MMHG | WEIGHT: 158.38 LBS | SYSTOLIC BLOOD PRESSURE: 114 MMHG

## 2019-10-31 DIAGNOSIS — Z3A.13 13 WEEKS GESTATION OF PREGNANCY: Primary | ICD-10-CM

## 2019-10-31 PROCEDURE — 99212 OFFICE O/P EST SF 10 MIN: CPT | Mod: PBBFAC | Performed by: OBSTETRICS & GYNECOLOGY

## 2019-10-31 PROCEDURE — 99213 PR OFFICE/OUTPT VISIT, EST, LEVL III, 20-29 MIN: ICD-10-PCS | Mod: TH,S$PBB,, | Performed by: OBSTETRICS & GYNECOLOGY

## 2019-10-31 PROCEDURE — 99999 PR PBB SHADOW E&M-EST. PATIENT-LVL II: ICD-10-PCS | Mod: PBBFAC,,, | Performed by: OBSTETRICS & GYNECOLOGY

## 2019-10-31 PROCEDURE — 99999 PR PBB SHADOW E&M-EST. PATIENT-LVL II: CPT | Mod: PBBFAC,,, | Performed by: OBSTETRICS & GYNECOLOGY

## 2019-10-31 PROCEDURE — 99213 OFFICE O/P EST LOW 20 MIN: CPT | Mod: TH,S$PBB,, | Performed by: OBSTETRICS & GYNECOLOGY

## 2019-10-31 NOTE — PROGRESS NOTES
13w3d here for OB visit.    Pregnancy complicated by:  H/o  x 2, declined TOLAC    Pt has no major complaints today.  Denies vaginal bleeding, leakage of fluid, or contractions.    Initial OB lab results d/w pt.  Pt declined first trimester aneuploidy screening, and state she would not terminate the pregnancy for any reasons.  Refer to MFM as pregnancy progresses.     Discussed daily low dose ASA for prevention of preE, IUGR, and stillbirths due to maternal risk factors.  Implications of obesity on pregnancy discussed.  Wgt goal discussed.  Pt decline early DM screen.    Principles of prenatal care and precautions reviewed.  Return in 4 weeks, or sooner as needed.   Voiced understanding.   Gibraltarian nurse/pt aunt and son present for visit, declined [facility]  services.        Harpreet Melgoza MD

## 2019-12-04 ENCOUNTER — LAB VISIT (OUTPATIENT)
Dept: LAB | Facility: HOSPITAL | Age: 24
End: 2019-12-04
Attending: OBSTETRICS & GYNECOLOGY
Payer: MEDICAID

## 2019-12-04 ENCOUNTER — ROUTINE PRENATAL (OUTPATIENT)
Dept: OBSTETRICS AND GYNECOLOGY | Facility: CLINIC | Age: 24
End: 2019-12-04
Payer: MEDICAID

## 2019-12-04 VITALS
BODY MASS INDEX: 29.23 KG/M2 | SYSTOLIC BLOOD PRESSURE: 112 MMHG | WEIGHT: 159.81 LBS | DIASTOLIC BLOOD PRESSURE: 74 MMHG

## 2019-12-04 DIAGNOSIS — Z36.3 ANTENATAL SCREENING FOR MALFORMATION USING ULTRASONICS: ICD-10-CM

## 2019-12-04 DIAGNOSIS — Z3A.18 18 WEEKS GESTATION OF PREGNANCY: ICD-10-CM

## 2019-12-04 DIAGNOSIS — Z3A.18 18 WEEKS GESTATION OF PREGNANCY: Primary | ICD-10-CM

## 2019-12-04 PROCEDURE — 99213 OFFICE O/P EST LOW 20 MIN: CPT | Mod: TH,S$PBB,, | Performed by: OBSTETRICS & GYNECOLOGY

## 2019-12-04 PROCEDURE — 99999 PR PBB SHADOW E&M-EST. PATIENT-LVL II: CPT | Mod: PBBFAC,,, | Performed by: OBSTETRICS & GYNECOLOGY

## 2019-12-04 PROCEDURE — 99212 OFFICE O/P EST SF 10 MIN: CPT | Mod: PBBFAC,TH | Performed by: OBSTETRICS & GYNECOLOGY

## 2019-12-04 PROCEDURE — 36415 COLL VENOUS BLD VENIPUNCTURE: CPT

## 2019-12-04 PROCEDURE — 99999 PR PBB SHADOW E&M-EST. PATIENT-LVL II: ICD-10-PCS | Mod: PBBFAC,,, | Performed by: OBSTETRICS & GYNECOLOGY

## 2019-12-04 PROCEDURE — 99213 PR OFFICE/OUTPT VISIT, EST, LEVL III, 20-29 MIN: ICD-10-PCS | Mod: TH,S$PBB,, | Performed by: OBSTETRICS & GYNECOLOGY

## 2019-12-04 PROCEDURE — 81511 FTL CGEN ABNOR FOUR ANAL: CPT

## 2019-12-04 NOTE — PROGRESS NOTES
18w2d here for OB visit.    Pregnancy complicated by:  H/o  x 2, declined TOLAC  Desires BTL    Pt has no major complaints today.  Reports active fetus.  Denies vaginal bleeding, leakage of fluid, or contractions.    Order quad screen today.  Refer to [facility] for anatomy ultrasound.    Discussed daily low dose ASA for prevention of preE, IUGR, and stillbirths due to maternal risk factors.  Implications of obesity on pregnancy discussed.  Wgt goal discussed.  Pt decline early DM screen.    Pt requesting BTL with .  Risks, benefits, and alternatives to RCD and BTL discussed.    Precautions reviewed.  Return in 4 weeks, or sooner as needed.   Voiced understanding.   Malian nurse/pt aunt and son present for visit, declined [facility]  services.        Harpreet Melgoza MD

## 2019-12-09 LAB
# FETUSES US: NORMAL
2ND TRIMESTER 4 SCREEN PNL SERPL: NEGATIVE
2ND TRIMESTER 4 SCREEN SERPL-IMP: NORMAL
AFP MOM SERPL: 0.72
AFP SERPL-MCNC: 25.8 NG/ML
AGE AT DELIVERY: 24
B-HCG MOM SERPL: 1.64
B-HCG SERPL-ACNC: 46.5 IU/ML
FET TS 21 RISK FROM MAT AGE: NORMAL
GA (DAYS): 1 D
GA (WEEKS): 17 WK
GA METHOD: NORMAL
IDDM PATIENT QL: NORMAL
INHIBIN A MOM SERPL: 1.11
INHIBIN A SERPL-MCNC: 167.9 PG/ML
SMOKING STATUS FTND: NORMAL
TS 18 RISK FETUS: NORMAL
TS 21 RISK FETUS: NORMAL
U ESTRIOL MOM SERPL: 0.88
U ESTRIOL SERPL-MCNC: 1.1 NG/ML

## 2019-12-23 ENCOUNTER — PROCEDURE VISIT (OUTPATIENT)
Dept: MATERNAL FETAL MEDICINE | Facility: CLINIC | Age: 24
End: 2019-12-23
Payer: MEDICAID

## 2019-12-23 DIAGNOSIS — Z36.3 ANTENATAL SCREENING FOR MALFORMATION USING ULTRASONICS: ICD-10-CM

## 2019-12-23 DIAGNOSIS — Z36.89 ENCOUNTER FOR FETAL ANATOMIC SURVEY: ICD-10-CM

## 2019-12-23 PROCEDURE — 76805 OB US >/= 14 WKS SNGL FETUS: CPT | Mod: 26,S$PBB,, | Performed by: OBSTETRICS & GYNECOLOGY

## 2019-12-23 PROCEDURE — 76805 PR US, OB 14+WKS, TRANSABD, SINGLE GESTATION: ICD-10-PCS | Mod: 26,S$PBB,, | Performed by: OBSTETRICS & GYNECOLOGY

## 2019-12-23 PROCEDURE — 76805 OB US >/= 14 WKS SNGL FETUS: CPT | Mod: PBBFAC,PO | Performed by: OBSTETRICS & GYNECOLOGY

## 2019-12-27 DIAGNOSIS — Z36.89 ENCOUNTER FOR FETAL ANATOMIC SURVEY: Primary | ICD-10-CM

## 2019-12-30 ENCOUNTER — ROUTINE PRENATAL (OUTPATIENT)
Dept: OBSTETRICS AND GYNECOLOGY | Facility: CLINIC | Age: 24
End: 2019-12-30
Payer: MEDICAID

## 2019-12-30 VITALS
BODY MASS INDEX: 30.04 KG/M2 | DIASTOLIC BLOOD PRESSURE: 70 MMHG | WEIGHT: 164.25 LBS | SYSTOLIC BLOOD PRESSURE: 114 MMHG

## 2019-12-30 DIAGNOSIS — Z3A.22 22 WEEKS GESTATION OF PREGNANCY: Primary | ICD-10-CM

## 2019-12-30 PROCEDURE — 99999 PR PBB SHADOW E&M-EST. PATIENT-LVL II: ICD-10-PCS | Mod: PBBFAC,,, | Performed by: OBSTETRICS & GYNECOLOGY

## 2019-12-30 PROCEDURE — 99213 OFFICE O/P EST LOW 20 MIN: CPT | Mod: TH,S$PBB,, | Performed by: OBSTETRICS & GYNECOLOGY

## 2019-12-30 PROCEDURE — 99999 PR PBB SHADOW E&M-EST. PATIENT-LVL II: CPT | Mod: PBBFAC,,, | Performed by: OBSTETRICS & GYNECOLOGY

## 2019-12-30 PROCEDURE — 99213 PR OFFICE/OUTPT VISIT, EST, LEVL III, 20-29 MIN: ICD-10-PCS | Mod: TH,S$PBB,, | Performed by: OBSTETRICS & GYNECOLOGY

## 2019-12-30 PROCEDURE — 99212 OFFICE O/P EST SF 10 MIN: CPT | Mod: PBBFAC | Performed by: OBSTETRICS & GYNECOLOGY

## 2019-12-30 RX ORDER — B-COMPLEX WITH VITAMIN C
TABLET ORAL
COMMUNITY
Start: 2019-12-13

## 2019-12-30 NOTE — PROGRESS NOTES
22w0d here for OB visit.    Pregnancy complicated by:  H/o  x 2, declined TOLAC  Desires BTL    No major complaints today.  Reports active fetus.  Denies vaginal bleeding, leakage of fluid, or contractions.    Pt seen [facility] for anatomy ultrasound.    Impression  =========  Ultrasound read only:  Nicole live intrauterine pregnancy.  Biometry agrees with the clinical dating.  Normal amniotic fluid volume by qualitative assessment.  Some of the fetal anatomy was suboptimally visualized. The fetal anatomy that was seen appeared normal.  Placenta is posterior, fundal without previa.  Transabdominal cervical length is normal.    Recommendation  ==============  Follow up ultrasound on 20 for growth/suboptimal fetal anatomy.    Quad screen negative.  Encourage daily low dose ASA.  Wgt goal discussed.  Pt decline early DM screen.    Labor/preE precautions.  Kick counts.  Return in 4 weeks, or sooner as needed.   Voiced understanding.   Chinese nurse present for visit, pt declined [facility]  services.        Harpreet Melgoza MD

## 2020-01-16 ENCOUNTER — HOSPITAL ENCOUNTER (EMERGENCY)
Facility: HOSPITAL | Age: 25
Discharge: HOME | End: 2020-01-16
Attending: EMERGENCY MEDICINE
Payer: MEDICAID

## 2020-01-16 VITALS
HEART RATE: 90 BPM | OXYGEN SATURATION: 98 % | TEMPERATURE: 99 F | BODY MASS INDEX: 31.15 KG/M2 | RESPIRATION RATE: 15 BRPM | HEIGHT: 61 IN | SYSTOLIC BLOOD PRESSURE: 122 MMHG | WEIGHT: 165 LBS | DIASTOLIC BLOOD PRESSURE: 79 MMHG

## 2020-01-16 DIAGNOSIS — R10.9 ABDOMINAL PAIN DURING PREGNANCY IN SECOND TRIMESTER: ICD-10-CM

## 2020-01-16 DIAGNOSIS — O26.892 ABDOMINAL PAIN DURING PREGNANCY IN SECOND TRIMESTER: ICD-10-CM
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
## 2020-01-16 PROCEDURE — 99282 EMERGENCY DEPT VISIT SF MDM: CPT

## 2020-01-16 PROCEDURE — 25000003 PHARM REV CODE 250: Performed by: EMERGENCY MEDICINE

## 2020-01-16 RX ORDER — ACETAMINOPHEN 500 MG
1000 TABLET ORAL
Status: COMPLETED | OUTPATIENT
Start: 2020-01-16 | End: 2020-01-16

## 2020-01-16 RX ADMIN — ACETAMINOPHEN 1000 MG: 500 TABLET ORAL at 09:01
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
## 2020-01-27 ENCOUNTER — ROUTINE PRENATAL (OUTPATIENT)
Dept: OBSTETRICS AND GYNECOLOGY | Facility: CLINIC | Age: 25
End: 2020-01-27
Payer: MEDICAID

## 2020-01-27 VITALS — SYSTOLIC BLOOD PRESSURE: 112 MMHG | BODY MASS INDEX: 32.1 KG/M2 | DIASTOLIC BLOOD PRESSURE: 74 MMHG | WEIGHT: 169.88 LBS

## 2020-01-27 DIAGNOSIS — Z3A.26 26 WEEKS GESTATION OF PREGNANCY: Primary | ICD-10-CM

## 2020-01-27 PROCEDURE — 99999 PR PBB SHADOW E&M-EST. PATIENT-LVL II: CPT | Mod: PBBFAC,,, | Performed by: OBSTETRICS & GYNECOLOGY

## 2020-01-27 PROCEDURE — 99213 PR OFFICE/OUTPT VISIT, EST, LEVL III, 20-29 MIN: ICD-10-PCS | Mod: TH,S$PBB,, | Performed by: OBSTETRICS & GYNECOLOGY

## 2020-01-27 PROCEDURE — 99213 OFFICE O/P EST LOW 20 MIN: CPT | Mod: TH,S$PBB,, | Performed by: OBSTETRICS & GYNECOLOGY

## 2020-01-27 PROCEDURE — 99999 PR PBB SHADOW E&M-EST. PATIENT-LVL II: ICD-10-PCS | Mod: PBBFAC,,, | Performed by: OBSTETRICS & GYNECOLOGY

## 2020-01-27 PROCEDURE — 99212 OFFICE O/P EST SF 10 MIN: CPT | Mod: PBBFAC | Performed by: OBSTETRICS & GYNECOLOGY

## 2020-01-27 NOTE — PROGRESS NOTES
26w0d here for OB visit.    Pregnancy complicated by:  H/o  x 2, declined TOLAC  Desires BTL    Pt has no major complaints today.  Reports active fetus.  Denies vaginal bleeding, leakage of fluid, or contractions.    Pt follow up ultrasound on 20 for growth/suboptimal fetal anatomy.  Order 1 hr glucola, CBC.  Encourage daily low dose ASA.  Wgt goal discussed.    Labor/preE precautions.  Kick counts.  Return 2 wks or sooner as needed.   Voiced understanding.   Chinese nurse, Ms. Gruber, present for visit, pt declined [facility]  services.        Harpreet Melgoza MD

## 2020-01-28 ENCOUNTER — TELEPHONE (OUTPATIENT)
Dept: OBSTETRICS AND GYNECOLOGY | Facility: CLINIC | Age: 25
End: 2020-01-28

## 2020-01-28 ENCOUNTER — LAB VISIT (OUTPATIENT)
Dept: LAB | Facility: HOSPITAL | Age: 25
End: 2020-01-28
Attending: OBSTETRICS & GYNECOLOGY
Payer: MEDICAID

## 2020-01-28 DIAGNOSIS — Z3A.26 26 WEEKS GESTATION OF PREGNANCY: ICD-10-CM

## 2020-01-28 DIAGNOSIS — Z3A.26 26 WEEKS GESTATION OF PREGNANCY: Primary | ICD-10-CM

## 2020-01-28 LAB
BASOPHILS # BLD AUTO: 0.02 K/UL (ref 0–0.2)
BASOPHILS NFR BLD: 0.2 % (ref 0–1.9)
DIFFERENTIAL METHOD: ABNORMAL
EOSINOPHIL # BLD AUTO: 0.1 K/UL (ref 0–0.5)
EOSINOPHIL NFR BLD: 0.6 % (ref 0–8)
ERYTHROCYTE [DISTWIDTH] IN BLOOD BY AUTOMATED COUNT: 14 % (ref 11.5–14.5)
GLUCOSE SERPL-MCNC: 200 MG/DL (ref 70–140)
HCT VFR BLD AUTO: 32.2 % (ref 37–48.5)
HGB BLD-MCNC: 10.4 G/DL (ref 12–16)
IMM GRANULOCYTES # BLD AUTO: 0.12 K/UL (ref 0–0.04)
IMM GRANULOCYTES NFR BLD AUTO: 1.1 % (ref 0–0.5)
LYMPHOCYTES # BLD AUTO: 3.3 K/UL (ref 1–4.8)
LYMPHOCYTES NFR BLD: 30.3 % (ref 18–48)
MCH RBC QN AUTO: 29.5 PG (ref 27–31)
MCHC RBC AUTO-ENTMCNC: 32.3 G/DL (ref 32–36)
MCV RBC AUTO: 91 FL (ref 82–98)
MONOCYTES # BLD AUTO: 0.5 K/UL (ref 0.3–1)
MONOCYTES NFR BLD: 4.5 % (ref 4–15)
NEUTROPHILS # BLD AUTO: 7 K/UL (ref 1.8–7.7)
NEUTROPHILS NFR BLD: 63.3 % (ref 38–73)
NRBC BLD-RTO: 0 /100 WBC
PLATELET # BLD AUTO: 323 K/UL (ref 150–350)
PMV BLD AUTO: 9.6 FL (ref 9.2–12.9)
RBC # BLD AUTO: 3.53 M/UL (ref 4–5.4)
WBC # BLD AUTO: 11 K/UL (ref 3.9–12.7)

## 2020-01-28 PROCEDURE — 85025 COMPLETE CBC W/AUTO DIFF WBC: CPT

## 2020-01-28 PROCEDURE — 82950 GLUCOSE TEST: CPT

## 2020-01-28 PROCEDURE — 36415 COLL VENOUS BLD VENIPUNCTURE: CPT

## 2020-01-28 NOTE — TELEPHONE ENCOUNTER
----- Message from Harpreet Melgoza MD sent at 1/28/2020 10:38 AM CST -----  Please notify pt her 1 hour glucola is ABNORMAL.  A 3 hour OGTT has been ordered for her.  Instruct pt to go to the lab to complete test ASAP.  Test should be done first thing in morning.  Fast after 10 PM night before test.  OK to drink water only.  Testing strongly advised.   Thanks

## 2020-01-29 ENCOUNTER — LAB VISIT (OUTPATIENT)
Dept: LAB | Facility: HOSPITAL | Age: 25
End: 2020-01-29
Attending: OBSTETRICS & GYNECOLOGY
Payer: MEDICAID

## 2020-01-29 DIAGNOSIS — Z3A.26 26 WEEKS GESTATION OF PREGNANCY: ICD-10-CM

## 2020-01-29 LAB
ESTIMATED AVG GLUCOSE: 91 MG/DL (ref 68–131)
GLUCOSE SERPL-MCNC: 143 MG/DL
GLUCOSE SERPL-MCNC: 190 MG/DL
GLUCOSE SERPL-MCNC: 90 MG/DL (ref 70–110)
GLUCOSE SERPL-MCNC: 98 MG/DL
HBA1C MFR BLD HPLC: 4.8 % (ref 4–5.6)

## 2020-01-29 PROCEDURE — 82952 GTT-ADDED SAMPLES: CPT

## 2020-01-29 PROCEDURE — 83036 HEMOGLOBIN GLYCOSYLATED A1C: CPT

## 2020-01-29 PROCEDURE — 82951 GLUCOSE TOLERANCE TEST (GTT): CPT

## 2020-01-29 PROCEDURE — 36415 COLL VENOUS BLD VENIPUNCTURE: CPT

## 2020-01-30 ENCOUNTER — TELEPHONE (OUTPATIENT)
Dept: OBSTETRICS AND GYNECOLOGY | Facility: CLINIC | Age: 25
End: 2020-01-30

## 2020-01-30 NOTE — TELEPHONE ENCOUNTER
----- Message from Harpreet Melgoza MD sent at 1/29/2020 12:28 PM CST -----  Please notify pt her 3 hr glucose test is normal.    Pt does not have gestational diabetes.  Encourage healthy lifestyle modifications, low carbs.  Thanks.

## 2020-01-31 ENCOUNTER — PROCEDURE VISIT (OUTPATIENT)
Dept: MATERNAL FETAL MEDICINE | Facility: CLINIC | Age: 25
End: 2020-01-31
Payer: MEDICAID

## 2020-01-31 DIAGNOSIS — Z36.89 ENCOUNTER FOR FETAL ANATOMIC SURVEY: ICD-10-CM

## 2020-01-31 PROCEDURE — 76816 OB US FOLLOW-UP PER FETUS: CPT | Mod: PBBFAC,PO | Performed by: OBSTETRICS & GYNECOLOGY

## 2020-01-31 PROCEDURE — 76816 OB US FOLLOW-UP PER FETUS: CPT | Mod: 26,S$PBB,, | Performed by: OBSTETRICS & GYNECOLOGY

## 2020-01-31 PROCEDURE — 76816 PR  US,PREGNANT UTERUS,F/U,TRANSABD APP: ICD-10-PCS | Mod: 26,S$PBB,, | Performed by: OBSTETRICS & GYNECOLOGY

## 2020-02-10 ENCOUNTER — ROUTINE PRENATAL (OUTPATIENT)
Dept: OBSTETRICS AND GYNECOLOGY | Facility: CLINIC | Age: 25
End: 2020-02-10
Payer: MEDICAID

## 2020-02-10 VITALS
BODY MASS INDEX: 32.35 KG/M2 | WEIGHT: 171.19 LBS | DIASTOLIC BLOOD PRESSURE: 62 MMHG | SYSTOLIC BLOOD PRESSURE: 112 MMHG

## 2020-02-10 DIAGNOSIS — Z3A.28 28 WEEKS GESTATION OF PREGNANCY: Primary | ICD-10-CM

## 2020-02-10 PROBLEM — O09.299 HX OF PREECLAMPSIA, PRIOR PREGNANCY, CURRENTLY PREGNANT: Status: RESOLVED | Noted: 2019-02-28 | Resolved: 2020-02-10

## 2020-02-10 PROBLEM — Z3A.39 39 WEEKS GESTATION OF PREGNANCY: Status: RESOLVED | Noted: 2019-02-28 | Resolved: 2020-02-10

## 2020-02-10 PROCEDURE — 99999 PR PBB SHADOW E&M-EST. PATIENT-LVL III: ICD-10-PCS | Mod: PBBFAC,,, | Performed by: OBSTETRICS & GYNECOLOGY

## 2020-02-10 PROCEDURE — 99213 OFFICE O/P EST LOW 20 MIN: CPT | Mod: PBBFAC | Performed by: OBSTETRICS & GYNECOLOGY

## 2020-02-10 PROCEDURE — 99999 PR PBB SHADOW E&M-EST. PATIENT-LVL III: CPT | Mod: PBBFAC,,, | Performed by: OBSTETRICS & GYNECOLOGY

## 2020-02-10 PROCEDURE — 99213 PR OFFICE/OUTPT VISIT, EST, LEVL III, 20-29 MIN: ICD-10-PCS | Mod: TH,S$PBB,, | Performed by: OBSTETRICS & GYNECOLOGY

## 2020-02-10 PROCEDURE — 99213 OFFICE O/P EST LOW 20 MIN: CPT | Mod: TH,S$PBB,, | Performed by: OBSTETRICS & GYNECOLOGY

## 2020-02-10 NOTE — PROGRESS NOTES
28w0d here for OB visit.    Pregnancy complicated by:  H/o  x 2, declined TOLAC  Desires BTL    No major complaints today.  Reports active fetus.  Denies vaginal bleeding, leakage of fluid, or contractions.    Pt follow up ultrasound on 20.    Impression  =========  US Read Only  Nicole, living IUP.  EFW plots at the 56th percentile for gestational age.  The visualized anatomy appears normal.  The lumbosacral spine remains suboptimally imaged. However, the posterior fossa appeared normal on previous ultrasounds and limited views do not suggest an  abnormality. Additionally the maternal serum AFP is normal.  Normal AFV.  Follow-up ultrasound as clinically indicated.    1 hr glucola 200.  2 hrs normal.  Lab Results   Component Value Date    HGBA1C 4.8 2020   Wgt goal discussed.    Encourage daily low dose ASA.    Labor/preE precautions.  Kick counts.  Return 2 wks or sooner as needed.   Voiced understanding.   Armenian nurse, Ms. Gruber, present for visit, pt declined [facility]  services.        Harpreet Melgoza MD

## 2020-02-13 ENCOUNTER — HOSPITAL ENCOUNTER (OUTPATIENT)
Facility: HOSPITAL | Age: 25
Discharge: HOME | End: 2020-02-14
Attending: OBSTETRICS & GYNECOLOGY | Admitting: OBSTETRICS & GYNECOLOGY
Payer: MEDICAID

## 2020-02-13 DIAGNOSIS — R10.9 ABDOMINAL PAIN AFFECTING PREGNANCY: ICD-10-CM

## 2020-02-13 DIAGNOSIS — O26.899 ABDOMINAL PAIN AFFECTING PREGNANCY: ICD-10-CM

## 2020-02-13 PROCEDURE — 81000 URINALYSIS NONAUTO W/SCOPE: CPT

## 2020-02-13 PROCEDURE — 84112 EVAL AMNIOTIC FLUID PROTEIN: CPT

## 2020-02-13 PROCEDURE — 99211 OFF/OP EST MAY X REQ PHY/QHP: CPT

## 2020-02-13 RX ORDER — ACETAMINOPHEN 500 MG
500 TABLET ORAL EVERY 6 HOURS PRN
Status: DISCONTINUED | OUTPATIENT
Start: 2020-02-14 | End: 2020-02-14 | Stop reason: HOSPADM

## 2020-02-13 RX ORDER — ONDANSETRON 8 MG/1
8 TABLET, ORALLY DISINTEGRATING ORAL EVERY 8 HOURS PRN
Status: DISCONTINUED | OUTPATIENT
Start: 2020-02-14 | End: 2020-02-14 | Stop reason: HOSPADM

## 2020-02-14 VITALS
WEIGHT: 171.19 LBS | OXYGEN SATURATION: 100 % | SYSTOLIC BLOOD PRESSURE: 98 MMHG | DIASTOLIC BLOOD PRESSURE: 55 MMHG | BODY MASS INDEX: 32.32 KG/M2 | HEART RATE: 110 BPM | TEMPERATURE: 99 F | RESPIRATION RATE: 16 BRPM | HEIGHT: 61 IN

## 2020-02-14 LAB
AMORPH CRY URNS QL MICRO: NORMAL
BACTERIA #/AREA URNS HPF: NORMAL /HPF
BILIRUB UR QL STRIP: NEGATIVE
CLARITY UR: ABNORMAL
COLOR UR: YELLOW
GLUCOSE UR QL STRIP: ABNORMAL
HGB UR QL STRIP: NEGATIVE
KETONES UR QL STRIP: NEGATIVE
LEUKOCYTE ESTERASE UR QL STRIP: NEGATIVE
MICROSCOPIC COMMENT: NORMAL
NITRITE UR QL STRIP: NEGATIVE
PH UR STRIP: 7 [PH] (ref 5–8)
PROT UR QL STRIP: NEGATIVE
RBC #/AREA URNS HPF: 1 /HPF (ref 0–4)
RUPTURE OF MEMBRANE: NEGATIVE
SP GR UR STRIP: 1.01 (ref 1–1.03)
SQUAMOUS #/AREA URNS HPF: NORMAL /HPF
URN SPEC COLLECT METH UR: ABNORMAL
UROBILINOGEN UR STRIP-ACNC: NEGATIVE EU/DL
WBC #/AREA URNS HPF: 1 /HPF (ref 0–5)
YEAST URNS QL MICRO: NORMAL

## 2020-02-14 NOTE — DISCHARGE INSTRUCTIONS
Home Undelivered Discharge Instructions    After Discharge Orders:    Future Appointments   Date Time Provider Department Center   2/24/2020  3:20 PM Harpreet Melgoza MD [facility]         Current Discharge Medication List      CONTINUE these medications which have NOT CHANGED    Details   acetaminophen (TYLENOL) 650 MG TbSR Take 1 tablet (650 mg total) by mouth 3 (three) times daily as needed (pain).  Qty: 20 tablet, Refills: 0      ondansetron (ZOFRAN-ODT) 4 MG TbDL DISSOLVE 1 T UNDER THE TONGUE Q 6 H PRN  Refills: 0      PRENATAL VITAMIN 27 mg iron- 0.8 mg Tab TK 1 T PO D                     · Diet:  normal diet as tolerated    · Rest: normal activity as tolerated    Other instructions: Do kick counts once a day on your baby. Choose the time of day your baby is most active. Get in a comfortable lying or sitting position and time how long it takes to feel 10 kicks, twists, turns, swishes, or rolls. Call your physician or midwife if there have not been 10 kicks in 1 hours    Call physician or midwife, return to Labor and Delivery, call 911, or go to the nearest Emergency Room if: increased leakage or fluid, contractions 2 to 5 minutes apart for 1 hour, decreased fetal movement, persistent low back pain or cramping, bleeding from vaginal area, difficulty urinating, pain with urination, difficulty breathing, new calf pain, persistent headache or vision change

## 2020-02-14 NOTE — NURSING
Pt given discharge instructions via [employer]  [place], #[id number]. Pt verbalizes understanding, denies questions.

## 2020-02-14 NOTE — NURSING
"Pt presents to unit via EMS with c/o abdominal pain radiating to back pain starting yesterday. Pt also states that she had vaginal discharge 3 days ago; describes discharge as "white mucous" at first, and then watery, but denies current discharge. +FM. Pt denies recent sexual activity. Clean catch urine specimen obtained. FFN and ROM+ collected. SVE done, closed/thick/high, no fluid or blood noted on exam. POC reviewed with pt, verbalizes understanding. Call light placed within reach.  "

## 2020-03-12 ENCOUNTER — ROUTINE PRENATAL (OUTPATIENT)
Dept: OBSTETRICS AND GYNECOLOGY | Facility: CLINIC | Age: 25
End: 2020-03-12
Payer: MEDICAID

## 2020-03-12 ENCOUNTER — CLINICAL SUPPORT (OUTPATIENT)
Dept: OBSTETRICS AND GYNECOLOGY | Facility: CLINIC | Age: 25
End: 2020-03-12
Payer: MEDICAID

## 2020-03-12 VITALS — BODY MASS INDEX: 32.89 KG/M2 | HEIGHT: 61 IN | WEIGHT: 174.19 LBS

## 2020-03-12 VITALS
WEIGHT: 174.19 LBS | DIASTOLIC BLOOD PRESSURE: 75 MMHG | BODY MASS INDEX: 32.91 KG/M2 | SYSTOLIC BLOOD PRESSURE: 110 MMHG

## 2020-03-12 DIAGNOSIS — Z23 NEED FOR TDAP VACCINATION: Primary | ICD-10-CM

## 2020-03-12 DIAGNOSIS — Z3A.32 32 WEEKS GESTATION OF PREGNANCY: Primary | ICD-10-CM

## 2020-03-12 PROCEDURE — 99999 PR PBB SHADOW E&M-EST. PATIENT-LVL II: ICD-10-PCS | Mod: PBBFAC,,,

## 2020-03-12 PROCEDURE — 99212 OFFICE O/P EST SF 10 MIN: CPT | Mod: PBBFAC,27,25

## 2020-03-12 PROCEDURE — 90471 IMMUNIZATION ADMIN: CPT | Mod: PBBFAC,VFC

## 2020-03-12 PROCEDURE — 99213 OFFICE O/P EST LOW 20 MIN: CPT | Mod: TH,S$PBB,, | Performed by: OBSTETRICS & GYNECOLOGY

## 2020-03-12 PROCEDURE — 99212 OFFICE O/P EST SF 10 MIN: CPT | Mod: PBBFAC | Performed by: OBSTETRICS & GYNECOLOGY

## 2020-03-12 PROCEDURE — 99999 PR PBB SHADOW E&M-EST. PATIENT-LVL II: ICD-10-PCS | Mod: PBBFAC,,, | Performed by: OBSTETRICS & GYNECOLOGY

## 2020-03-12 PROCEDURE — 99213 PR OFFICE/OUTPT VISIT, EST, LEVL III, 20-29 MIN: ICD-10-PCS | Mod: TH,S$PBB,, | Performed by: OBSTETRICS & GYNECOLOGY

## 2020-03-12 PROCEDURE — 99999 PR PBB SHADOW E&M-EST. PATIENT-LVL II: CPT | Mod: PBBFAC,,, | Performed by: OBSTETRICS & GYNECOLOGY

## 2020-03-12 PROCEDURE — 99999 PR PBB SHADOW E&M-EST. PATIENT-LVL II: CPT | Mod: PBBFAC,,,

## 2020-03-12 NOTE — PROGRESS NOTES
PT GIVEN TDAP TO L DELTOID WITHOUT COMPLICATIONS. ADVISED TO WAIT IN WAITING ROOM FOR 15 MINS FOR POSSIBLE REACTIONS

## 2020-03-12 NOTE — PROGRESS NOTES
32w3d here for OB visit.    Pregnancy complicated by:  H/o  x 2, declined TOLAC  Desires BTL  1 hr glucola abnormal, 3 hr OGTT normal    Pt has no major complaints today.  Reports active fetus.  Denies vaginal bleeding, leakage of fluid, or contractions.  Encourage healthy lifestyle modifications.  Encourage daily low dose ASA.  Risks and benefits of the Tdap vaccine discussed and encouraged.   Labor/preE precautions.  Kick counts.  Return 2 wks or sooner as needed.   Voiced understanding.   Urdu nurse present for visit, pt declined [facility]  services.        Harpreet Melgoza MD

## 2020-03-26 ENCOUNTER — ROUTINE PRENATAL (OUTPATIENT)
Dept: OBSTETRICS AND GYNECOLOGY | Facility: CLINIC | Age: 25
End: 2020-03-26
Payer: MEDICAID

## 2020-03-26 VITALS — BODY MASS INDEX: 33.25 KG/M2 | WEIGHT: 176 LBS | SYSTOLIC BLOOD PRESSURE: 117 MMHG | DIASTOLIC BLOOD PRESSURE: 67 MMHG

## 2020-03-26 DIAGNOSIS — Z3A.34 34 WEEKS GESTATION OF PREGNANCY: Primary | ICD-10-CM

## 2020-03-26 PROCEDURE — 99212 OFFICE O/P EST SF 10 MIN: CPT | Mod: PBBFAC | Performed by: OBSTETRICS & GYNECOLOGY

## 2020-03-26 PROCEDURE — 99213 PR OFFICE/OUTPT VISIT, EST, LEVL III, 20-29 MIN: ICD-10-PCS | Mod: TH,S$PBB,, | Performed by: OBSTETRICS & GYNECOLOGY

## 2020-03-26 PROCEDURE — 99999 PR PBB SHADOW E&M-EST. PATIENT-LVL II: ICD-10-PCS | Mod: PBBFAC,,, | Performed by: OBSTETRICS & GYNECOLOGY

## 2020-03-26 PROCEDURE — 99213 OFFICE O/P EST LOW 20 MIN: CPT | Mod: TH,S$PBB,, | Performed by: OBSTETRICS & GYNECOLOGY

## 2020-03-26 PROCEDURE — 99999 PR PBB SHADOW E&M-EST. PATIENT-LVL II: CPT | Mod: PBBFAC,,, | Performed by: OBSTETRICS & GYNECOLOGY

## 2020-03-26 NOTE — PROGRESS NOTES
34w3d here for OB visit.    Pregnancy complicated by:  H/o  x 2, declined TOLAC  Desires BTL  1 hr glucola abnormal, 3 hr OGTT normal    No major complaints today.  Reports active fetus.  Denies vaginal bleeding, leakage of fluid, or contractions.  Encourage healthy lifestyle modifications.  Encourage daily low dose ASA.  Labor/preE precautions.  Kick counts.  Return 2 wks or sooner as needed.   Guyanese nurse, Ms. Castillo, present for visit, pt declined [facility]  services.  Voiced understanding.          Harpreet Melgoza MD

## 2020-04-08 ENCOUNTER — ROUTINE PRENATAL (OUTPATIENT)
Dept: OBSTETRICS AND GYNECOLOGY | Facility: CLINIC | Age: 25
End: 2020-04-08
Payer: MEDICAID

## 2020-04-08 VITALS
BODY MASS INDEX: 33.87 KG/M2 | WEIGHT: 179.25 LBS | SYSTOLIC BLOOD PRESSURE: 112 MMHG | DIASTOLIC BLOOD PRESSURE: 63 MMHG

## 2020-04-08 DIAGNOSIS — Z3A.36 36 WEEKS GESTATION OF PREGNANCY: Primary | ICD-10-CM

## 2020-04-08 PROCEDURE — 99999 PR PBB SHADOW E&M-EST. PATIENT-LVL II: CPT | Mod: PBBFAC,,, | Performed by: OBSTETRICS & GYNECOLOGY

## 2020-04-08 PROCEDURE — 99213 PR OFFICE/OUTPT VISIT, EST, LEVL III, 20-29 MIN: ICD-10-PCS | Mod: TH,S$PBB,, | Performed by: OBSTETRICS & GYNECOLOGY

## 2020-04-08 PROCEDURE — 99999 PR PBB SHADOW E&M-EST. PATIENT-LVL II: ICD-10-PCS | Mod: PBBFAC,,, | Performed by: OBSTETRICS & GYNECOLOGY

## 2020-04-08 PROCEDURE — 99213 OFFICE O/P EST LOW 20 MIN: CPT | Mod: TH,S$PBB,, | Performed by: OBSTETRICS & GYNECOLOGY

## 2020-04-08 PROCEDURE — 87081 CULTURE SCREEN ONLY: CPT

## 2020-04-08 PROCEDURE — 99212 OFFICE O/P EST SF 10 MIN: CPT | Mod: PBBFAC | Performed by: OBSTETRICS & GYNECOLOGY

## 2020-04-08 NOTE — PROGRESS NOTES
36w2d here for OB visit.    Pregnancy complicated by:  H/o  x 2, declined TOLAC  Desires BTL  1 hr glucola abnormal, 3 hr OGTT normal    Pt has no major complaints today.  Reports active fetus.  Denies vaginal bleeding, leakage of fluid, or contractions.    Order CBC, HIV, GBS.  Pt is requesting repeat  delivery and BTL.  Risks, benefits, and alternatives to RCD and BTL discussed.  Delivery consents signed.    Encourage healthy lifestyle modifications.  Encourage daily low dose ASA.    Labor/preE precautions.  Kick counts.  Return 1 wk or sooner as needed.   Kyrgyz nurse present for visit, pt declined [facility]  services.  Voiced understanding.          Harpreet Melgoza MD

## 2020-04-10 LAB — BACTERIA SPEC AEROBE CULT: NORMAL

## 2020-04-11 ENCOUNTER — HOSPITAL ENCOUNTER (EMERGENCY)
Facility: HOSPITAL | Age: 25
Discharge: HOME | End: 2020-04-11
Attending: EMERGENCY MEDICINE
Payer: MEDICAID

## 2020-04-11 VITALS
OXYGEN SATURATION: 96 % | TEMPERATURE: 98 F | HEART RATE: 101 BPM | RESPIRATION RATE: 16 BRPM | SYSTOLIC BLOOD PRESSURE: 105 MMHG | DIASTOLIC BLOOD PRESSURE: 68 MMHG

## 2020-04-11 DIAGNOSIS — U07.1 COVID-19: Primary | ICD-10-CM

## 2020-04-11 DIAGNOSIS — R11.2 NON-INTRACTABLE VOMITING WITH NAUSEA, UNSPECIFIED VOMITING TYPE: ICD-10-CM

## 2020-04-11 DIAGNOSIS — R00.0 TACHYCARDIA: ICD-10-CM

## 2020-04-11 LAB
ALBUMIN SERPL BCP-MCNC: 2.9 G/DL (ref 3.5–5.2)
ALP SERPL-CCNC: 204 U/L (ref 55–135)
ALT SERPL W/O P-5'-P-CCNC: 9 U/L (ref 10–44)
ANION GAP SERPL CALC-SCNC: 12 MMOL/L (ref 8–16)
AST SERPL-CCNC: 30 U/L (ref 10–40)
BACTERIA #/AREA URNS HPF: ABNORMAL /HPF
BASOPHILS # BLD AUTO: 0.03 K/UL (ref 0–0.2)
BASOPHILS NFR BLD: 0.3 % (ref 0–1.9)
BILIRUB SERPL-MCNC: 0.8 MG/DL (ref 0.1–1)
BILIRUB UR QL STRIP: NEGATIVE
BUN SERPL-MCNC: 4 MG/DL (ref 6–20)
CALCIUM SERPL-MCNC: 8.7 MG/DL (ref 8.7–10.5)
CHLORIDE SERPL-SCNC: 106 MMOL/L (ref 95–110)
CLARITY UR: ABNORMAL
CO2 SERPL-SCNC: 18 MMOL/L (ref 23–29)
COLOR UR: YELLOW
CREAT SERPL-MCNC: 0.6 MG/DL (ref 0.5–1.4)
DIFFERENTIAL METHOD: ABNORMAL
EOSINOPHIL # BLD AUTO: 0 K/UL (ref 0–0.5)
EOSINOPHIL NFR BLD: 0.1 % (ref 0–8)
ERYTHROCYTE [DISTWIDTH] IN BLOOD BY AUTOMATED COUNT: 14.6 % (ref 11.5–14.5)
EST. GFR  (AFRICAN AMERICAN): >60 ML/MIN/1.73 M^2
EST. GFR  (NON AFRICAN AMERICAN): >60 ML/MIN/1.73 M^2
GLUCOSE SERPL-MCNC: 75 MG/DL (ref 70–110)
GLUCOSE UR QL STRIP: NEGATIVE
HCT VFR BLD AUTO: 36.4 % (ref 37–48.5)
HGB BLD-MCNC: 11.7 G/DL (ref 12–16)
HGB UR QL STRIP: NEGATIVE
IMM GRANULOCYTES # BLD AUTO: 0.12 K/UL (ref 0–0.04)
IMM GRANULOCYTES NFR BLD AUTO: 1.1 % (ref 0–0.5)
KETONES UR QL STRIP: NEGATIVE
LEUKOCYTE ESTERASE UR QL STRIP: NEGATIVE
LIPASE SERPL-CCNC: 14 U/L (ref 4–60)
LYMPHOCYTES # BLD AUTO: 1.8 K/UL (ref 1–4.8)
LYMPHOCYTES NFR BLD: 17 % (ref 18–48)
MCH RBC QN AUTO: 29.1 PG (ref 27–31)
MCHC RBC AUTO-ENTMCNC: 32.1 G/DL (ref 32–36)
MCV RBC AUTO: 91 FL (ref 82–98)
MICROSCOPIC COMMENT: ABNORMAL
MONOCYTES # BLD AUTO: 1 K/UL (ref 0.3–1)
MONOCYTES NFR BLD: 9 % (ref 4–15)
NEUTROPHILS # BLD AUTO: 7.7 K/UL (ref 1.8–7.7)
NEUTROPHILS NFR BLD: 72.5 % (ref 38–73)
NITRITE UR QL STRIP: NEGATIVE
NRBC BLD-RTO: 0 /100 WBC
PH UR STRIP: 7 [PH] (ref 5–8)
PLATELET # BLD AUTO: 289 K/UL (ref 150–350)
PMV BLD AUTO: 10.3 FL (ref 9.2–12.9)
POTASSIUM SERPL-SCNC: 3.8 MMOL/L (ref 3.5–5.1)
PROT SERPL-MCNC: 6.8 G/DL (ref 6–8.4)
PROT UR QL STRIP: NEGATIVE
RBC # BLD AUTO: 4.02 M/UL (ref 4–5.4)
SARS-COV-2 RDRP RESP QL NAA+PROBE: POSITIVE
SODIUM SERPL-SCNC: 136 MMOL/L (ref 136–145)
SP GR UR STRIP: 1.01 (ref 1–1.03)
SQUAMOUS #/AREA URNS HPF: 5 /HPF
URN SPEC COLLECT METH UR: ABNORMAL
UROBILINOGEN UR STRIP-ACNC: NEGATIVE EU/DL
WBC # BLD AUTO: 10.55 K/UL (ref 3.9–12.7)
WBC #/AREA URNS HPF: 3 /HPF (ref 0–5)

## 2020-04-11 PROCEDURE — 81000 URINALYSIS NONAUTO W/SCOPE: CPT

## 2020-04-11 PROCEDURE — 25000003 PHARM REV CODE 250: Performed by: NURSE PRACTITIONER

## 2020-04-11 PROCEDURE — 63600175 PHARM REV CODE 636 W HCPCS: Performed by: EMERGENCY MEDICINE

## 2020-04-11 PROCEDURE — 80053 COMPREHEN METABOLIC PANEL: CPT

## 2020-04-11 PROCEDURE — 93005 ELECTROCARDIOGRAM TRACING: CPT

## 2020-04-11 PROCEDURE — 96361 HYDRATE IV INFUSION ADD-ON: CPT

## 2020-04-11 PROCEDURE — 93010 EKG 12-LEAD: ICD-10-PCS | Mod: ,,, | Performed by: INTERNAL MEDICINE

## 2020-04-11 PROCEDURE — 85025 COMPLETE CBC W/AUTO DIFF WBC: CPT

## 2020-04-11 PROCEDURE — 96374 THER/PROPH/DIAG INJ IV PUSH: CPT

## 2020-04-11 PROCEDURE — 25000003 PHARM REV CODE 250: Performed by: EMERGENCY MEDICINE

## 2020-04-11 PROCEDURE — U0002 COVID-19 LAB TEST NON-CDC: HCPCS

## 2020-04-11 PROCEDURE — 99284 EMERGENCY DEPT VISIT MOD MDM: CPT | Mod: 25

## 2020-04-11 PROCEDURE — 83690 ASSAY OF LIPASE: CPT

## 2020-04-11 PROCEDURE — 93010 ELECTROCARDIOGRAM REPORT: CPT | Mod: ,,, | Performed by: INTERNAL MEDICINE

## 2020-04-11 RX ORDER — ACETAMINOPHEN 500 MG
1000 TABLET ORAL
Status: COMPLETED | OUTPATIENT
Start: 2020-04-11 | End: 2020-04-11

## 2020-04-11 RX ORDER — ONDANSETRON 2 MG/ML
4 INJECTION INTRAMUSCULAR; INTRAVENOUS
Status: COMPLETED | OUTPATIENT
Start: 2020-04-11 | End: 2020-04-11

## 2020-04-11 RX ORDER — ONDANSETRON 4 MG/1
4 TABLET, ORALLY DISINTEGRATING ORAL EVERY 6 HOURS PRN
Qty: 14 TABLET | Refills: 0 | Status: ON HOLD | OUTPATIENT
Start: 2020-04-11 | End: 2020-04-25 | Stop reason: HOSPADM

## 2020-04-11 RX ADMIN — ACETAMINOPHEN 1000 MG: 500 TABLET ORAL at 12:04

## 2020-04-11 RX ADMIN — ONDANSETRON 4 MG: 2 INJECTION, SOLUTION INTRAMUSCULAR; INTRAVENOUS at 02:04

## 2020-04-11 RX ADMIN — SODIUM CHLORIDE 1000 ML: 0.9 INJECTION, SOLUTION INTRAVENOUS at 02:04

## 2020-04-11 NOTE — ED PROVIDER NOTES
Encounter Date: 2020       History     Chief Complaint   Patient presents with    Cough     Pt reports being 36 weeks pregnant. Pt c/o cough, headache, generalized body aches x4 days, fever, lower abdominal pain that started last night. Denies vaginal bleeding. Pain is 9/10. Pt took Tylenol at 0600 this AM    Abdominal Pain     24-year-old female who is AG 3 P 2 currently 36+ weeks pregnant complaining of cough, headache, body ache, nausea, vomiting ongoing for  4 days.  She is having intermittent periumbilical abdominal pain when she moves.  No vaginal bleeding or discharge.        Review of patient's allergies indicates:  No Known Allergies  Past Medical History:   Diagnosis Date    Depression      Past Surgical History:   Procedure Laterality Date     SECTION       SECTION N/A 2019    Procedure:  SECTION;  Surgeon: Ascencion Fajardo MD;  Location: [facility] L&D;  Service: OB/GYN;  Laterality: N/A;     Family History   Problem Relation Age of Onset    Diabetes Mother      Social History     Tobacco Use    Smoking status: Never Smoker    Smokeless tobacco: Never Used   Substance Use Topics    Alcohol use: No     Frequency: Never    Drug use: No     Review of Systems   Constitutional: Positive for appetite change, fatigue and fever.   HENT: Negative for sore throat.    Eyes: Negative for visual disturbance.   Respiratory: Positive for cough. Negative for shortness of breath.    Cardiovascular: Negative for chest pain.   Gastrointestinal: Positive for abdominal pain, nausea and vomiting. Negative for abdominal distention, blood in stool, constipation, diarrhea and rectal pain.   Genitourinary: Positive for dysuria. Negative for difficulty urinating.   Musculoskeletal: Negative for back pain.   Skin: Negative for rash.   Neurological: Negative for headaches.       Physical Exam     Initial Vitals [20 1219]   BP Pulse Resp Temp SpO2   101/64 (!) 136 18 (!) 100.5 °F (38.1  °C) 98 %      MAP       --         Physical Exam    Nursing note and vitals reviewed.  Constitutional: She appears well-developed and well-nourished. No distress.   HENT:   Mouth/Throat: Oropharynx is clear and moist.   Eyes: EOM are normal. Pupils are equal, round, and reactive to light.   Neck: Normal range of motion. Neck supple. No thyromegaly present. No JVD present.   Cardiovascular: Normal heart sounds and intact distal pulses. Exam reveals no gallop and no friction rub.    No murmur heard.  Initially tachycardic heart rate of 107   Pulmonary/Chest: Breath sounds normal. No respiratory distress.   Abdominal: Soft. Bowel sounds are normal.   Gravid abdomen with no abdominal tenderness   Musculoskeletal: Normal range of motion. She exhibits no edema or tenderness.   Neurological: She is alert and oriented to person, place, and time. She has normal strength.   Skin: Skin is warm and dry.         ED Course   Procedures  Labs Reviewed   SARS-COV-2 RNA AMPLIFICATION, QUAL - Abnormal; Notable for the following components:       Result Value    SARS-CoV-2 RNA, Amplification, Qual Positive (*)     All other components within normal limits    Narrative:     What symptom criteria does the patient meet?->Other (specify)  Please specify:->Pregnant   URINALYSIS, REFLEX TO URINE CULTURE - Abnormal; Notable for the following components:    Appearance, UA Cloudy (*)     All other components within normal limits    Narrative:     Preferred Collection Type->Urine, Clean Catch   CBC W/ AUTO DIFFERENTIAL - Abnormal; Notable for the following components:    Hemoglobin 11.7 (*)     Hematocrit 36.4 (*)     RDW 14.6 (*)     Immature Granulocytes 1.1 (*)     Immature Grans (Abs) 0.12 (*)     Lymph% 17.0 (*)     All other components within normal limits   COMPREHENSIVE METABOLIC PANEL - Abnormal; Notable for the following components:    CO2 18 (*)     BUN, Bld 4 (*)     Albumin 2.9 (*)     Alkaline Phosphatase 204 (*)     ALT 9 (*)      All other components within normal limits   URINALYSIS MICROSCOPIC - Abnormal; Notable for the following components:    Bacteria Few (*)     All other components within normal limits    Narrative:     Preferred Collection Type->Urine, Clean Catch   LIPASE          Imaging Results    None      Patient has tested positive corona virus.  Lab work shows no significant abnormalities.  Fetal heart tones were 140.  Patient's heart rate has normalized with some IV fluids.  Tolerating p.o. at this time.  Stable for discharge.                                     Clinical Impression:       ICD-10-CM ICD-9-CM   1. COVID-19 U07.1     J98.8    2. Tachycardia R00.0 785.0   3. Non-intractable vomiting with nausea, unspecified vomiting type R11.2 787.01             ED Disposition Condition    Discharge Stable        ED Prescriptions     Medication Sig Dispense Start Date End Date Auth. Provider    ondansetron (ZOFRAN-ODT) 4 MG TbDL Take 1 tablet (4 mg total) by mouth every 6 (six) hours as needed (nausea). 14 tablet 4/11/2020  Jimbo Casas MD        Follow-up Information     Follow up With Specialties Details Why Contact Info    Harpreet Melgoza MD Obstetrics and Gynecology Call in 1 day  [address]  [address]  [facility] [address]  [phone]      [facility] Emergency Medicine  As needed, If symptoms worsen [address]  [facility] [address] [phone]                                     Jimbo Casas MD  04/11/20 5789

## 2020-04-11 NOTE — DISCHARGE INSTRUCTIONS
Your coronavirus test was positive. Unfortunately there is no known treatment and we are not sure of the effects on your pregnancy. Return for worsening shortness of breath or any concerns.

## 2020-04-15 ENCOUNTER — HOSPITAL ENCOUNTER (OUTPATIENT)
Facility: HOSPITAL | Age: 25
Discharge: HOME | End: 2020-04-16
Attending: OBSTETRICS & GYNECOLOGY | Admitting: OBSTETRICS & GYNECOLOGY
Payer: MEDICAID

## 2020-04-15 ENCOUNTER — NURSE TRIAGE (OUTPATIENT)
Dept: ADMINISTRATIVE | Facility: CLINIC | Age: 25
End: 2020-04-15

## 2020-04-15 LAB
ABO + RH BLD: NORMAL
ALBUMIN SERPL BCP-MCNC: 2.9 G/DL (ref 3.5–5.2)
ALP SERPL-CCNC: 241 U/L (ref 55–135)
ALT SERPL W/O P-5'-P-CCNC: 58 U/L (ref 10–44)
ANION GAP SERPL CALC-SCNC: 13 MMOL/L (ref 8–16)
AST SERPL-CCNC: 171 U/L (ref 10–40)
BASOPHILS # BLD AUTO: 0.02 K/UL (ref 0–0.2)
BASOPHILS NFR BLD: 0.3 % (ref 0–1.9)
BILIRUB SERPL-MCNC: 1 MG/DL (ref 0.1–1)
BLD GP AB SCN CELLS X3 SERPL QL: NORMAL
BUN SERPL-MCNC: 4 MG/DL (ref 6–20)
CALCIUM SERPL-MCNC: 8.4 MG/DL (ref 8.7–10.5)
CHLORIDE SERPL-SCNC: 104 MMOL/L (ref 95–110)
CO2 SERPL-SCNC: 21 MMOL/L (ref 23–29)
CREAT SERPL-MCNC: 0.8 MG/DL (ref 0.5–1.4)
DIFFERENTIAL METHOD: ABNORMAL
EOSINOPHIL # BLD AUTO: 0 K/UL (ref 0–0.5)
EOSINOPHIL NFR BLD: 0 % (ref 0–8)
ERYTHROCYTE [DISTWIDTH] IN BLOOD BY AUTOMATED COUNT: 14.6 % (ref 11.5–14.5)
EST. GFR  (AFRICAN AMERICAN): >60 ML/MIN/1.73 M^2
EST. GFR  (NON AFRICAN AMERICAN): >60 ML/MIN/1.73 M^2
GLUCOSE SERPL-MCNC: 79 MG/DL (ref 70–110)
HCT VFR BLD AUTO: 40.6 % (ref 37–48.5)
HGB BLD-MCNC: 13.1 G/DL (ref 12–16)
HIV1+2 IGG SERPL QL IA.RAPID: NORMAL
IMM GRANULOCYTES # BLD AUTO: 0.04 K/UL (ref 0–0.04)
IMM GRANULOCYTES NFR BLD AUTO: 0.6 % (ref 0–0.5)
LYMPHOCYTES # BLD AUTO: 1.9 K/UL (ref 1–4.8)
LYMPHOCYTES NFR BLD: 29 % (ref 18–48)
MCH RBC QN AUTO: 29 PG (ref 27–31)
MCHC RBC AUTO-ENTMCNC: 32.3 G/DL (ref 32–36)
MCV RBC AUTO: 90 FL (ref 82–98)
MONOCYTES # BLD AUTO: 0.3 K/UL (ref 0.3–1)
MONOCYTES NFR BLD: 4.3 % (ref 4–15)
NEUTROPHILS # BLD AUTO: 4.3 K/UL (ref 1.8–7.7)
NEUTROPHILS NFR BLD: 65.8 % (ref 38–73)
NRBC BLD-RTO: 0 /100 WBC
PLATELET # BLD AUTO: 253 K/UL (ref 150–350)
PMV BLD AUTO: 10.2 FL (ref 9.2–12.9)
POTASSIUM SERPL-SCNC: 3.9 MMOL/L (ref 3.5–5.1)
PROT SERPL-MCNC: 7.1 G/DL (ref 6–8.4)
RBC # BLD AUTO: 4.52 M/UL (ref 4–5.4)
SODIUM SERPL-SCNC: 138 MMOL/L (ref 136–145)
WBC # BLD AUTO: 6.58 K/UL (ref 3.9–12.7)

## 2020-04-15 PROCEDURE — 80053 COMPREHEN METABOLIC PANEL: CPT

## 2020-04-15 PROCEDURE — 86850 RBC ANTIBODY SCREEN: CPT

## 2020-04-15 PROCEDURE — 63600175 PHARM REV CODE 636 W HCPCS: Performed by: OBSTETRICS & GYNECOLOGY

## 2020-04-15 PROCEDURE — 86703 HIV-1/HIV-2 1 RESULT ANTBDY: CPT

## 2020-04-15 PROCEDURE — 86592 SYPHILIS TEST NON-TREP QUAL: CPT

## 2020-04-15 PROCEDURE — 85025 COMPLETE CBC W/AUTO DIFF WBC: CPT

## 2020-04-15 PROCEDURE — 25000003 PHARM REV CODE 250: Performed by: OBSTETRICS & GYNECOLOGY

## 2020-04-15 PROCEDURE — 36415 COLL VENOUS BLD VENIPUNCTURE: CPT

## 2020-04-15 PROCEDURE — 99211 OFF/OP EST MAY X REQ PHY/QHP: CPT | Mod: 27

## 2020-04-15 RX ORDER — SODIUM CHLORIDE, SODIUM LACTATE, POTASSIUM CHLORIDE, CALCIUM CHLORIDE 600; 310; 30; 20 MG/100ML; MG/100ML; MG/100ML; MG/100ML
INJECTION, SOLUTION INTRAVENOUS CONTINUOUS
Status: DISCONTINUED | OUTPATIENT
Start: 2020-04-15 | End: 2020-04-16 | Stop reason: HOSPADM

## 2020-04-15 RX ORDER — ACETAMINOPHEN 500 MG
1000 TABLET ORAL EVERY 6 HOURS PRN
Status: DISCONTINUED | OUTPATIENT
Start: 2020-04-15 | End: 2020-04-16 | Stop reason: HOSPADM

## 2020-04-15 RX ADMIN — ACETAMINOPHEN 1000 MG: 500 TABLET ORAL at 09:04

## 2020-04-15 RX ADMIN — SODIUM CHLORIDE, SODIUM LACTATE, POTASSIUM CHLORIDE, AND CALCIUM CHLORIDE: .6; .31; .03; .02 INJECTION, SOLUTION INTRAVENOUS at 10:04

## 2020-04-15 RX ADMIN — SODIUM CHLORIDE, SODIUM LACTATE, POTASSIUM CHLORIDE, AND CALCIUM CHLORIDE 1000 ML: .6; .31; .03; .02 INJECTION, SOLUTION INTRAVENOUS at 08:04

## 2020-04-15 NOTE — TELEPHONE ENCOUNTER
"OB COVID-19 TRIAGE NOTE    Radha Akbar is a 24 y.o. female   at 37w2d in a pregnancy complicated by + Covid test results back today.    Patient is calling regarding contractions today. She endorses fever, chills, cough, shortness of breath. Patient reports contractions every about 10 minutes lasting about 1-2 minutes, with + fetal movement.  She denies loss of fluids and vaginal bleeding.    Assessment:     Patient Active Problem List   Diagnosis    Pregnancy    Hx of  section    Hx of acute pyelonephritis    Screening examination for sexually transmitted disease    Acute blood loss anemia    Abdominal pain affecting pregnancy        Plan:   1. Patient recommended to immediately seek care in an emergency department. Supervision staff notified of plan and referred facility.  Notified also of need for .    Jayne Goss  MSY3    Reason for Disposition   History of prior delivery (multipara) with contractions < 10 minutes apart, and present 1 hour    Additional Information   Negative: Pregnant < 37 weeks (i.e., )   Negative: Uncertain delivery date and possibly pregnant < 37 weeks (i.e., )   Negative: Umbilical cord hanging out of the vagina (shiny, white, curled appearance, "like telephone cord")   Negative: First baby (primipara) with contractions < 6 minutes apart, and present 2 hours   Negative: SEVERE constant abdominal pain (e.g., excruciating) and present > 1 hour   Negative: Severe bleeding (e.g., continuous red blood from vagina, or large blood clots)   Negative: Uncontrollable urge to push (i.e., feels like baby is coming out now)   Negative: Can see baby   Negative: Sounds like a life-threatening emergency to the triager    Protocols used: PREGNANCY - LABOR-A-OH      "

## 2020-04-16 VITALS
HEIGHT: 61 IN | DIASTOLIC BLOOD PRESSURE: 50 MMHG | OXYGEN SATURATION: 95 % | BODY MASS INDEX: 33.84 KG/M2 | SYSTOLIC BLOOD PRESSURE: 106 MMHG | HEART RATE: 115 BPM | WEIGHT: 179.25 LBS | TEMPERATURE: 98 F | RESPIRATION RATE: 18 BRPM

## 2020-04-16 DIAGNOSIS — U07.1 COVID-19 VIRUS DETECTED: ICD-10-CM

## 2020-04-16 LAB — RPR SER QL: NORMAL

## 2020-04-16 PROCEDURE — 25000003 PHARM REV CODE 250: Performed by: OBSTETRICS & GYNECOLOGY

## 2020-04-16 PROCEDURE — 63600175 PHARM REV CODE 636 W HCPCS: Performed by: OBSTETRICS & GYNECOLOGY

## 2020-04-16 PROCEDURE — 59025 FETAL NON-STRESS TEST: CPT | Mod: 26,,, | Performed by: OBSTETRICS & GYNECOLOGY

## 2020-04-16 PROCEDURE — 99213 PR OFFICE/OUTPT VISIT, EST, LEVL III, 20-29 MIN: ICD-10-PCS | Mod: 25,TH,, | Performed by: OBSTETRICS & GYNECOLOGY

## 2020-04-16 PROCEDURE — 59025 PR FETAL 2N-STRESS TEST: ICD-10-PCS | Mod: 26,,, | Performed by: OBSTETRICS & GYNECOLOGY

## 2020-04-16 PROCEDURE — 59025 FETAL NON-STRESS TEST: CPT

## 2020-04-16 PROCEDURE — 99213 OFFICE O/P EST LOW 20 MIN: CPT | Mod: 25,TH,, | Performed by: OBSTETRICS & GYNECOLOGY

## 2020-04-16 RX ADMIN — ACETAMINOPHEN 1000 MG: 500 TABLET ORAL at 05:04

## 2020-04-16 RX ADMIN — SODIUM CHLORIDE, SODIUM LACTATE, POTASSIUM CHLORIDE, AND CALCIUM CHLORIDE: .6; .31; .03; .02 INJECTION, SOLUTION INTRAVENOUS at 04:04

## 2020-04-16 NOTE — NURSING
"1927: pt arrived to L&D complaining of ctx x4 days, she was tested 4 days ago in the ER for Covid19 and is positive. She states that she's been to the ER in the past 4 days and that "they were so concerned about the covid test that they didn't send me to L&D." RN reviewing past record. No complaints of vaginal bleeding, leaking of fluid, pt does endorse positive fetal movement. SVE by RN, closed/thick/high, posterior.   2000: Dr Melgoza called to discuss pt condition. MD ordering IV placement, 1L LR bolus, followed by maintenance LR at 150mL/hr, CBC, CMP, RPR, type and screen. RN confirmed readback.  2030: IV started, fluid started, labs sent  2045: PO hydration encouraged  2124: RN discussing poc with MD  2146: Pt up to bathroom  2158: SVE by RN unchanged, PO tylenol given per MD orders  2300: Call placed to Dr Rhona MD giving RN orders to monitor pt overnight. RN discussing potential risk of exposing entire maternity floor, staff to pt with covid positive. MD states pt will stay due to tachycardia.  2315: RN at  discussing poc with pt via  ipad. Pt has no questions at this time, will continue to monitor.  2345: RN discussing poc with MD, if pt sustains regular pulse for an hour, with cat 1 tracing, pt can be discharged to home. MD ordering regular diet at this time. RN confirmed readback.  "

## 2020-04-16 NOTE — DISCHARGE INSTRUCTIONS
Home Undelivered Discharge Instructions    After Discharge Orders:    No future appointments.    Follow up with Dr. Melgoza during next prenatal visit, [phone]  Monitor your temperature three times a day, call MD for temp greater than 102.0  Monitor your pulse twice a day, call MD for heart rate greater than 120  Self quarantine for 14 days along with your immediate family living in your home.    Current Discharge Medication List      CONTINUE these medications which have NOT CHANGED    Details   acetaminophen (TYLENOL) 650 MG TbSR Take 1 tablet (650 mg total) by mouth 3 (three) times daily as needed (pain).  Qty: 20 tablet, Refills: 0      PRENATAL VITAMIN 27 mg iron- 0.8 mg Tab TK 1 T PO D      !! ondansetron (ZOFRAN-ODT) 4 MG TbDL DISSOLVE 1 T UNDER THE TONGUE Q 6 H PRN  Refills: 0      !! ondansetron (ZOFRAN-ODT) 4 MG TbDL Take 1 tablet (4 mg total) by mouth every 6 (six) hours as needed (nausea).  Qty: 14 tablet, Refills: 0       !! - Potential duplicate medications found. Please discuss with provider.                  · Diet:  normal diet as tolerated    · Rest: normal activity as tolerated    Other instructions: Do kick counts once a day on your baby. Choose the time of day your baby is most active. Get in a comfortable lying or sitting position and time how long it takes to feel 10 kicks, twists, turns, swishes, or rolls. Call your physician or midwife if there have not been 10 kicks in 2 hours    Call physician or midwife, return to Labor and Delivery, call 911, or go to the nearest Emergency Room if: increased leakage or fluid, contractions more than  3 per  1 hour, decreased fetal movement, persistent low back pain or cramping, bleeding from vaginal area, difficulty urinating, pain with urination, difficulty breathing, new calf pain, persistent headache or vision change

## 2020-04-16 NOTE — NURSING
Dr. Melgoza called unit with orders to d/c pt home. Pt to monitor temp TID, pulse BID and self quarantine along with her immediate family living in the home.    0755- Dr. Melgoza gave order for 1 h NST prior to d/c     0845- Pt d/c'd home. 1h NST reactive. D/c instructions given to pt, pt verb understanding. Pt wheeled off unit, NAD.

## 2020-04-16 NOTE — PROGRESS NOTES
"[facility]  Progress Note  Obstetrics & Gynecology    Date:  4/15/2020    Chief complaint:  Cramping/contraction, COVID 19 positive     Subjective:    Radha Akbar is a pleasant 24 y.o.  at 37w3d gestation who presents to L&D c/o  The cramping/contractions.  Contractions are irregular.  Pt denies any vaginal bleeding, leakage of fluid, and notes active fetal movement.      Pt tested positive for COVID 19 on .  Pt does not appear ill-appearing.  Pt reports mild subjective fever at home off and on, relieved with tylenol and was febrile upon arrival to hospital.  Pt denies SOB, significant cough, CP, headaches, vision changes, epigastric pain, acute swelling in her extremities.  Pt denies changes in activity or appetite.  Pt denies sick contract at home.      Pregnancy complicated by:  H/o  x 2, declined TOLAC  Desires BTL  1 hr glucola abnormal, 3 hr OGTT normal    Other than her labor complaints, the patient has no other major complaints.    Review of Systems:      Constitutional:  Positive fever. Denies fatigue.  HENT:  No headaches or visual disturbance  Respiratory:  No cough, shortness of breath  Cardiovascular:  No chest pain, leg swelling  Gastrointestinal:  No nausea, vomiting, constipation, blood in stool   Genitourinary:  No dysuria, frequency  Skin:  No rash, jaundice  Neurological:  No dizziness, weakness, headaches  Psychiatric/Behavioral:  No sleep disturbance, dysphoric mood    Objective:    Temp:  [98.4 °F (36.9 °C)-102 °F (38.9 °C)] 98.4 °F (36.9 °C)  Pulse:  [102-148] 115  Resp:  [18-20] 18  SpO2:  [91 %-100 %] 95 %  BP: ()/(48-76) 106/50    BP (!) 106/50 (BP Location: Left arm, Patient Position: Lying)   Pulse (!) 115   Temp 98.4 °F (36.9 °C) (Oral)   Resp 18   Ht 5' 1" (1.549 m)   Wt 81.3 kg (179 lb 3.7 oz)   LMP 2019 (Exact Date)   SpO2 95%   Breastfeeding? No   BMI 33.87 kg/m²      GENERAL:  NAD, AOx3  HEENT:  NCAT, " anicteric, moist mucus membranes.  Neck supple without masses.  LUNGS:  Clear to auscultation bilaterally.  HEART:  Regular rate & rhythm with physiologic heart sounds.  ABDOMEN:  Soft, non tender without any guarding, rigidity or rebound. Normoactive bowel sounds.  Size = dates.  PELVIC:  Normal female external genitalia without gross lesions, rashes or excoriations.  Cervix: Cervix closed, thick, high, posterior.  EXTREMITIES:  Symmetric without cramping, claudication, or edema LE. +2 distal pulses.  FROM.  SKIN:  No rashes, good turgor & capillary refill.  NEUROLOGIC:  CN II - XII grossly intact bilaterally. +2 DTR, symmetric.  PSYCH:  Mood & affect appropriate.       Laboratory:     Recent Labs   Lab 04/15/20  2004   WBC 6.58   RBC 4.52   HGB 13.1   HCT 40.6      MCV 90   MCH 29.0   MCHC 32.3     Recent Labs   Lab 04/15/20  2004   CALCIUM 8.4*   PROT 7.1      K 3.9   CO2 21*      BUN 4*   CREATININE 0.8   ALKPHOS 241*   ALT 58*   *   BILITOT 1.0     ABO:  O POS  HIV:  negative     NST:    NST overall reassuring prior to discharge.  FHT:  Baseline 130's, moderate variability, positive accelerations, no significant decelerations  [place]:  Mostly irritability, abd soft/NT, soft uterine tone  Very active fetus, audible    Hospital Course:      Pt was observed on L&D, received supportive care, and had no acute events.  Labor was ruled out.  Cramping improved prior to discharge.  Pt was febrile upon arrival, fever defervesce with supportive care.  Baby was tachy upon arrival and FHT baseline improved with supportive care.  Maternal and fetal status was overall reassuring.  Pt had no acute events during her time in triage and appeared to be in no acute distress and requested to go home. Pt states she has reliable transportation and will return immediately if she here sxs worsen.  Pt reports good family support and has reliable transportation.  Pt was discharged in stable condition.      Assessment:    1. 24 y.o.  at 37w3d  2. COVID 19 positive - without severe symptoms  3. H/o  x 2, declined TOLAC  4. Desires BTL  5. 1 hr glucola abnormal, 3 hr OGTT normal    Plan:    Discharge home.     Labor/preE precautions and kick counts instructions.    Supportive care for round ligament pain, tylenol prn.     Modified bed rest, pelvic rest    Discussed with pt our local health care system response to Covid-19.  Discussed preventive measures, social distancing, and parameters for testing.  Clinic and hospital mitigation policies and preventative strategies discussed.  Supportive care measures discussed.  Tylenol prn for fever, advised to monitor temp at home TID, call office if no improvement with supportive measures.  Recommend monitoring pulse, pt advised on how to check pulse at home, encourage hydration, chest precautions, advised to call office if pulse persists > 100 at rest.  Discussed self quarantine  Parameters to call reviewed  Recommended family members seek testing as clinically indicated    Plan for repeat  and BTL ~39 wks.  Risks, benefits, and alternatives to  and BTL discussed.    F/u in clinic in 1 week or sooner as needed.     Return to L&D if symptoms worsen, vaginal bleeding, leakage of fluid, contractions, decreased fetal movement, COVID symptoms, or any concerns.       All of her questions were answered to her satisfaction.  Pt voiced understanding and acceptance of hospital discharge.       Harpreet Melgoza MD

## 2020-04-16 NOTE — NURSING
Report received from AUBREY Rosales RN; care assumed. AAOx3, assessment completed. Denies pain, rates pain 0/10 using pain scale. Temp 98.4. POC reviewed with pt, pt verb understanding. Pt Nepali speaking only. Call bell within reach, will monitor. NAD.

## 2020-04-16 NOTE — PROGRESS NOTES
04/16/20 0233   TeleTim Blanco Note - Communication   Francis Nurse Communicated with Bedside Nurse Regarding: Fetal Status   TeleStmarge Blanco Note - Notification   Nurse Notified? Yes   Name of Nurse Cathy   No FHT's

## 2020-04-18 ENCOUNTER — NURSE TRIAGE (OUTPATIENT)
Dept: ADMINISTRATIVE | Facility: CLINIC | Age: 25
End: 2020-04-18

## 2020-04-18 NOTE — TELEPHONE ENCOUNTER
Call completed with assistance of Language line  Clary (#[id number]):  Patient with audible SOB with c/o increasing cough. Patient is  at 37w 6d gestation, reports no contractions or cramping at present. Explained she needs to proceed to [facility] ASAP, has a ride to take her now.  Patient states understanding and agrees with disposition.  [facility] ER notified of patient condition/ brought in by family.    Reason for Disposition   MODERATE difficulty breathing (e.g., speaks in phrases, SOB even at rest, pulse 100-120)    Protocols used: CORONAVIRUS (COVID-19) DIAGNOSED OR [id number]

## 2020-04-21 ENCOUNTER — ANESTHESIA EVENT (OUTPATIENT)
Dept: OBSTETRICS AND GYNECOLOGY | Facility: HOSPITAL | Age: 25
End: 2020-04-21
Payer: MEDICAID

## 2020-04-21 ENCOUNTER — HOSPITAL ENCOUNTER (INPATIENT)
Facility: HOSPITAL | Age: 25
LOS: 4 days | Discharge: HOME | End: 2020-04-25
Attending: OBSTETRICS & GYNECOLOGY | Admitting: OBSTETRICS & GYNECOLOGY
Payer: MEDICAID

## 2020-04-21 ENCOUNTER — ANESTHESIA (OUTPATIENT)
Dept: OBSTETRICS AND GYNECOLOGY | Facility: HOSPITAL | Age: 25
End: 2020-04-21
Payer: MEDICAID

## 2020-04-21 DIAGNOSIS — U07.1 COVID-19 VIRUS INFECTION: ICD-10-CM

## 2020-04-21 DIAGNOSIS — Z34.90 PREGNANCY: ICD-10-CM

## 2020-04-21 DIAGNOSIS — J81.1 PULMONARY EDEMA: ICD-10-CM

## 2020-04-21 LAB
ABO + RH BLD: NORMAL
ALBUMIN SERPL BCP-MCNC: 2.6 G/DL (ref 3.5–5.2)
ALLENS TEST: ABNORMAL
ALP SERPL-CCNC: 429 U/L (ref 55–135)
ALT SERPL W/O P-5'-P-CCNC: 500 U/L (ref 10–44)
ANION GAP SERPL CALC-SCNC: 16 MMOL/L (ref 8–16)
AST SERPL-CCNC: 1236 U/L (ref 10–40)
BILIRUB SERPL-MCNC: 7.7 MG/DL (ref 0.1–1)
BLD GP AB SCN CELLS X3 SERPL QL: NORMAL
BUN SERPL-MCNC: 7 MG/DL (ref 6–20)
CALCIUM SERPL-MCNC: 8.5 MG/DL (ref 8.7–10.5)
CHLORIDE SERPL-SCNC: 107 MMOL/L (ref 95–110)
CO2 SERPL-SCNC: 17 MMOL/L (ref 23–29)
CREAT SERPL-MCNC: 1.1 MG/DL (ref 0.5–1.4)
EST. GFR  (AFRICAN AMERICAN): >60 ML/MIN/1.73 M^2
EST. GFR  (NON AFRICAN AMERICAN): >60 ML/MIN/1.73 M^2
GLUCOSE SERPL-MCNC: 84 MG/DL (ref 70–110)
HCO3 UR-SCNC: 19.9 MMOL/L (ref 24–28)
PCO2 BLDA: 102 MMHG (ref 35–45)
PH SMN: 6.9 [PH] (ref 7.35–7.45)
PO2 BLDA: 7 MMHG (ref 80–100)
POC BE: -16 MMOL/L
POC SATURATED O2: 3 % (ref 95–100)
POC TCO2: 23 MMOL/L (ref 23–27)
POTASSIUM SERPL-SCNC: 4.1 MMOL/L (ref 3.5–5.1)
PROT SERPL-MCNC: 8 G/DL (ref 6–8.4)
SAMPLE: ABNORMAL
SITE: ABNORMAL
SODIUM SERPL-SCNC: 140 MMOL/L (ref 136–145)

## 2020-04-21 PROCEDURE — 88307 TISSUE EXAM BY PATHOLOGIST: CPT | Performed by: PATHOLOGY

## 2020-04-21 PROCEDURE — S0028 INJECTION, FAMOTIDINE, 20 MG: HCPCS | Performed by: ANESTHESIOLOGY

## 2020-04-21 PROCEDURE — 83735 ASSAY OF MAGNESIUM: CPT

## 2020-04-21 PROCEDURE — 59514 PR CESAREAN DELIVERY ONLY: ICD-10-PCS | Mod: 80,AT,, | Performed by: OBSTETRICS & GYNECOLOGY

## 2020-04-21 PROCEDURE — 63600175 PHARM REV CODE 636 W HCPCS: Performed by: OBSTETRICS & GYNECOLOGY

## 2020-04-21 PROCEDURE — 88307 PR  SURG PATH,LEVEL V: ICD-10-PCS | Mod: 26,,, | Performed by: PATHOLOGY

## 2020-04-21 PROCEDURE — 85384 FIBRINOGEN ACTIVITY: CPT

## 2020-04-21 PROCEDURE — 85379 FIBRIN DEGRADATION QUANT: CPT

## 2020-04-21 PROCEDURE — 59514 PRA REAN DELIVERY ONLY: ICD-10-PCS | Mod: CRNA,,, | Performed by: NURSE ANESTHETIST, CERTIFIED REGISTERED

## 2020-04-21 PROCEDURE — 99211 OFF/OP EST MAY X REQ PHY/QHP: CPT | Mod: 25

## 2020-04-21 PROCEDURE — 37000008 HC ANESTHESIA 1ST 15 MINUTES: Performed by: OBSTETRICS & GYNECOLOGY

## 2020-04-21 PROCEDURE — 85007 BL SMEAR W/DIFF WBC COUNT: CPT

## 2020-04-21 PROCEDURE — 80053 COMPREHEN METABOLIC PANEL: CPT

## 2020-04-21 PROCEDURE — 85730 THROMBOPLASTIN TIME PARTIAL: CPT

## 2020-04-21 PROCEDURE — 51702 INSERT TEMP BLADDER CATH: CPT

## 2020-04-21 PROCEDURE — S0020 INJECTION, BUPIVICAINE HYDRO: HCPCS | Performed by: ANESTHESIOLOGY

## 2020-04-21 PROCEDURE — 59514 PR CESAREAN DELIVERY ONLY: ICD-10-PCS | Mod: AT,,, | Performed by: OBSTETRICS & GYNECOLOGY

## 2020-04-21 PROCEDURE — 85027 COMPLETE CBC AUTOMATED: CPT

## 2020-04-21 PROCEDURE — 59025 FETAL NON-STRESS TEST: CPT | Mod: 26,,, | Performed by: OBSTETRICS & GYNECOLOGY

## 2020-04-21 PROCEDURE — 25000003 PHARM REV CODE 250: Performed by: ANESTHESIOLOGY

## 2020-04-21 PROCEDURE — 86850 RBC ANTIBODY SCREEN: CPT

## 2020-04-21 PROCEDURE — 85652 RBC SED RATE AUTOMATED: CPT

## 2020-04-21 PROCEDURE — 80053 COMPREHEN METABOLIC PANEL: CPT | Mod: 91

## 2020-04-21 PROCEDURE — 83880 ASSAY OF NATRIURETIC PEPTIDE: CPT

## 2020-04-21 PROCEDURE — 59514 PRA REAN DELIVERY ONLY: ICD-10-PCS | Mod: ANES,,, | Performed by: ANESTHESIOLOGY

## 2020-04-21 PROCEDURE — 83605 ASSAY OF LACTIC ACID: CPT

## 2020-04-21 PROCEDURE — 88307 TISSUE EXAM BY PATHOLOGIST: CPT | Mod: 26,,, | Performed by: PATHOLOGY

## 2020-04-21 PROCEDURE — 83615 LACTATE (LD) (LDH) ENZYME: CPT

## 2020-04-21 PROCEDURE — 59025 PR FETAL 2N-STRESS TEST: ICD-10-PCS | Mod: 26,,, | Performed by: OBSTETRICS & GYNECOLOGY

## 2020-04-21 PROCEDURE — 63600175 PHARM REV CODE 636 W HCPCS: Performed by: NURSE ANESTHETIST, CERTIFIED REGISTERED

## 2020-04-21 PROCEDURE — 59514 CESAREAN DELIVERY ONLY: CPT | Mod: AT,,, | Performed by: OBSTETRICS & GYNECOLOGY

## 2020-04-21 PROCEDURE — 86140 C-REACTIVE PROTEIN: CPT

## 2020-04-21 PROCEDURE — 59514 CESAREAN DELIVERY ONLY: CPT | Mod: 80,AT,, | Performed by: OBSTETRICS & GYNECOLOGY

## 2020-04-21 PROCEDURE — 59514 CESAREAN DELIVERY ONLY: CPT | Mod: ANES,,, | Performed by: ANESTHESIOLOGY

## 2020-04-21 PROCEDURE — 84100 ASSAY OF PHOSPHORUS: CPT

## 2020-04-21 PROCEDURE — 82728 ASSAY OF FERRITIN: CPT

## 2020-04-21 PROCEDURE — 25000003 PHARM REV CODE 250: Performed by: NURSE ANESTHETIST, CERTIFIED REGISTERED

## 2020-04-21 PROCEDURE — 82550 ASSAY OF CK (CPK): CPT

## 2020-04-21 PROCEDURE — 36000686 HC CESAREAN SECTION LEVEL II

## 2020-04-21 PROCEDURE — 99283 EMERGENCY DEPT VISIT LOW MDM: CPT | Mod: 25,27

## 2020-04-21 PROCEDURE — 72100002 HC LABOR CARE, 1ST 8 HOURS

## 2020-04-21 PROCEDURE — 84145 PROCALCITONIN (PCT): CPT

## 2020-04-21 PROCEDURE — 37000009 HC ANESTHESIA EA ADD 15 MINS: Performed by: OBSTETRICS & GYNECOLOGY

## 2020-04-21 PROCEDURE — 36000679 HC C/S TUBAL LIGATION, UNSCHEDULED

## 2020-04-21 PROCEDURE — 11000001 HC ACUTE MED/SURG PRIVATE ROOM

## 2020-04-21 PROCEDURE — 59514 CESAREAN DELIVERY ONLY: CPT | Mod: CRNA,,, | Performed by: NURSE ANESTHETIST, CERTIFIED REGISTERED

## 2020-04-21 PROCEDURE — 84484 ASSAY OF TROPONIN QUANT: CPT

## 2020-04-21 PROCEDURE — 85610 PROTHROMBIN TIME: CPT

## 2020-04-21 RX ORDER — OXYTOCIN/RINGER'S LACTATE 30/500 ML
95 PLASTIC BAG, INJECTION (ML) INTRAVENOUS ONCE
Status: COMPLETED | OUTPATIENT
Start: 2020-04-21 | End: 2020-04-21

## 2020-04-21 RX ORDER — MUPIROCIN 20 MG/G
OINTMENT TOPICAL 2 TIMES DAILY
Status: DISCONTINUED | OUTPATIENT
Start: 2020-04-21 | End: 2020-04-25 | Stop reason: HOSPADM

## 2020-04-21 RX ORDER — DOCUSATE SODIUM 100 MG/1
200 CAPSULE, LIQUID FILLED ORAL 2 TIMES DAILY
Status: DISCONTINUED | OUTPATIENT
Start: 2020-04-21 | End: 2020-04-25 | Stop reason: HOSPADM

## 2020-04-21 RX ORDER — SODIUM CHLORIDE, SODIUM LACTATE, POTASSIUM CHLORIDE, CALCIUM CHLORIDE 600; 310; 30; 20 MG/100ML; MG/100ML; MG/100ML; MG/100ML
INJECTION, SOLUTION INTRAVENOUS CONTINUOUS
Status: DISCONTINUED | OUTPATIENT
Start: 2020-04-21 | End: 2020-04-22

## 2020-04-21 RX ORDER — DIPHENHYDRAMINE HYDROCHLORIDE 50 MG/ML
12.5 INJECTION INTRAMUSCULAR; INTRAVENOUS
Status: DISCONTINUED | OUTPATIENT
Start: 2020-04-21 | End: 2020-04-25 | Stop reason: HOSPADM

## 2020-04-21 RX ORDER — BUPIVACAINE HYDROCHLORIDE 7.5 MG/ML
INJECTION, SOLUTION EPIDURAL; RETROBULBAR
Status: DISCONTINUED | OUTPATIENT
Start: 2020-04-21 | End: 2020-04-21

## 2020-04-21 RX ORDER — SODIUM CITRATE AND CITRIC ACID MONOHYDRATE 334; 500 MG/5ML; MG/5ML
30 SOLUTION ORAL ONCE
Status: COMPLETED | OUTPATIENT
Start: 2020-04-21 | End: 2020-04-21

## 2020-04-21 RX ORDER — IBUPROFEN 600 MG/1
600 TABLET ORAL EVERY 6 HOURS
Status: DISCONTINUED | OUTPATIENT
Start: 2020-04-22 | End: 2020-04-22

## 2020-04-21 RX ORDER — IBUPROFEN 200 MG
24 TABLET ORAL
Status: DISCONTINUED | OUTPATIENT
Start: 2020-04-21 | End: 2020-04-25 | Stop reason: HOSPADM

## 2020-04-21 RX ORDER — GLUCAGON 1 MG
1 KIT INJECTION
Status: DISCONTINUED | OUTPATIENT
Start: 2020-04-21 | End: 2020-04-25 | Stop reason: HOSPADM

## 2020-04-21 RX ORDER — EPHEDRINE SULFATE 50 MG/ML
INJECTION, SOLUTION INTRAVENOUS
Status: DISCONTINUED | OUTPATIENT
Start: 2020-04-21 | End: 2020-04-21

## 2020-04-21 RX ORDER — PRENATAL WITH FERROUS FUM AND FOLIC ACID 3080; 920; 120; 400; 22; 1.84; 3; 20; 10; 1; 12; 200; 27; 25; 2 [IU]/1; [IU]/1; MG/1; [IU]/1; MG/1; MG/1; MG/1; MG/1; MG/1; MG/1; UG/1; MG/1; MG/1; MG/1; MG/1
1 TABLET ORAL DAILY
Status: DISCONTINUED | OUTPATIENT
Start: 2020-04-22 | End: 2020-04-25 | Stop reason: HOSPADM

## 2020-04-21 RX ORDER — ONDANSETRON 8 MG/1
8 TABLET, ORALLY DISINTEGRATING ORAL EVERY 8 HOURS PRN
Status: DISCONTINUED | OUTPATIENT
Start: 2020-04-21 | End: 2020-04-25 | Stop reason: HOSPADM

## 2020-04-21 RX ORDER — ACETAMINOPHEN 325 MG/1
650 TABLET ORAL EVERY 6 HOURS
Status: COMPLETED | OUTPATIENT
Start: 2020-04-22 | End: 2020-04-23

## 2020-04-21 RX ORDER — AMOXICILLIN 250 MG
1 CAPSULE ORAL NIGHTLY PRN
Status: DISCONTINUED | OUTPATIENT
Start: 2020-04-21 | End: 2020-04-25 | Stop reason: HOSPADM

## 2020-04-21 RX ORDER — CEFAZOLIN SODIUM 2 G/50ML
2 SOLUTION INTRAVENOUS ONCE
Status: COMPLETED | OUTPATIENT
Start: 2020-04-21 | End: 2020-04-21

## 2020-04-21 RX ORDER — MORPHINE SULFATE 1 MG/ML
INJECTION, SOLUTION EPIDURAL; INTRATHECAL; INTRAVENOUS
Status: DISCONTINUED | OUTPATIENT
Start: 2020-04-21 | End: 2020-04-21

## 2020-04-21 RX ORDER — OXYCODONE HYDROCHLORIDE 5 MG/1
5 TABLET ORAL EVERY 4 HOURS PRN
Status: DISPENSED | OUTPATIENT
Start: 2020-04-21 | End: 2020-04-22

## 2020-04-21 RX ORDER — MIDAZOLAM HYDROCHLORIDE 1 MG/ML
INJECTION INTRAMUSCULAR; INTRAVENOUS
Status: DISCONTINUED | OUTPATIENT
Start: 2020-04-21 | End: 2020-04-21

## 2020-04-21 RX ORDER — ONDANSETRON 2 MG/ML
4 INJECTION INTRAMUSCULAR; INTRAVENOUS EVERY 6 HOURS PRN
Status: ACTIVE | OUTPATIENT
Start: 2020-04-21 | End: 2020-04-22

## 2020-04-21 RX ORDER — HYDROCODONE BITARTRATE AND ACETAMINOPHEN 7.5; 325 MG/1; MG/1
1 TABLET ORAL EVERY 4 HOURS PRN
Status: DISCONTINUED | OUTPATIENT
Start: 2020-04-21 | End: 2020-04-25 | Stop reason: HOSPADM

## 2020-04-21 RX ORDER — SIMETHICONE 80 MG
1 TABLET,CHEWABLE ORAL EVERY 6 HOURS PRN
Status: DISCONTINUED | OUTPATIENT
Start: 2020-04-21 | End: 2020-04-25 | Stop reason: HOSPADM

## 2020-04-21 RX ORDER — PHENYLEPHRINE HYDROCHLORIDE 10 MG/ML
INJECTION INTRAVENOUS
Status: DISCONTINUED | OUTPATIENT
Start: 2020-04-21 | End: 2020-04-21

## 2020-04-21 RX ORDER — FAMOTIDINE 10 MG/ML
20 INJECTION INTRAVENOUS ONCE
Status: COMPLETED | OUTPATIENT
Start: 2020-04-21 | End: 2020-04-21

## 2020-04-21 RX ORDER — IBUPROFEN 200 MG
16 TABLET ORAL
Status: DISCONTINUED | OUTPATIENT
Start: 2020-04-21 | End: 2020-04-25 | Stop reason: HOSPADM

## 2020-04-21 RX ORDER — BISACODYL 10 MG
10 SUPPOSITORY, RECTAL RECTAL ONCE AS NEEDED
Status: DISCONTINUED | OUTPATIENT
Start: 2020-04-21 | End: 2020-04-25 | Stop reason: HOSPADM

## 2020-04-21 RX ORDER — FENTANYL CITRATE 50 UG/ML
INJECTION, SOLUTION INTRAMUSCULAR; INTRAVENOUS
Status: DISCONTINUED | OUTPATIENT
Start: 2020-04-21 | End: 2020-04-21

## 2020-04-21 RX ORDER — HYDROCORTISONE 25 MG/G
CREAM TOPICAL 3 TIMES DAILY PRN
Status: DISCONTINUED | OUTPATIENT
Start: 2020-04-21 | End: 2020-04-25 | Stop reason: HOSPADM

## 2020-04-21 RX ORDER — OXYCODONE AND ACETAMINOPHEN 5; 325 MG/1; MG/1
1 TABLET ORAL EVERY 4 HOURS PRN
Status: DISCONTINUED | OUTPATIENT
Start: 2020-04-21 | End: 2020-04-25 | Stop reason: HOSPADM

## 2020-04-21 RX ORDER — DIPHENHYDRAMINE HCL 25 MG
25 CAPSULE ORAL EVERY 4 HOURS PRN
Status: DISCONTINUED | OUTPATIENT
Start: 2020-04-21 | End: 2020-04-25 | Stop reason: HOSPADM

## 2020-04-21 RX ORDER — ADHESIVE BANDAGE
30 BANDAGE TOPICAL 2 TIMES DAILY PRN
Status: DISCONTINUED | OUTPATIENT
Start: 2020-04-22 | End: 2020-04-25 | Stop reason: HOSPADM

## 2020-04-21 RX ORDER — SODIUM CHLORIDE, SODIUM LACTATE, POTASSIUM CHLORIDE, CALCIUM CHLORIDE 600; 310; 30; 20 MG/100ML; MG/100ML; MG/100ML; MG/100ML
INJECTION, SOLUTION INTRAVENOUS CONTINUOUS
Status: ACTIVE | OUTPATIENT
Start: 2020-04-21 | End: 2020-04-22

## 2020-04-21 RX ORDER — ACETAMINOPHEN 10 MG/ML
INJECTION, SOLUTION INTRAVENOUS
Status: DISCONTINUED | OUTPATIENT
Start: 2020-04-21 | End: 2020-04-21

## 2020-04-21 RX ORDER — OXYCODONE HYDROCHLORIDE 5 MG/1
10 TABLET ORAL EVERY 4 HOURS PRN
Status: DISPENSED | OUTPATIENT
Start: 2020-04-21 | End: 2020-04-22

## 2020-04-21 RX ADMIN — FENTANYL CITRATE 25 MCG: 50 INJECTION, SOLUTION INTRAMUSCULAR; INTRAVENOUS at 08:04

## 2020-04-21 RX ADMIN — SODIUM CITRATE AND CITRIC ACID MONOHYDRATE 30 ML: 500; 334 SOLUTION ORAL at 07:04

## 2020-04-21 RX ADMIN — FENTANYL CITRATE 10 MCG: 50 INJECTION, SOLUTION INTRAMUSCULAR; INTRAVENOUS at 07:04

## 2020-04-21 RX ADMIN — BUPIVACAINE HYDROCHLORIDE 1.6 ML: 7.5 INJECTION, SOLUTION EPIDURAL; RETROBULBAR at 07:04

## 2020-04-21 RX ADMIN — PHENYLEPHRINE HYDROCHLORIDE 200 MCG: 10 INJECTION INTRAVENOUS at 08:04

## 2020-04-21 RX ADMIN — SODIUM CHLORIDE, SODIUM LACTATE, POTASSIUM CHLORIDE, AND CALCIUM CHLORIDE: .6; .31; .03; .02 INJECTION, SOLUTION INTRAVENOUS at 07:04

## 2020-04-21 RX ADMIN — Medication 95 MILLI-UNITS/MIN: at 10:04

## 2020-04-21 RX ADMIN — FENTANYL CITRATE 40 MCG: 50 INJECTION, SOLUTION INTRAMUSCULAR; INTRAVENOUS at 08:04

## 2020-04-21 RX ADMIN — Medication 0.15 MG: at 07:04

## 2020-04-21 RX ADMIN — MIDAZOLAM HYDROCHLORIDE 1 MG: 1 INJECTION, SOLUTION INTRAMUSCULAR; INTRAVENOUS at 08:04

## 2020-04-21 RX ADMIN — FAMOTIDINE 20 MG: 10 INJECTION INTRAVENOUS at 07:04

## 2020-04-21 RX ADMIN — EPHEDRINE SULFATE 25 MG: 50 INJECTION, SOLUTION INTRAMUSCULAR; INTRAVENOUS; SUBCUTANEOUS at 08:04

## 2020-04-21 RX ADMIN — SODIUM CHLORIDE, SODIUM LACTATE, POTASSIUM CHLORIDE, AND CALCIUM CHLORIDE: .6; .31; .03; .02 INJECTION, SOLUTION INTRAVENOUS at 08:04

## 2020-04-21 RX ADMIN — ACETAMINOPHEN 1000 MG: 10 INJECTION, SOLUTION INTRAVENOUS at 08:04

## 2020-04-21 RX ADMIN — PHENYLEPHRINE HYDROCHLORIDE 200 MCG: 10 INJECTION INTRAVENOUS at 07:04

## 2020-04-21 RX ADMIN — CEFAZOLIN SODIUM 2 G: 2 SOLUTION INTRAVENOUS at 07:04

## 2020-04-22 ENCOUNTER — ANESTHESIA EVENT (OUTPATIENT)
Dept: OBSTETRICS AND GYNECOLOGY | Facility: HOSPITAL | Age: 25
End: 2020-04-22

## 2020-04-22 ENCOUNTER — ANESTHESIA (OUTPATIENT)
Dept: OBSTETRICS AND GYNECOLOGY | Facility: HOSPITAL | Age: 25
End: 2020-04-22

## 2020-04-22 PROBLEM — U07.1 PNEUMONIA DUE TO COVID-19 VIRUS: Status: ACTIVE | Noted: 2020-04-22

## 2020-04-22 PROBLEM — R06.03 RESPIRATORY DISTRESS: Status: ACTIVE | Noted: 2020-04-22

## 2020-04-22 PROBLEM — Z98.891 S/P REPEAT LOW TRANSVERSE C-SECTION: Status: ACTIVE | Noted: 2018-11-17

## 2020-04-22 PROBLEM — J12.82 PNEUMONIA DUE TO COVID-19 VIRUS: Status: ACTIVE | Noted: 2020-04-22

## 2020-04-22 LAB
ALBUMIN SERPL BCP-MCNC: 1.9 G/DL (ref 3.5–5.2)
ALBUMIN SERPL BCP-MCNC: 2.1 G/DL (ref 3.5–5.2)
ALLENS TEST: ABNORMAL
ALP SERPL-CCNC: 265 U/L (ref 55–135)
ALP SERPL-CCNC: 323 U/L (ref 55–135)
ALT SERPL W/O P-5'-P-CCNC: 252 U/L (ref 10–44)
ALT SERPL W/O P-5'-P-CCNC: 357 U/L (ref 10–44)
ANION GAP SERPL CALC-SCNC: 10 MMOL/L (ref 8–16)
ANION GAP SERPL CALC-SCNC: 13 MMOL/L (ref 8–16)
ANISOCYTOSIS BLD QL SMEAR: SLIGHT
AORTIC ROOT ANNULUS: 3.13 CM
AORTIC VALVE CUSP SEPERATION: 1.97 CM
APTT BLDCRRT: 39.3 SEC (ref 21–32)
APTT BLDCRRT: 39.9 SEC (ref 21–32)
AST SERPL-CCNC: 417 U/L (ref 10–40)
AST SERPL-CCNC: 763 U/L (ref 10–40)
AV INDEX (PROSTH): 0.91
AV MEAN GRADIENT: 2 MMHG
AV PEAK GRADIENT: 4 MMHG
AV VALVE AREA: 3.23 CM2
AV VELOCITY RATIO: 0.87
BASOPHILS # BLD AUTO: 0.06 K/UL (ref 0–0.2)
BASOPHILS # BLD AUTO: ABNORMAL K/UL (ref 0–0.2)
BASOPHILS NFR BLD: 0 % (ref 0–1.9)
BASOPHILS NFR BLD: 0.4 % (ref 0–1.9)
BILIRUB SERPL-MCNC: 4.7 MG/DL (ref 0.1–1)
BILIRUB SERPL-MCNC: 6.2 MG/DL (ref 0.1–1)
BNP SERPL-MCNC: <10 PG/ML (ref 0–99)
BSA FOR ECHO PROCEDURE: 1.87 M2
BUN SERPL-MCNC: 6 MG/DL (ref 6–20)
BUN SERPL-MCNC: 6 MG/DL (ref 6–20)
CALCIUM SERPL-MCNC: 8 MG/DL (ref 8.7–10.5)
CALCIUM SERPL-MCNC: 8.1 MG/DL (ref 8.7–10.5)
CHLORIDE SERPL-SCNC: 110 MMOL/L (ref 95–110)
CHLORIDE SERPL-SCNC: 112 MMOL/L (ref 95–110)
CK SERPL-CCNC: 206 U/L (ref 20–180)
CO2 SERPL-SCNC: 20 MMOL/L (ref 23–29)
CO2 SERPL-SCNC: 21 MMOL/L (ref 23–29)
CREAT SERPL-MCNC: 0.9 MG/DL (ref 0.5–1.4)
CREAT SERPL-MCNC: 1 MG/DL (ref 0.5–1.4)
CRP SERPL-MCNC: 47 MG/L (ref 0–8.2)
CV ECHO LV RWT: 0.39 CM
D DIMER PPP IA.FEU-MCNC: 5.59 MG/L FEU
D DIMER PPP IA.FEU-MCNC: 6.18 MG/L FEU
DELSYS: ABNORMAL
DIFFERENTIAL METHOD: ABNORMAL
DIFFERENTIAL METHOD: ABNORMAL
DOP CALC AO PEAK VEL: 0.99 M/S
DOP CALC AO VTI: 14.43 CM
DOP CALC LVOT AREA: 3.6 CM2
DOP CALC LVOT DIAMETER: 2.13 CM
DOP CALC LVOT PEAK VEL: 0.86 M/S
DOP CALC LVOT STROKE VOLUME: 46.66 CM3
DOP CALCLVOT PEAK VEL VTI: 13.1 CM
ECHO LV POSTERIOR WALL: 0.9 CM (ref 0.6–1.1)
EOSINOPHIL # BLD AUTO: 0 K/UL (ref 0–0.5)
EOSINOPHIL # BLD AUTO: ABNORMAL K/UL (ref 0–0.5)
EOSINOPHIL NFR BLD: 0 % (ref 0–8)
EOSINOPHIL NFR BLD: 0 % (ref 0–8)
ERYTHROCYTE [DISTWIDTH] IN BLOOD BY AUTOMATED COUNT: 15.5 % (ref 11.5–14.5)
ERYTHROCYTE [DISTWIDTH] IN BLOOD BY AUTOMATED COUNT: 15.7 % (ref 11.5–14.5)
ERYTHROCYTE [SEDIMENTATION RATE] IN BLOOD BY WESTERGREN METHOD: 43 MM/HR (ref 0–20)
EST. GFR  (AFRICAN AMERICAN): >60 ML/MIN/1.73 M^2
EST. GFR  (AFRICAN AMERICAN): >60 ML/MIN/1.73 M^2
EST. GFR  (NON AFRICAN AMERICAN): >60 ML/MIN/1.73 M^2
EST. GFR  (NON AFRICAN AMERICAN): >60 ML/MIN/1.73 M^2
FERRITIN SERPL-MCNC: 1188 NG/ML (ref 20–300)
FIBRINOGEN PPP-MCNC: 252 MG/DL (ref 182–366)
FIBRINOGEN PPP-MCNC: 252 MG/DL (ref 182–366)
FLOW: 2
FRACTIONAL SHORTENING: 27 % (ref 28–44)
GIANT PLATELETS BLD QL SMEAR: PRESENT
GLUCOSE SERPL-MCNC: 121 MG/DL (ref 70–110)
GLUCOSE SERPL-MCNC: 98 MG/DL (ref 70–110)
HCO3 UR-SCNC: 24 MMOL/L (ref 24–28)
HCT VFR BLD AUTO: 40.6 % (ref 37–48.5)
HCT VFR BLD AUTO: 48.5 % (ref 37–48.5)
HGB BLD-MCNC: 13.1 G/DL (ref 12–16)
HGB BLD-MCNC: 14.9 G/DL (ref 12–16)
IMM GRANULOCYTES # BLD AUTO: 0.71 K/UL (ref 0–0.04)
IMM GRANULOCYTES # BLD AUTO: ABNORMAL K/UL (ref 0–0.04)
IMM GRANULOCYTES NFR BLD AUTO: 4.6 % (ref 0–0.5)
IMM GRANULOCYTES NFR BLD AUTO: ABNORMAL % (ref 0–0.5)
INR PPP: 1.5 (ref 0.8–1.2)
INR PPP: 1.5 (ref 0.8–1.2)
INTERVENTRICULAR SEPTUM: 0.88 CM (ref 0.6–1.1)
IVRT: 100.35 MSEC
LA MAJOR: 4.98 CM
LACTATE SERPL-SCNC: 3.2 MMOL/L (ref 0.5–2.2)
LACTATE SERPL-SCNC: 4.1 MMOL/L (ref 0.5–2.2)
LDH SERPL L TO P-CCNC: 1060 U/L (ref 110–260)
LEFT ATRIUM SIZE: 2.48 CM
LEFT INTERNAL DIMENSION IN SYSTOLE: 3.36 CM (ref 2.1–4)
LEFT VENTRICLE DIASTOLIC VOLUME INDEX: 53.62 ML/M2
LEFT VENTRICLE DIASTOLIC VOLUME: 96.62 ML
LEFT VENTRICLE MASS INDEX: 75 G/M2
LEFT VENTRICLE SYSTOLIC VOLUME INDEX: 25.7 ML/M2
LEFT VENTRICLE SYSTOLIC VOLUME: 46.23 ML
LEFT VENTRICULAR INTERNAL DIMENSION IN DIASTOLE: 4.59 CM (ref 3.5–6)
LEFT VENTRICULAR MASS: 135.19 G
LYMPHOCYTES # BLD AUTO: 4.5 K/UL (ref 1–4.8)
LYMPHOCYTES # BLD AUTO: ABNORMAL K/UL (ref 1–4.8)
LYMPHOCYTES NFR BLD: 26 % (ref 18–48)
LYMPHOCYTES NFR BLD: 29.4 % (ref 18–48)
MAGNESIUM SERPL-MCNC: 2.1 MG/DL (ref 1.6–2.6)
MCH RBC QN AUTO: 28.5 PG (ref 27–31)
MCH RBC QN AUTO: 29.2 PG (ref 27–31)
MCHC RBC AUTO-ENTMCNC: 30.7 G/DL (ref 32–36)
MCHC RBC AUTO-ENTMCNC: 32.3 G/DL (ref 32–36)
MCV RBC AUTO: 90 FL (ref 82–98)
MCV RBC AUTO: 93 FL (ref 82–98)
METAMYELOCYTES NFR BLD MANUAL: 1 %
MODE: ABNORMAL
MONOCYTES # BLD AUTO: 0.6 K/UL (ref 0.3–1)
MONOCYTES # BLD AUTO: ABNORMAL K/UL (ref 0.3–1)
MONOCYTES NFR BLD: 2 % (ref 4–15)
MONOCYTES NFR BLD: 3.7 % (ref 4–15)
MV PEAK E VEL: 1.04 M/S
MYELOCYTES NFR BLD MANUAL: 1 %
NEUTROPHILS # BLD AUTO: 9.4 K/UL (ref 1.8–7.7)
NEUTROPHILS # BLD AUTO: ABNORMAL K/UL (ref 1.8–7.7)
NEUTROPHILS NFR BLD: 61.9 % (ref 38–73)
NEUTROPHILS NFR BLD: 66 % (ref 38–73)
NEUTS BAND NFR BLD MANUAL: 4 %
NRBC BLD-RTO: 12 /100 WBC
NRBC BLD-RTO: 7 /100 WBC
PCO2 BLDA: 39.9 MMHG (ref 35–45)
PH SMN: 7.39 [PH] (ref 7.35–7.45)
PHOSPHATE SERPL-MCNC: 3.2 MG/DL (ref 2.7–4.5)
PISA TR MAX VEL: 1.76 M/S
PLATELET # BLD AUTO: 393 K/UL (ref 150–350)
PLATELET # BLD AUTO: 447 K/UL (ref 150–350)
PLATELET BLD QL SMEAR: ABNORMAL
PMV BLD AUTO: 10.3 FL (ref 9.2–12.9)
PMV BLD AUTO: 10.5 FL (ref 9.2–12.9)
PO2 BLDA: 62 MMHG (ref 80–100)
POC BE: -1 MMOL/L
POC SATURATED O2: 91 % (ref 95–100)
POC TCO2: 25 MMOL/L (ref 23–27)
POLYCHROMASIA BLD QL SMEAR: ABNORMAL
POTASSIUM SERPL-SCNC: 4.1 MMOL/L (ref 3.5–5.1)
POTASSIUM SERPL-SCNC: 4.2 MMOL/L (ref 3.5–5.1)
PROCALCITONIN SERPL IA-MCNC: 1.28 NG/ML
PROT SERPL-MCNC: 5.5 G/DL (ref 6–8.4)
PROT SERPL-MCNC: 6.2 G/DL (ref 6–8.4)
PROTHROMBIN TIME: 16.2 SEC (ref 9–12.5)
PROTHROMBIN TIME: 16.7 SEC (ref 9–12.5)
PV PEAK VELOCITY: 0.85 CM/S
RA MAJOR: 5.05 CM
RBC # BLD AUTO: 4.49 M/UL (ref 4–5.4)
RBC # BLD AUTO: 5.22 M/UL (ref 4–5.4)
SAMPLE: ABNORMAL
SINUS: 2.94 CM
SITE: ABNORMAL
SODIUM SERPL-SCNC: 143 MMOL/L (ref 136–145)
SODIUM SERPL-SCNC: 143 MMOL/L (ref 136–145)
SPHEROCYTES BLD QL SMEAR: ABNORMAL
STJ: 2.67 CM
TOXIC GRANULES BLD QL SMEAR: PRESENT
TR MAX PG: 12 MMHG
TROPONIN I SERPL DL<=0.01 NG/ML-MCNC: 0.01 NG/ML (ref 0–0.03)
WBC # BLD AUTO: 14.22 K/UL (ref 3.9–12.7)
WBC # BLD AUTO: 15.27 K/UL (ref 3.9–12.7)

## 2020-04-22 PROCEDURE — 36000686 HC CESAREAN SECTION LEVEL II

## 2020-04-22 PROCEDURE — 63700000 PHARM REV CODE 250 ALT 637 W/O HCPCS: Performed by: HOSPITALIST

## 2020-04-22 PROCEDURE — 99233 SBSQ HOSP IP/OBS HIGH 50: CPT | Mod: ,,, | Performed by: OBSTETRICS & GYNECOLOGY

## 2020-04-22 PROCEDURE — 25000003 PHARM REV CODE 250: Performed by: ANESTHESIOLOGY

## 2020-04-22 PROCEDURE — 99233 PR SUBSEQUENT HOSPITAL CARE,LEVL III: ICD-10-PCS | Mod: ,,, | Performed by: OBSTETRICS & GYNECOLOGY

## 2020-04-22 PROCEDURE — 36600 WITHDRAWAL OF ARTERIAL BLOOD: CPT

## 2020-04-22 PROCEDURE — 85610 PROTHROMBIN TIME: CPT

## 2020-04-22 PROCEDURE — 36415 COLL VENOUS BLD VENIPUNCTURE: CPT

## 2020-04-22 PROCEDURE — 82803 BLOOD GASES ANY COMBINATION: CPT

## 2020-04-22 PROCEDURE — 25000003 PHARM REV CODE 250: Performed by: OBSTETRICS & GYNECOLOGY

## 2020-04-22 PROCEDURE — 63600175 PHARM REV CODE 636 W HCPCS: Performed by: HOSPITALIST

## 2020-04-22 PROCEDURE — 80053 COMPREHEN METABOLIC PANEL: CPT

## 2020-04-22 PROCEDURE — 83605 ASSAY OF LACTIC ACID: CPT

## 2020-04-22 PROCEDURE — 63600175 PHARM REV CODE 636 W HCPCS: Performed by: OBSTETRICS & GYNECOLOGY

## 2020-04-22 PROCEDURE — 11000001 HC ACUTE MED/SURG PRIVATE ROOM

## 2020-04-22 PROCEDURE — 85730 THROMBOPLASTIN TIME PARTIAL: CPT

## 2020-04-22 PROCEDURE — 85379 FIBRIN DEGRADATION QUANT: CPT

## 2020-04-22 PROCEDURE — 85384 FIBRINOGEN ACTIVITY: CPT

## 2020-04-22 PROCEDURE — 27000221 HC OXYGEN, UP TO 24 HOURS

## 2020-04-22 PROCEDURE — 99900035 HC TECH TIME PER 15 MIN (STAT)

## 2020-04-22 PROCEDURE — 85025 COMPLETE CBC W/AUTO DIFF WBC: CPT

## 2020-04-22 RX ORDER — FUROSEMIDE 10 MG/ML
20 INJECTION INTRAMUSCULAR; INTRAVENOUS ONCE
Status: COMPLETED | OUTPATIENT
Start: 2020-04-22 | End: 2020-04-22

## 2020-04-22 RX ORDER — AZITHROMYCIN 250 MG/1
500 TABLET, FILM COATED ORAL ONCE
Status: COMPLETED | OUTPATIENT
Start: 2020-04-22 | End: 2020-04-22

## 2020-04-22 RX ORDER — AZITHROMYCIN 250 MG/1
250 TABLET, FILM COATED ORAL DAILY
Status: DISCONTINUED | OUTPATIENT
Start: 2020-04-23 | End: 2020-04-25 | Stop reason: HOSPADM

## 2020-04-22 RX ADMIN — OXYCODONE 10 MG: 5 TABLET ORAL at 04:04

## 2020-04-22 RX ADMIN — SODIUM CHLORIDE, SODIUM LACTATE, POTASSIUM CHLORIDE, AND CALCIUM CHLORIDE: .6; .31; .03; .02 INJECTION, SOLUTION INTRAVENOUS at 02:04

## 2020-04-22 RX ADMIN — PRENATAL VIT W/ FE FUMARATE-FA TAB 27-0.8 MG 1 TABLET: 27-0.8 TAB at 08:04

## 2020-04-22 RX ADMIN — MUPIROCIN: 20 OINTMENT TOPICAL at 08:04

## 2020-04-22 RX ADMIN — CEFTRIAXONE 1 G: 1 INJECTION, SOLUTION INTRAVENOUS at 08:04

## 2020-04-22 RX ADMIN — SODIUM CHLORIDE, SODIUM LACTATE, POTASSIUM CHLORIDE, AND CALCIUM CHLORIDE: .6; .31; .03; .02 INJECTION, SOLUTION INTRAVENOUS at 08:04

## 2020-04-22 RX ADMIN — DOCUSATE SODIUM 200 MG: 100 CAPSULE, LIQUID FILLED ORAL at 08:04

## 2020-04-22 RX ADMIN — AZITHROMYCIN MONOHYDRATE 500 MG: 250 TABLET ORAL at 08:04

## 2020-04-22 RX ADMIN — ACETAMINOPHEN 650 MG: 325 TABLET ORAL at 08:04

## 2020-04-22 RX ADMIN — ACETAMINOPHEN 650 MG: 325 TABLET ORAL at 02:04

## 2020-04-22 RX ADMIN — DOCUSATE SODIUM 200 MG: 100 CAPSULE, LIQUID FILLED ORAL at 10:04

## 2020-04-22 RX ADMIN — MUPIROCIN: 20 OINTMENT TOPICAL at 09:04

## 2020-04-22 RX ADMIN — ACETAMINOPHEN 650 MG: 325 TABLET ORAL at 04:04

## 2020-04-22 RX ADMIN — FUROSEMIDE 20 MG: 10 INJECTION, SOLUTION INTRAMUSCULAR; INTRAVENOUS at 10:04

## 2020-04-22 RX ADMIN — OXYCODONE HYDROCHLORIDE 5 MG: 5 TABLET ORAL at 10:04

## 2020-04-22 NOTE — PROGRESS NOTES
[facility] Hospital Medicine  Progress Note    Patient Name: Radha Akbar  MRN: [id number]  Patient Class: IP- Inpatient   Admission Date: 2020  Length of Stay: 1 days  Attending Physician: Harpreet Melgoza MD  Primary Care Provider: Primary Doctor No        Subjective:     Principal Problem:COVID-19 virus infection        HPI:  24-year-old female status post  tonight 38 weeks gestational age found to be covert positive.  Patient has been sick for a week now.  She denies any shortness of breath nausea vomiting or diarrhea.  Patient being referred for consultation for her COVID positive state.       Overview/Hospital Course:  24-year-old female status post  tonight 38 weeks gestational age found to be covert positive.  Patient has been sick for a week now.  She denies any shortness of breath nausea vomiting or diarrhea.  Patient being referred for consultation for her COVID positive state.chest x ray consistent with viral  Pneumonia,started on Rocephin,zithromax,stable on NC O 2,prn IV lasix,    Past Medical History:   Diagnosis Date    Anemia     Depression     Endometriosis of uterus 2020    diagnosed during        Past Surgical History:   Procedure Laterality Date     SECTION       SECTION N/A 2019    Procedure:  SECTION;  Surgeon: Ascencion Fajardo MD;  [facility];  Service: OB/GYN;  Laterality: N/A;       Review of patient's allergies indicates:  No Known Allergies    No current facility-administered medications on file prior to encounter.      Current Outpatient Medications on File Prior to Encounter   Medication Sig    acetaminophen (TYLENOL) 650 MG TbSR Take 1 tablet (650 mg total) by mouth 3 (three) times daily as needed (pain).    ondansetron (ZOFRAN-ODT) 4 MG TbDL DISSOLVE 1 T UNDER THE TONGUE Q 6 H PRN    ondansetron (ZOFRAN-ODT) 4 MG TbDL Take 1 tablet (4 mg total) by mouth every 6 (six) hours as  needed (nausea).    PRENATAL VITAMIN 27 mg iron- 0.8 mg Tab TK 1 T PO D     Family History     Problem Relation (Age of Onset)    Diabetes Mother        Tobacco Use    Smoking status: Never Smoker    Smokeless tobacco: Never Used   Substance and Sexual Activity    Alcohol use: No     Frequency: Never    Drug use: No    Sexual activity: Yes     Partners: Male     Review of Systems   All other systems reviewed and are negative.    Objective:     Vital Signs (Most Recent):  Temp: 98.7 °F (37.1 °C) (04/22/20 0848)  Pulse: 110 (04/22/20 0831)  Resp: 20 (04/22/20 0715)  BP: 117/76 (04/22/20 0831)  SpO2: 97 % (04/22/20 0715) Vital Signs (24h Range):  Temp:  [97.7 °F (36.5 °C)-98.7 °F (37.1 °C)] 98.7 °F (37.1 °C)  Pulse:  [] 110  Resp:  [18-20] 20  SpO2:  [83 %-98 %] 97 %  BP: (104-138)/(56-85) 117/76        There is no height or weight on file to calculate BMI.    Physical Exam   Constitutional: She is oriented to person, place, and time. She appears well-developed and well-nourished. No distress.   HENT:   Head: Normocephalic.   Mouth/Throat: No oropharyngeal exudate.   Eyes: Pupils are equal, round, and reactive to light. Conjunctivae and EOM are normal.   Neck: Normal range of motion. Neck supple. No JVD present.   Cardiovascular: Normal rate, regular rhythm and normal heart sounds. Exam reveals no gallop and no friction rub.   No murmur heard.  Pulmonary/Chest: Effort normal and breath sounds normal. No respiratory distress. She has no wheezes. She has no rales.   Abdominal: Soft. Bowel sounds are normal. She exhibits no distension. There is no tenderness. There is no guarding.   Musculoskeletal: Normal range of motion. She exhibits no edema, tenderness or deformity.   Neurological: She is alert and oriented to person, place, and time. She displays normal reflexes. No cranial nerve deficit or sensory deficit. She exhibits normal muscle tone. Coordination normal.   Skin: Skin is warm. Capillary refill takes  less than 2 seconds. No rash noted. She is not diaphoretic. No erythema.   Psychiatric: She has a normal mood and affect. Her behavior is normal. Judgment and thought content normal.       Significant Labs: All pertinent labs within the past 24 hours have been reviewed.    Significant Imaging: I have reviewed and interpreted all pertinent imaging results/findings within the past 24 hours.      Assessment/Plan:      * COVID-19 virus infection    Patient currently with normal vital signs and O2 sats normal on room air.  No respiratory significant symptoms at this time continue supportive care.  Placed on contact and airborne precautions.  Check baseline COVID labs.  However does not appear to be significantly ill from the Coronavirus over a week out from symptoms.  chest x ray consistent with viral  Pneumonia,started on Rocephin,zithromax,stable on NC O 2,prn IV lasix,    Pneumonia due to COVID-19 virus    chest x ray consistent with viral  Pneumonia,started on Rocephin,zithromax,stable on NC O 2,prn IV lasix,    Hx of  section  Per Ob      Intrauterine pregnancy  S/P C- section,per OB,        VTE Risk Mitigation (From admission, onward)         Ordered     Place sequential compression device  Until discontinued      20     IP VTE LOW RISK PATIENT  Once      20                      Shan Kohli MD  Department of Hospital Medicine   [facility]

## 2020-04-22 NOTE — SUBJECTIVE & OBJECTIVE
Obstetric HPI:  Patient reports painful contractions, active fetal movement, No vaginal bleeding , No loss of fluid     This pregnancy has been complicated by h/o CD x 2    OB History    Para Term  AB Living   4 2 2 0 1 2   SAB TAB Ectopic Multiple Live Births   1 0 0 0 2      # Outcome Date GA Lbr Oleksandr/2nd Weight Sex Delivery Anes PTL Lv   4 Current            3 Term 19 39w0d  4.216 kg (9 lb 4.7 oz) M CS-LTranv Spinal N ALMA      Name: RYLEY HENSLEY      Apgar1: 9  Apgar5: 9   2 Term 11/10/11 40w0d   M CS-Unspec   ALMA   1 SAB              Past Medical History:   Diagnosis Date    Anemia     Depression      Past Surgical History:   Procedure Laterality Date     SECTION       SECTION N/A 2019    Procedure:  SECTION;  Surgeon: Ascencion Fajardo MD;  Location: [facility] L&D;  Service: OB/GYN;  Laterality: N/A;       PTA Medications   Medication Sig    acetaminophen (TYLENOL) 650 MG TbSR Take 1 tablet (650 mg total) by mouth 3 (three) times daily as needed (pain).    ondansetron (ZOFRAN-ODT) 4 MG TbDL DISSOLVE 1 T UNDER THE TONGUE Q 6 H PRN    ondansetron (ZOFRAN-ODT) 4 MG TbDL Take 1 tablet (4 mg total) by mouth every 6 (six) hours as needed (nausea).    PRENATAL VITAMIN 27 mg iron- 0.8 mg Tab TK 1 T PO D       Review of patient's allergies indicates:  No Known Allergies     Family History     Problem Relation (Age of Onset)    Diabetes Mother        Tobacco Use    Smoking status: Never Smoker    Smokeless tobacco: Never Used   Substance and Sexual Activity    Alcohol use: No     Frequency: Never    Drug use: No    Sexual activity: Yes     Partners: Male     Review of Systems   Constitutional: Negative for appetite change, chills and fever.   Respiratory: Positive for cough and shortness of breath.    Cardiovascular: Negative for chest pain.   Gastrointestinal: Negative for abdominal pain, blood in stool, constipation, diarrhea, nausea and vomiting.    Endocrine: Negative for hair loss and hot flashes.   Genitourinary: Negative for decreased libido, dysmenorrhea, dyspareunia, dysuria, genital sores, menorrhagia, menstrual problem, pelvic pain, vaginal discharge, vaginal pain, urinary incontinence and vaginal odor.   Musculoskeletal: Negative for back pain.   Integumentary:  Negative for rash, acne, hair changes, breast mass, nipple discharge and breast skin changes.   All other systems reviewed and are negative.  Breast: Negative for mass, mastodynia, nipple discharge and skin changes     Objective:     Vital Signs (Most Recent):  Temp: 98 °F (36.7 °C) (04/21/20 1725)  Pulse: (!) 125 (04/21/20 1930)  BP: 122/72 (04/21/20 1912)  SpO2: 96 % (04/21/20 1929) Vital Signs (24h Range):  Temp:  [98 °F (36.7 °C)] 98 °F (36.7 °C)  Pulse:  [120-141] 125  SpO2:  [83 %-96 %] 96 %  BP: (112-122)/(68-80) 122/72        There is no height or weight on file to calculate BMI.    FHT: 155 bpm, min-mod, +accels, repetitive intermittent late decels on presentation but now with repetitive late decels.  Cat 3 (non-reassuring)  TOCO:  Q 2 minutes    Physical Exam:   Constitutional: She is oriented to person, place, and time. She appears well-developed and well-nourished.    HENT:   Head: Normocephalic and atraumatic.    Eyes: Pupils are equal, round, and reactive to light. EOM are normal.     Cardiovascular: Exam reveals no clubbing and no cyanosis.     Pulmonary/Chest: Effort normal.        Abdominal: Soft. She exhibits no mass. There is no rebound and no guarding. No hernia.   Gravid               Musculoskeletal: Normal range of motion and moves all extremeties.       Neurological: She is alert and oriented to person, place, and time.    Skin: Skin is warm. No cyanosis. Nails show no clubbing.    Psychiatric: She has a normal mood and affect. Her behavior is normal. Thought content normal.       Cervix:  Dilation:  1  Effacement:  90  Station: -3  Presentation: Vertex     Significant  Labs:  Lab Results   Component Value Date    GROUPTRH O POS 04/15/2020    HEPBSAG Negative 10/03/2019    STREPBCULT No Group B Streptococcus isolated 04/08/2020       I have personallly reviewed all pertinent lab results from the last 24 hours.

## 2020-04-22 NOTE — HPI
24-year-old female status post  tonight 38 weeks gestational age found to be covert positive.  Patient has been sick for a week now.  She denies any shortness of breath nausea vomiting or diarrhea.  Patient being referred for consultation for her COVID positive state.

## 2020-04-22 NOTE — ANESTHESIA PREPROCEDURE EVALUATION
2020    Pre-operative evaluation for Procedure(s) (LRB):   SECTION (N/A)    Radha Akbar is a 24 y.o. female     Patient Active Problem List   Diagnosis    Pregnancy    Hx of  section    Hx of acute pyelonephritis    Screening examination for sexually transmitted disease    Acute blood loss anemia    Abdominal pain affecting pregnancy       Review of patient's allergies indicates:  No Known Allergies    No current facility-administered medications on file prior to encounter.      Current Outpatient Medications on File Prior to Encounter   Medication Sig Dispense Refill    acetaminophen (TYLENOL) 650 MG TbSR Take 1 tablet (650 mg total) by mouth 3 (three) times daily as needed (pain). 20 tablet 0    ondansetron (ZOFRAN-ODT) 4 MG TbDL DISSOLVE 1 T UNDER THE TONGUE Q 6 H PRN  0    ondansetron (ZOFRAN-ODT) 4 MG TbDL Take 1 tablet (4 mg total) by mouth every 6 (six) hours as needed (nausea). 14 tablet 0    PRENATAL VITAMIN 27 mg iron- 0.8 mg Tab TK 1 T PO D         Past Surgical History:   Procedure Laterality Date     SECTION       SECTION N/A 2019    Procedure:  SECTION;  Surgeon: Ascencion Fajardo MD;  Location: [facility] L&D;  Service: OB/GYN;  Laterality: N/A;       Anesthesia Evaluation    I have reviewed the Patient Summary Reports.     I have reviewed the Medications.     Review of Systems  Anesthesia Hx:  No problems with previous Anesthesia  History of prior surgery of interest to airway management or planning: Denies Family Hx of Anesthesia complications.   Denies Personal Hx of Anesthesia complications.   Social:  Non-Smoker    Hematology/Oncology:  Hematology Normal   Oncology Normal     EENT/Dental:EENT/Dental Normal   Cardiovascular:  Cardiovascular Normal Exercise tolerance: good  Denies Hypertension.     Pulmonary:   COVID-19 positive   Hepatic/GI:  Hepatic/GI Normal    Neurological:  Neurology Normal    Endocrine:  Endocrine Normal     Psych:   Psychiatric History depression          Physical Exam  General:  Well nourished    Airway/Jaw/Neck:  Airway Findings: Mouth Opening: Normal Tongue: Normal  General Airway Assessment: Adult  Mallampati: II  Jaw/Neck Findings:  Neck ROM: Normal ROM      Dental:  Dental Findings: Periodontal disease, Mild    Heart/Vascular:  Heart Findings: Rate: Normal        Mental Status:  Mental Status Findings:  Cooperative, Alert and Oriented         Anesthesia Plan  Type of Anesthesia, risks & benefits discussed:  Anesthesia Type:  spinal  Patient's Preference:   Intra-op Monitoring Plan: standard ASA monitors  Intra-op Monitoring Plan Comments:   Post Op Pain Control Plan: per primary service following discharge from PACU  Post Op Pain Control Plan Comments:   Induction:   IV  Beta Blocker:  Patient is not currently on a Beta-Blocker (No further documentation required).       Informed Consent: Patient understands risks and agrees with Anesthesia plan.  Questions answered. Anesthesia consent signed with patient.  ASA Score: 2     Day of Surgery Review of History & Physical: I have interviewed and examined the patient. I have reviewed the patient's H&P dated:  There are no significant changes.          Ready For Surgery From Anesthesia Perspective.

## 2020-04-22 NOTE — PLAN OF CARE
POC reviewed with pt, pt verb understanding.   Kazakh speaking only.  O2 sats low 90's on RA, pt currently on 2L O2 NC.

## 2020-04-22 NOTE — HPI
Pt is a 25 yo  @ 38w1d with h/o prior CD x 2 presented to L&D with painful contractions.  Pt tested positive for covid 19 on 20, pt has had SOB and cough.  Unknown if she has fever since she took tylenol about 5 hours ago

## 2020-04-22 NOTE — SUBJECTIVE & OBJECTIVE
Hospital course: Pt is jose d every 2 minute, cervix is 1/90/-3  04/22/2020:Patient continues to have SOB and cough. She is currently on 2L of O2. Chest her with newly diagnosed pneumonia along with COVID-19+. Repeat labs pending. She was started on Rocephin and Azithromycin. Infant in NICU. Postop pain is controlled.     Interval History:   Patient continues to report coughing but denies fever and chills. She reports SOB that improved with oxygen.. She reports abdominal and incisional pain.  She is doing well this morning. She is tolerating a regular diet without nausea or vomiting. Patient has turcios catheter in place draining concentrated urine. Lochia is normal. Infant is NICU.    Objective:     Vital Signs (Most Recent):  Temp: 98.4 °F (36.9 °C) (04/22/20 1159)  Pulse: (!) 112 (04/22/20 1231)  Resp: 20 (04/22/20 1159)  BP: 112/72 (04/22/20 1231)  SpO2: (!) 94 % (04/22/20 1231) Vital Signs (24h Range):  Temp:  [97.7 °F (36.5 °C)-98.7 °F (37.1 °C)] 98.4 °F (36.9 °C)  Pulse:  [] 112  Resp:  [18-20] 20  SpO2:  [83 %-98 %] 94 %  BP: (104-138)/(56-85) 112/72     Weight: 81.2 kg (179 lb)  Body mass index is 33.82 kg/m².      Intake/Output Summary (Last 24 hours) at 4/22/2020 1344  Last data filed at 4/22/2020 1200  Gross per 24 hour   Intake 1600 ml   Output 3860 ml   Net -2260 ml       Significant Labs:  Lab Results   Component Value Date    GROUPTRH O POS 04/21/2020    HEPBSAG Negative 10/03/2019    STREPBCULT No Group B Streptococcus isolated 04/08/2020     Recent Labs   Lab 04/22/20  0935   HGB 13.1   HCT 40.6       CBC:   Recent Labs   Lab 04/22/20  0935   WBC 15.27*   RBC 4.49   HGB 13.1   HCT 40.6   *   MCV 90   MCH 29.2   MCHC 32.3     I have personallly reviewed all pertinent lab results from the last 24 hours.    Physical Exam:   Constitutional: She is oriented to person, place, and time. She appears well-developed and well-nourished.       Cardiovascular: Normal rate.     Pulmonary/Chest:  Effort normal. No respiratory distress.        Abdominal: Soft. She exhibits abdominal incision. She exhibits no distension.   Appropriately tender     Genitourinary:   Genitourinary Comments: Uterus at umbilicus               Neurological: She is alert and oriented to person, place, and time.    Skin: Skin is warm and dry.    Psychiatric: She has a normal mood and affect.

## 2020-04-22 NOTE — NURSING
Report received from AUBREY Guido RN; care assumed. AAOx3, assessment completed. C/o incisional pain, rates pain 6/10 using pain scale. Right AC IV patent, infusing LR @ 125 cc/h without difficulty, no redness or swelling. Urimeter present, draining dark yellow urine, 150 cc drained from  bag. Pt is currently on 2L O2 NC, sats 96%. Temp 98.7. Arabic speaking only. POC reviewed with pt, pt verb understanding. Call bell within reach, will monitor. NAD.     0759- Update given to Dr. Maren MD agve order to transfer care to PP when nurse is available. MD also gave order to try to wean pt off of O2 if possible.     0930- Report given to Dr. Patterson with recent labs, cxr results and o2 sats on 2L NC. MD gave order for d-dimer, pt/ptt, fibrinogen, cbc, bmp, lactic acid, crp, cmp and transfer to ICU.     1012- Lab @ bs. Dr. Maria to bs to speak to pt, echocardiogram in progress @ bs.     1021- Pts mother leaving unit to go home. Explained to mother that she has been exposed since she was in the room this am without being masked. Explained to mother that she now has to be quarantined for 14 days with all in her household and notify MD if any symptoms arise. Mother verb understanding.     1215- Spoke to ICU RN, pt to be transferred to med/surg. Dr. Patterson to order transfer.     1404- Spoke to BARBARA Colón RN concerning pt transfer to med/surg. Left msg for Ziyad to call her back.     1409- Notified AUBREY Golden RN of msg from BARBARA Colón RN.     1547- Notified anesthesia of pts complaint of pain, rates pain 8/10 using pain scale. ERICA Richard CRNA gave order to give pt percocet that's ordered.

## 2020-04-22 NOTE — SUBJECTIVE & OBJECTIVE
Past Medical History:   Diagnosis Date    Anemia     Depression     Endometriosis of uterus 2020    diagnosed during        Past Surgical History:   Procedure Laterality Date     SECTION       SECTION N/A 2019    Procedure:  SECTION;  Surgeon: Ascencion Fajardo MD;  [facility]: [facility];  Service: OB/GYN;  Laterality: N/A;       Review of patient's allergies indicates:  No Known Allergies    No current facility-administered medications on file prior to encounter.      Current Outpatient Medications on File Prior to Encounter   Medication Sig    acetaminophen (TYLENOL) 650 MG TbSR Take 1 tablet (650 mg total) by mouth 3 (three) times daily as needed (pain).    ondansetron (ZOFRAN-ODT) 4 MG TbDL DISSOLVE 1 T UNDER THE TONGUE Q 6 H PRN    ondansetron (ZOFRAN-ODT) 4 MG TbDL Take 1 tablet (4 mg total) by mouth every 6 (six) hours as needed (nausea).    PRENATAL VITAMIN 27 mg iron- 0.8 mg Tab TK 1 T PO D     Family History     Problem Relation (Age of Onset)    Diabetes Mother        Tobacco Use    Smoking status: Never Smoker    Smokeless tobacco: Never Used   Substance and Sexual Activity    Alcohol use: No     Frequency: Never    Drug use: No    Sexual activity: Yes     Partners: Male     Review of Systems   All other systems reviewed and are negative.    Objective:     Vital Signs (Most Recent):  Temp: 98.7 °F (37.1 °C) (20 0848)  Pulse: 110 (20 0831)  Resp: 20 (20 0715)  BP: 117/76 (20 0831)  SpO2: 97 % (20 0715) Vital Signs (24h Range):  Temp:  [97.7 °F (36.5 °C)-98.7 °F (37.1 °C)] 98.7 °F (37.1 °C)  Pulse:  [] 110  Resp:  [18-20] 20  SpO2:  [83 %-98 %] 97 %  BP: (104-138)/(56-85) 117/76        There is no height or weight on file to calculate BMI.    Physical Exam   Constitutional: She is oriented to person, place, and time. She appears well-developed and well-nourished. No distress.   HENT:   Head: Normocephalic.    Mouth/Throat: No oropharyngeal exudate.   Eyes: Pupils are equal, round, and reactive to light. Conjunctivae and EOM are normal.   Neck: Normal range of motion. Neck supple. No JVD present.   Cardiovascular: Normal rate, regular rhythm and normal heart sounds. Exam reveals no gallop and no friction rub.   No murmur heard.  Pulmonary/Chest: Effort normal and breath sounds normal. No respiratory distress. She has no wheezes. She has no rales.   Abdominal: Soft. Bowel sounds are normal. She exhibits no distension. There is no tenderness. There is no guarding.   Musculoskeletal: Normal range of motion. She exhibits no edema, tenderness or deformity.   Neurological: She is alert and oriented to person, place, and time. She displays normal reflexes. No cranial nerve deficit or sensory deficit. She exhibits normal muscle tone. Coordination normal.   Skin: Skin is warm. Capillary refill takes less than 2 seconds. No rash noted. She is not diaphoretic. No erythema.   Psychiatric: She has a normal mood and affect. Her behavior is normal. Judgment and thought content normal.       Significant Labs: All pertinent labs within the past 24 hours have been reviewed.    Significant Imaging: I have reviewed and interpreted all pertinent imaging results/findings within the past 24 hours.

## 2020-04-22 NOTE — SUBJECTIVE & OBJECTIVE
Past Medical History:   Diagnosis Date    Anemia     Depression     Endometriosis of uterus 2020    diagnosed during        Past Surgical History:   Procedure Laterality Date     SECTION       SECTION N/A 2019    Procedure:  SECTION;  Surgeon: Ascencion Fajardo MD;  [facility]: [facility];  Service: OB/GYN;  Laterality: N/A;       Review of patient's allergies indicates:  No Known Allergies    No current facility-administered medications on file prior to encounter.      Current Outpatient Medications on File Prior to Encounter   Medication Sig    acetaminophen (TYLENOL) 650 MG TbSR Take 1 tablet (650 mg total) by mouth 3 (three) times daily as needed (pain).    ondansetron (ZOFRAN-ODT) 4 MG TbDL DISSOLVE 1 T UNDER THE TONGUE Q 6 H PRN    ondansetron (ZOFRAN-ODT) 4 MG TbDL Take 1 tablet (4 mg total) by mouth every 6 (six) hours as needed (nausea).    PRENATAL VITAMIN 27 mg iron- 0.8 mg Tab TK 1 T PO D     Family History     Problem Relation (Age of Onset)    Diabetes Mother        Tobacco Use    Smoking status: Never Smoker    Smokeless tobacco: Never Used   Substance and Sexual Activity    Alcohol use: No     Frequency: Never    Drug use: No    Sexual activity: Yes     Partners: Male     Review of Systems   All other systems reviewed and are negative.    Objective:     Vital Signs (Most Recent):  Temp: 98 °F (36.7 °C) (20 1725)  Pulse: (!) 118 (20 2303)  Resp: 18 (20 2115)  BP: 117/68 (20 2303)  SpO2: 97 % (20 2259) Vital Signs (24h Range):  Temp:  [98 °F (36.7 °C)] 98 °F (36.7 °C)  Pulse:  [] 118  Resp:  [18] 18  SpO2:  [83 %-98 %] 97 %  BP: (104-128)/(63-85) 117/68        There is no height or weight on file to calculate BMI.    Physical Exam   Constitutional: She is oriented to person, place, and time. She appears well-developed and well-nourished. No distress.   HENT:   Head: Normocephalic.   Mouth/Throat: No oropharyngeal  exudate.   Eyes: Pupils are equal, round, and reactive to light. Conjunctivae and EOM are normal.   Neck: Normal range of motion. Neck supple. No JVD present.   Cardiovascular: Normal rate, regular rhythm and normal heart sounds. Exam reveals no gallop and no friction rub.   No murmur heard.  Pulmonary/Chest: Effort normal and breath sounds normal. No respiratory distress. She has no wheezes. She has no rales.   Abdominal: Soft. Bowel sounds are normal. She exhibits no distension. There is no tenderness. There is no guarding.   Musculoskeletal: Normal range of motion. She exhibits no edema, tenderness or deformity.   Neurological: She is alert and oriented to person, place, and time. She displays normal reflexes. No cranial nerve deficit or sensory deficit. She exhibits normal muscle tone. Coordination normal.   Skin: Skin is warm. Capillary refill takes less than 2 seconds. No rash noted. She is not diaphoretic. No erythema.   Psychiatric: She has a normal mood and affect. Her behavior is normal. Judgment and thought content normal.       Significant Labs: All pertinent labs within the past 24 hours have been reviewed.    Significant Imaging: I have reviewed and interpreted all pertinent imaging results/findings within the past 24 hours.

## 2020-04-22 NOTE — NURSING
In OR doing timeout - pt decides she does NOT want a tubal ligation.  Verified with IFRAH  ([id number]).

## 2020-04-22 NOTE — CONSULTS
[facility] Hospital Medicine  Consult Note    Patient Name: Radha Akbar  MRN: [id number]  Admission Date: 2020  Hospital Length of Stay: 0 days  Attending Physician: Jamie Tijerina Mai, MD   Primary Care Provider: Primary Doctor No           Patient information was obtained from patient and ER records.     Consults  Subjective:     Principal Problem: COVID-19 virus infection    Chief Complaint:   Chief Complaint   Patient presents with    Contractions        HPI: 24-year-old female status post  tonight 38 weeks gestational age found to be covert positive.  Patient has been sick for a week now.  She denies any shortness of breath nausea vomiting or diarrhea.  Patient being referred for consultation for her COVID positive state.       Past Medical History:   Diagnosis Date    Anemia     Depression     Endometriosis of uterus 2020    diagnosed during        Past Surgical History:   Procedure Laterality Date     SECTION       SECTION N/A 2019    Procedure:  SECTION;  Surgeon: Ascencion Fajardo MD;  Location: [facility] L&D;  Service: OB/GYN;  Laterality: N/A;       Review of patient's allergies indicates:  No Known Allergies    No current facility-administered medications on file prior to encounter.      Current Outpatient Medications on File Prior to Encounter   Medication Sig    acetaminophen (TYLENOL) 650 MG TbSR Take 1 tablet (650 mg total) by mouth 3 (three) times daily as needed (pain).    ondansetron (ZOFRAN-ODT) 4 MG TbDL DISSOLVE 1 T UNDER THE TONGUE Q 6 H PRN    ondansetron (ZOFRAN-ODT) 4 MG TbDL Take 1 tablet (4 mg total) by mouth every 6 (six) hours as needed (nausea).    PRENATAL VITAMIN 27 mg iron- 0.8 mg Tab TK 1 T PO D     Family History     Problem Relation (Age of Onset)    Diabetes Mother        Tobacco Use    Smoking status: Never Smoker    Smokeless tobacco: Never Used   Substance and Sexual Activity    Alcohol  use: No     Frequency: Never    Drug use: No    Sexual activity: Yes     Partners: Male     Review of Systems   All other systems reviewed and are negative.    Objective:     Vital Signs (Most Recent):  Temp: 98 °F (36.7 °C) (04/21/20 1725)  Pulse: (!) 118 (04/21/20 2303)  Resp: 18 (04/21/20 2115)  BP: 117/68 (04/21/20 2303)  SpO2: 97 % (04/21/20 2259) Vital Signs (24h Range):  Temp:  [98 °F (36.7 °C)] 98 °F (36.7 °C)  Pulse:  [] 118  Resp:  [18] 18  SpO2:  [83 %-98 %] 97 %  BP: (104-128)/(63-85) 117/68        There is no height or weight on file to calculate BMI.    Physical Exam   Constitutional: She is oriented to person, place, and time. She appears well-developed and well-nourished. No distress.   HENT:   Head: Normocephalic.   Mouth/Throat: No oropharyngeal exudate.   Eyes: Pupils are equal, round, and reactive to light. Conjunctivae and EOM are normal.   Neck: Normal range of motion. Neck supple. No JVD present.   Cardiovascular: Normal rate, regular rhythm and normal heart sounds. Exam reveals no gallop and no friction rub.   No murmur heard.  Pulmonary/Chest: Effort normal and breath sounds normal. No respiratory distress. She has no wheezes. She has no rales.   Abdominal: Soft. Bowel sounds are normal. She exhibits no distension. There is no tenderness. There is no guarding.   Musculoskeletal: Normal range of motion. She exhibits no edema, tenderness or deformity.   Neurological: She is alert and oriented to person, place, and time. She displays normal reflexes. No cranial nerve deficit or sensory deficit. She exhibits normal muscle tone. Coordination normal.   Skin: Skin is warm. Capillary refill takes less than 2 seconds. No rash noted. She is not diaphoretic. No erythema.   Psychiatric: She has a normal mood and affect. Her behavior is normal. Judgment and thought content normal.       Significant Labs: All pertinent labs within the past 24 hours have been reviewed.    Significant Imaging: I  have reviewed and interpreted all pertinent imaging results/findings within the past 24 hours.    Assessment/Plan:     * COVID-19 virus infection    Patient currently with normal vital signs and O2 sats normal on room air.  No respiratory significant symptoms at this time continue supportive care.  Placed on contact and airborne precautions.  Check baseline COVID labs.  However does not appear to be significantly ill from the Coronavirus over a week out from symptoms.    Hx of  section  Per Ob      Pregnancy  Per OB      VTE Risk Mitigation (From admission, onward)         Ordered     Place sequential compression device  Until discontinued      20     IP VTE LOW RISK PATIENT  Once      20                    Thank you for your consult. I will follow-up with patient. Please contact us if you have any additional questions.    Sherice Crawford MD  Department of Hospital Medicine   [facility]

## 2020-04-22 NOTE — HOSPITAL COURSE
Pt is jose d every 2 minute, cervix is 1/90/-3  04/22/2020:Patient continues to have SOB and cough. She is currently on 2L of O2. Chest her with newly diagnosed pneumonia along with COVID-19+. Repeat labs pending. She was started on Rocephin and Azithromycin. Infant in NICU. Postop pain is controlled.   4/23/20:  Pt states she feels improved, still on O2 via NC; very minimal ambulation, voiding without problem; tolerating reg diet

## 2020-04-22 NOTE — NURSING
2230 - Called Lab to discuss orders for lab draw from hospitalist - find out how many tubes etc to draw and how. They said they will bring up some blue top tubes for us.    2309 - called lab again because still no blue top tubes.  Again they said they will bring us some.    2315 - Dr Crawford (hospitalist) at bedside.    2325 - called lab again because still no blue top tubes.  Our tech is going to go down to the lab to get the tubes.    2345 - labs drawn and immediately brought down to the lab.    0015 - lab called and said the blue top tube was collected incorrectly - it needs to be filled all the way up.  Will have to recollect.    0100 - redraw done and blood brought down to lab.    0210 - called lab to check on status of d-dimer, pt, ptt.  Was put on hold and then hung up on.  I called back again and was again put on hold for several minutes.    0215 - pt moved to room 235 for comfort. Pads changed, pericare done.  Pt denies SOB at this time.  Temp done 97.7 axillary (pt had just eaten ice).    0245 - called lab again to find out status of pending labs - the  said she is releasing the results now.     0250 - page to Dr Crawford to notify of all lab results.     0300 - Dr Crawford called unit - notified of critical lactate and other lab results are final. MD will review.

## 2020-04-22 NOTE — H&P
[facility]  Obstetrics  History & Physical    Patient Name: Radha Jiménez  MRN: [id number]  Admission Date: 2020  Primary Care Provider: Primary Doctor No    Subjective:     Principal Problem:Hx of  section    History of Present Illness:  Pt is a 23 yo  @ 38w1d with h/o prior CD x 2 presented to L&D with painful contractions.  Pt tested positive for covid 19 on 20, pt has had SOB and cough.  Unknown if she has fever since she took tylenol about 5 hours ago    Obstetric HPI:  Patient reports painful contractions, active fetal movement, No vaginal bleeding , No loss of fluid     This pregnancy has been complicated by h/o CD x 2    OB History    Para Term  AB Living   4 2 2 0 1 2   SAB TAB Ectopic Multiple Live Births   1 0 0 0 2      # Outcome Date GA Lbr Oleksandr/2nd Weight Sex Delivery Anes PTL Lv   4 Current            3 Term 19 39w0d  4.216 kg (9 lb 4.7 oz) M CS-LTranv Spinal N ALMA      Name: DESHAWN JIMÉNEZ, RYLEY LAMB      Apgar1: 9  Apgar5: 9   2 Term 11/10/11 40w0d   M CS-Unspec   ALMA   1 SAB              Past Medical History:   Diagnosis Date    Anemia     Depression      Past Surgical History:   Procedure Laterality Date     SECTION       SECTION N/A 2019    Procedure:  SECTION;  Surgeon: Ascencion Fajardo MD;  Location: [facility] L&D;  Service: OB/GYN;  Laterality: N/A;       PTA Medications   Medication Sig    acetaminophen (TYLENOL) 650 MG TbSR Take 1 tablet (650 mg total) by mouth 3 (three) times daily as needed (pain).    ondansetron (ZOFRAN-ODT) 4 MG TbDL DISSOLVE 1 T UNDER THE TONGUE Q 6 H PRN    ondansetron (ZOFRAN-ODT) 4 MG TbDL Take 1 tablet (4 mg total) by mouth every 6 (six) hours as needed (nausea).    PRENATAL VITAMIN 27 mg iron- 0.8 mg Tab TK 1 T PO D       Review of patient's allergies indicates:  No Known Allergies     Family History     Problem Relation (Age of Onset)    Diabetes Mother        Tobacco  Use    Smoking status: Never Smoker    Smokeless tobacco: Never Used   Substance and Sexual Activity    Alcohol use: No     Frequency: Never    Drug use: No    Sexual activity: Yes     Partners: Male     Review of Systems   Constitutional: Negative for appetite change, chills and fever.   Respiratory: Positive for cough and shortness of breath.    Cardiovascular: Negative for chest pain.   Gastrointestinal: Negative for abdominal pain, blood in stool, constipation, diarrhea, nausea and vomiting.   Endocrine: Negative for hair loss and hot flashes.   Genitourinary: Negative for decreased libido, dysmenorrhea, dyspareunia, dysuria, genital sores, menorrhagia, menstrual problem, pelvic pain, vaginal discharge, vaginal pain, urinary incontinence and vaginal odor.   Musculoskeletal: Negative for back pain.   Integumentary:  Negative for rash, acne, hair changes, breast mass, nipple discharge and breast skin changes.   All other systems reviewed and are negative.  Breast: Negative for mass, mastodynia, nipple discharge and skin changes     Objective:     Vital Signs (Most Recent):  Temp: 98 °F (36.7 °C) (04/21/20 1725)  Pulse: (!) 125 (04/21/20 1930)  BP: 122/72 (04/21/20 1912)  SpO2: 96 % (04/21/20 1929) Vital Signs (24h Range):  Temp:  [98 °F (36.7 °C)] 98 °F (36.7 °C)  Pulse:  [120-141] 125  SpO2:  [83 %-96 %] 96 %  BP: (112-122)/(68-80) 122/72        There is no height or weight on file to calculate BMI.    FHT: 155 bpm, min-mod, +accels, repetitive intermittent late decels on presentation but now with repetitive late decels.  Cat 3 (non-reassuring)  TOCO:  Q 2 minutes    Physical Exam:   Constitutional: She is oriented to person, place, and time. She appears well-developed and well-nourished.    HENT:   Head: Normocephalic and atraumatic.    Eyes: Pupils are equal, round, and reactive to light. EOM are normal.     Cardiovascular: Exam reveals no clubbing and no cyanosis.     Pulmonary/Chest: Effort normal.         Abdominal: Soft. She exhibits no mass. There is no rebound and no guarding. No hernia.   Gravid               Musculoskeletal: Normal range of motion and moves all extremeties.       Neurological: She is alert and oriented to person, place, and time.    Skin: Skin is warm. No cyanosis. Nails show no clubbing.    Psychiatric: She has a normal mood and affect. Her behavior is normal. Thought content normal.       Cervix:  Dilation:  1  Effacement:  90  Station: -3  Presentation: Vertex     Significant Labs:  Lab Results   Component Value Date    GROUPTRH O POS 04/15/2020    HEPBSAG Negative 10/03/2019    STREPBCULT No Group B Streptococcus isolated 2020       I have personallly reviewed all pertinent lab results from the last 24 hours.    Assessment/Plan:     24 y.o. female  at 38w1d for:    COVID-19 virus infection  Pt is symptomatic  O2 sat at low 90s -95      Hx of  section  Plan for repeat CD now  Pt desires BTL and consent previous signed in clinic        Jamie Engel MD  Obstetrics  [facility]

## 2020-04-22 NOTE — L&D DELIVERY NOTE
[facility]   Section   Operative Note    SUMMARY     Date of Procedure: 2020     Procedure: Procedure(s) (LRB):   SECTION (N/A)    Surgeon(s) and Role:     * Piedad-Agapito Tijerina Mai, MD - Primary    Assisting Surgeon: Dr. Hubert Cain    Pre-Operative Diagnosis: 1. H/o CD x 2   2. In active labor  3.  Non-reassuring FHT with repetitive late decelerations  4.  38 week gestation   5.  covid infection      Post-Operative Diagnosis: same, delivered.  6.  Endometriosis  7. Uterine window    Anesthesia: Spinal/Epidural    Technical Procedures Used: repeat low transverse  delivery with vertical incision     Complications: No    Blood Loss: * No values recorded between 2020 12:00 AM and 2020  8:56 PM *     With patient in supine position, the legs are  and Savage Catheter placed and positioning to supine done.   Abdomen prepped with Chloroprep and 3 minute drying time allowed prior to draping of the abdomen.   Time out taken with OR team members.  Midlinevertical Incision made through the skin using the old scar.  Keloid was removed, vertical fascial incision developed, rectus muscles  in the midline and the peritoneum entered.   no adhesions noted.  The lower uterine segment and position of the fetus identified.   There was a 2 cm paper thin window in the left lower uterine segment.    Low Transverse Incision made through well developer lower uterine segment and extended laterally with blunt dissection.   thinmeconium fluid noted.  Infant delivered from vertex presentation.  The baby was without tone and appeared cyanotic.  Cord clamped immediately and  handed to attending nurse.  Cord blood taken, placenta delivered. Cord pH obtained  The uterus was exteriorized.  The edges of the uterine incision are grasped with De clamps at the angles and the inferior and superior midline edges of the incision.    Closure with running lock 0 monocryl,  starting at the left angle and tied at the right angle.    An imbricating layer was done.    Observation for bleeding with suture of any bleeding along the hysterotomy line.   With good hemostasis noted, the anterior pelvis is rinsed with sterile saline.   Clear Endometriosis implants noted on uterine serosa.   Right and left adnexa with normal anatomy besides a 2 cm simple cyst on her right fallopian tube, this was drained.     The fascial closure with 0 PDS starting at each angle and tying the knot at the midline.  The subcutaneous tissue was reapproximated  Skin closure with insorb stapler with dermabond.          Specimens:   Specimen (12h ago, onward)    None          Condition: Good    Disposition: PACU - hemodynamically stable.    Attestation: Good         Delivery Information for Zuhair Akbar    Birth information:  YOB: 2020   Time of birth: 8:10 PM   Sex: female   Head Delivery Date/Time: 2020  8:10 PM   Delivery type:    Gestational Age: 38w1d    Delivery Providers    Delivering clinician:             Measurements    Weight:    Length:           Apgars    Living status:    Apgars:   1 min.:   5 min.:   10 min.:   15 min.:   20 min.:     Skin color:          Heart rate:          Reflex irritability:          Muscle tone:          Respiratory effort:          Total:                                 Interventions/Resuscitation         Cord    No data filed        Placenta    Placenta delivery date/time:    Placenta removal:             Labor Events:       labor:       Labor Onset Date/Time:         Dilation Complete Date/Time:         Start Pushing Date/Time:         Start Pushing Date/Time:       Rupture Date/Time:              Rupture type:           Fluid Amount:        Fluid Color:        Fluid Odor:        Membrane Status:                 steroids:       Antibiotics given for GBS:       Induction:       Indications for induction:        Augmentation:        Indications for augmentation:       Labor complications:       Additional complications:          Cervical ripening:                     Delivery:      Episiotomy:       Indication for Episiotomy:       Perineal Lacerations:   Repaired:      Periurethral Laceration:   Repaired:     Labial Laceration:   Repaired:     Sulcus Laceration:   Repaired:     Vaginal Laceration:   Repaired:     Cervical Laceration:   Repaired:     Repair suture:       Repair # of packets:       Last Value - EBL - Nursing (mL):       Sum - EBL - Nursing (mL): 0     Last Value - EBL - Anesthesia (mL):      Calculated QBL (mL):        Vaginal Sweep Performed:       Surgicount Correct:         Other providers:            Details (if applicable):  Trial of Labor      Categorization:      Priority:     Indications for :     Incision Type:       Additional  information:  Forceps:    Vacuum:    Breech:    Observed anomalies    Other (Comments):

## 2020-04-22 NOTE — HOSPITAL COURSE
24-year-old female status post  tonight 38 weeks gestational age found to be covert positive.  Patient has been sick for a week now.  She denies any shortness of breath nausea vomiting or diarrhea.  Patient being referred for consultation for her COVID positive state.chest x ray consistent with viral  Pneumonia,started on Rocephin,zithromax,stable on NC O 2,prn IV lasix,  She was stable on 2 liter Nc O 2,leg US show no DVT.  Patient is table on RA,afebrile,stable for discharge from  stand point regarding COVID,no need for Abx,

## 2020-04-22 NOTE — ASSESSMENT & PLAN NOTE
chest x ray consistent with viral  Pneumonia,started on Rocephin,zithromax,stable on NC O 2,prn IV lasix,

## 2020-04-22 NOTE — ASSESSMENT & PLAN NOTE
Patient currently with normal vital signs and O2 sats normal on room air.  No respiratory significant symptoms at this time continue supportive care.  Placed on contact and airborne precautions.  Check baseline COVID labs.  However does not appear to be significantly ill from the Coronavirus over a week out from symptoms.  chest x ray consistent with viral  Pneumonia,started on Rocephin,zithromax,stable on NC O 2,prn IV lasix,

## 2020-04-22 NOTE — ASSESSMENT & PLAN NOTE
Patient currently with normal vital signs and O2 sats normal on room air.  No respiratory significant symptoms at this time continue supportive care.  Placed on contact and airborne precautions.  Check baseline COVID labs.  However does not appear to be significantly ill from the Coronavirus over a week out from symptoms.

## 2020-04-22 NOTE — PLAN OF CARE
Pt changed her decision  In  The OR, She stated she no longer desired sterilization with tubal ligation.  Lay was used for .

## 2020-04-22 NOTE — PROGRESS NOTES
Pt arrived to [facility] from L&D, IVF infusing, mesh underwear and pads noted to abd incision, POC explained to pt, safety maintained, call light in reach, bed alarm audible

## 2020-04-23 PROBLEM — Z98.891 HX OF CESAREAN SECTION: Status: RESOLVED | Noted: 2019-02-28 | Resolved: 2020-04-23

## 2020-04-23 PROBLEM — R79.89 ELEVATED LFTS: Status: ACTIVE | Noted: 2020-04-23

## 2020-04-23 PROCEDURE — 25000003 PHARM REV CODE 250: Performed by: OBSTETRICS & GYNECOLOGY

## 2020-04-23 PROCEDURE — 63700000 PHARM REV CODE 250 ALT 637 W/O HCPCS: Performed by: HOSPITALIST

## 2020-04-23 PROCEDURE — 99232 SBSQ HOSP IP/OBS MODERATE 35: CPT | Mod: ,,, | Performed by: OBSTETRICS & GYNECOLOGY

## 2020-04-23 PROCEDURE — 99232 PR SUBSEQUENT HOSPITAL CARE,LEVL II: ICD-10-PCS | Mod: ,,, | Performed by: OBSTETRICS & GYNECOLOGY

## 2020-04-23 PROCEDURE — 11000001 HC ACUTE MED/SURG PRIVATE ROOM

## 2020-04-23 PROCEDURE — 25000003 PHARM REV CODE 250: Performed by: ANESTHESIOLOGY

## 2020-04-23 PROCEDURE — 63600175 PHARM REV CODE 636 W HCPCS: Performed by: HOSPITALIST

## 2020-04-23 RX ADMIN — OXYCODONE HYDROCHLORIDE AND ACETAMINOPHEN 1 TABLET: 5; 325 TABLET ORAL at 09:04

## 2020-04-23 RX ADMIN — OXYCODONE HYDROCHLORIDE AND ACETAMINOPHEN 1 TABLET: 5; 325 TABLET ORAL at 04:04

## 2020-04-23 RX ADMIN — PRENATAL VIT W/ FE FUMARATE-FA TAB 27-0.8 MG 1 TABLET: 27-0.8 TAB at 09:04

## 2020-04-23 RX ADMIN — ACETAMINOPHEN 650 MG: 325 TABLET ORAL at 01:04

## 2020-04-23 RX ADMIN — MUPIROCIN: 20 OINTMENT TOPICAL at 09:04

## 2020-04-23 RX ADMIN — CEFTRIAXONE 1 G: 1 INJECTION, SOLUTION INTRAVENOUS at 09:04

## 2020-04-23 RX ADMIN — DOCUSATE SODIUM 200 MG: 100 CAPSULE, LIQUID FILLED ORAL at 09:04

## 2020-04-23 RX ADMIN — AZITHROMYCIN MONOHYDRATE 250 MG: 250 TABLET ORAL at 09:04

## 2020-04-23 NOTE — SUBJECTIVE & OBJECTIVE
Past Medical History:   Diagnosis Date    Anemia     Depression     Endometriosis of uterus 2020    diagnosed during        Past Surgical History:   Procedure Laterality Date     SECTION       SECTION N/A 2019    Procedure:  SECTION;  Surgeon: Ascencion Fajardo MD;  Location: [facility] L&D;  Service: OB/GYN;  Laterality: N/A;     SECTION N/A 2020    Procedure:  SECTION;  Surgeon: Jamie Tijerina Mai, MD;  Location: [facility] L&D OR;  Service: OB/GYN;  Laterality: N/A;       Review of patient's allergies indicates:  No Known Allergies    No current facility-administered medications on file prior to encounter.      Current Outpatient Medications on File Prior to Encounter   Medication Sig    acetaminophen (TYLENOL) 650 MG TbSR Take 1 tablet (650 mg total) by mouth 3 (three) times daily as needed (pain).    ondansetron (ZOFRAN-ODT) 4 MG TbDL DISSOLVE 1 T UNDER THE TONGUE Q 6 H PRN    ondansetron (ZOFRAN-ODT) 4 MG TbDL Take 1 tablet (4 mg total) by mouth every 6 (six) hours as needed (nausea).    PRENATAL VITAMIN 27 mg iron- 0.8 mg Tab TK 1 T PO D     Family History     Problem Relation (Age of Onset)    Diabetes Mother        Tobacco Use    Smoking status: Never Smoker    Smokeless tobacco: Never Used   Substance and Sexual Activity    Alcohol use: No     Frequency: Never    Drug use: No    Sexual activity: Yes     Partners: Male     Review of Systems   All other systems reviewed and are negative.    Objective:     Vital Signs (Most Recent):  Temp: 97.9 °F (36.6 °C) (20 0705)  Pulse: 82 (20 0705)  Resp: 18 (20 0705)  BP: 123/75 (20 0705)  SpO2: 96 % (20 0705) Vital Signs (24h Range):  Temp:  [97.8 °F (36.6 °C)-98.7 °F (37.1 °C)] 97.9 °F (36.6 °C)  Pulse:  [] 82  Resp:  [18-20] 18  SpO2:  [88 %-97 %] 96 %  BP: (112-128)/(72-94) 123/75     Weight: 81.2 kg (179 lb)  Body mass index is 33.82 kg/m².    Physical Exam    Constitutional: She is oriented to person, place, and time. She appears well-developed and well-nourished. No distress.   HENT:   Head: Normocephalic.   Mouth/Throat: No oropharyngeal exudate.   Eyes: Pupils are equal, round, and reactive to light. Conjunctivae and EOM are normal.   Neck: Normal range of motion. Neck supple. No JVD present.   Cardiovascular: Normal rate, regular rhythm and normal heart sounds. Exam reveals no gallop and no friction rub.   No murmur heard.  Pulmonary/Chest: Effort normal and breath sounds normal. No respiratory distress. She has no wheezes. She has no rales.   Abdominal: Soft. Bowel sounds are normal. She exhibits no distension. There is no tenderness. There is no guarding.   Musculoskeletal: Normal range of motion. She exhibits no edema, tenderness or deformity.   Neurological: She is alert and oriented to person, place, and time. She displays normal reflexes. No cranial nerve deficit or sensory deficit. She exhibits normal muscle tone. Coordination normal.   Skin: Skin is warm. Capillary refill takes less than 2 seconds. No rash noted. She is not diaphoretic. No erythema.   Psychiatric: She has a normal mood and affect. Her behavior is normal. Judgment and thought content normal.       Significant Labs: All pertinent labs within the past 24 hours have been reviewed.    Significant Imaging: I have reviewed and interpreted all pertinent imaging results/findings within the past 24 hours.

## 2020-04-23 NOTE — PROGRESS NOTES
[facility]  Obstetrics  Postpartum Progress Note    Patient Name: Radha Akbar  MRN: [id number]  Admission Date: 4/21/2020  Hospital Length of Stay: 2 days  Attending Physician: Harpreet Mlegoza MD  Primary Care Provider: Primary Doctor No    Subjective:     Principal Problem:COVID-19 virus infection    Hospital course: Pt is jose d every 2 minute, cervix is 1/90/-3  04/22/2020:Patient continues to have SOB and cough. She is currently on 2L of O2. Chest her with newly diagnosed pneumonia along with COVID-19+. Repeat labs pending. She was started on Rocephin and Azithromycin. Infant in NICU. Postop pain is controlled.   4/23/20:  Pt states she feels improved, still on O2 via NC; very minimal ambulation, voiding without problem; tolerating reg diet    Interval History: POD #2 s/p RLTCS, COVID +    She is doing well this morning. She is tolerating a regular diet without nausea or vomiting. She is voiding spontaneously. She is ambulating very minimally. She has passed flatus, and has not a BM. Vaginal bleeding is mild. She denies fever or chills. Abdominal pain is mild and controlled with oral medications. She is breastfeeding. She desires circumcision for her male baby: not applicable.    Objective:     Vital Signs (Most Recent):  Temp: 97.9 °F (36.6 °C) (04/23/20 0705)  Pulse: 82 (04/23/20 0705)  Resp: 18 (04/23/20 0705)  BP: 123/75 (04/23/20 0705)  SpO2: 96 % (04/23/20 0705) Vital Signs (24h Range):  Temp:  [97.8 °F (36.6 °C)-98.7 °F (37.1 °C)] 97.9 °F (36.6 °C)  Pulse:  [] 82  Resp:  [18-20] 18  SpO2:  [88 %-97 %] 96 %  BP: (112-128)/(72-94) 123/75     Weight: 81.2 kg (179 lb)  Body mass index is 33.82 kg/m².      Intake/Output Summary (Last 24 hours) at 4/23/2020 1018  Last data filed at 4/22/2020 1645  Gross per 24 hour   Intake --   Output 2350 ml   Net -2350 ml       Significant Labs:  Lab Results   Component Value Date    GROUPTRH O POS 04/21/2020    HEPBSAG Negative 10/03/2019     STREPBCULT No Group B Streptococcus isolated 2020     Recent Labs   Lab 20  0935   HGB 13.1   HCT 40.6       I have personallly reviewed all pertinent lab results from the last 24 hours.    Physical Exam:   Constitutional: She is oriented to person, place, and time. She appears well-developed and well-nourished. No distress.    HENT:   Head: Normocephalic and atraumatic.     Neck: Normal range of motion. No thyromegaly present.    Cardiovascular: Normal rate.     Pulmonary/Chest: Effort normal. No respiratory distress.        Abdominal: Soft. She exhibits no distension and no mass. There is no tenderness. There is no rebound and no guarding.   Wound CDI             Musculoskeletal: Normal range of motion and moves all extremeties. She exhibits no tenderness.       Neurological: She is alert and oriented to person, place, and time. No cranial nerve deficit. Coordination normal.    Skin: She is not diaphoretic.    Psychiatric: She has a normal mood and affect. Her behavior is normal. Judgment and thought content normal.       Assessment/Plan:     24 y.o. female  for:    * COVID-19 virus infection  Pt is symptomatic  O2 sat at low 90s -95      Elevated LFTs  Trending downward    Pneumonia due to COVID-19 virus  - Currently on Rocephin and Azithromycin  - Satting 90% on 2L of Oxygen  - Chest Xray reviewed by IM and Anesthesia  - Echo ordered  - WBC: 15  - Labs to be ordered this morning: CBC, CMP, PTT, PT, Fibrogen, D-Dimer, Ferritin, Lactic Acid    recs per medicine    S/P repeat low transverse   Postpartum care:  - Patient doing well. Continue routine management and advances.  - Continue IV pain to be transitioned to PO  Pain meds. Pain well controlled.  - Encourage ambulation.   - Heme: Pre Delivery h/h 15/48 --> Post Delivery h/h   - Contraception - declined BTL  - Lactation - Infant in the NICU  - Rh Status - OPOS    Routine care          Santi Bean MD  Obstetrics  [facility]

## 2020-04-23 NOTE — ASSESSMENT & PLAN NOTE
Postpartum care:  - Patient doing well. Continue routine management and advances.  - Continue IV pain to be transitioned to PO  Pain meds. Pain well controlled.  - Encourage ambulation.   - Heme: Pre Delivery h/h 15/48 --> Post Delivery h/h 13/37  - Contraception - declined BTL  - Lactation - Infant in the NICU  - Rh Status - OPOS

## 2020-04-23 NOTE — PROGRESS NOTES
[facility] Hospital Medicine  Progress Note    Patient Name: Radha Akbar  MRN: [id number]  Patient Class: IP- Inpatient   Admission Date: 2020  Length of Stay: 2 days  Attending Physician: Harpreet Melgoza MD  Primary Care Provider: Primary Doctor No        Subjective:     Principal Problem:COVID-19 virus infection        HPI:  24-year-old female status post  tonight 38 weeks gestational age found to be covert positive.  Patient has been sick for a week now.  She denies any shortness of breath nausea vomiting or diarrhea.  Patient being referred for consultation for her COVID positive state.       Overview/Hospital Course:  24-year-old female status post  tonight 38 weeks gestational age found to be covert positive.  Patient has been sick for a week now.  She denies any shortness of breath nausea vomiting or diarrhea.  Patient being referred for consultation for her COVID positive state.chest x ray consistent with viral  Pneumonia,started on Rocephin,zithromax,stable on NC O 2,prn IV lasix,  She is stable on 2 liter Nc O 2,leg US show no DVT.    Past Medical History:   Diagnosis Date    Anemia     Depression     Endometriosis of uterus 2020    diagnosed during        Past Surgical History:   Procedure Laterality Date     SECTION       SECTION N/A 2019    Procedure:  SECTION;  Surgeon: Ascencion Fajardo MD;  Location: [facility] L&D;  Service: OB/GYN;  Laterality: N/A;     SECTION N/A 2020    Procedure:  SECTION;  Surgeon: Jamie Tijerina Mai, MD;  Location: [facility] L&D OR;  Service: OB/GYN;  Laterality: N/A;       Review of patient's allergies indicates:  No Known Allergies    No current facility-administered medications on file prior to encounter.      Current Outpatient Medications on File Prior to Encounter   Medication Sig    acetaminophen (TYLENOL) 650 MG TbSR Take 1 tablet (650 mg total) by mouth 3 (three)  times daily as needed (pain).    ondansetron (ZOFRAN-ODT) 4 MG TbDL DISSOLVE 1 T UNDER THE TONGUE Q 6 H PRN    ondansetron (ZOFRAN-ODT) 4 MG TbDL Take 1 tablet (4 mg total) by mouth every 6 (six) hours as needed (nausea).    PRENATAL VITAMIN 27 mg iron- 0.8 mg Tab TK 1 T PO D     Family History     Problem Relation (Age of Onset)    Diabetes Mother        Tobacco Use    Smoking status: Never Smoker    Smokeless tobacco: Never Used   Substance and Sexual Activity    Alcohol use: No     Frequency: Never    Drug use: No    Sexual activity: Yes     Partners: Male     Review of Systems   All other systems reviewed and are negative.    Objective:     Vital Signs (Most Recent):  Temp: 97.9 °F (36.6 °C) (04/23/20 0705)  Pulse: 82 (04/23/20 0705)  Resp: 18 (04/23/20 0705)  BP: 123/75 (04/23/20 0705)  SpO2: 96 % (04/23/20 0705) Vital Signs (24h Range):  Temp:  [97.8 °F (36.6 °C)-98.7 °F (37.1 °C)] 97.9 °F (36.6 °C)  Pulse:  [] 82  Resp:  [18-20] 18  SpO2:  [88 %-97 %] 96 %  BP: (112-128)/(72-94) 123/75     Weight: 81.2 kg (179 lb)  Body mass index is 33.82 kg/m².    Physical Exam   Constitutional: She is oriented to person, place, and time. She appears well-developed and well-nourished. No distress.   HENT:   Head: Normocephalic.   Mouth/Throat: No oropharyngeal exudate.   Eyes: Pupils are equal, round, and reactive to light. Conjunctivae and EOM are normal.   Neck: Normal range of motion. Neck supple. No JVD present.   Cardiovascular: Normal rate, regular rhythm and normal heart sounds. Exam reveals no gallop and no friction rub.   No murmur heard.  Pulmonary/Chest: Effort normal and breath sounds normal. No respiratory distress. She has no wheezes. She has no rales.   Abdominal: Soft. Bowel sounds are normal. She exhibits no distension. There is no tenderness. There is no guarding.   Musculoskeletal: Normal range of motion. She exhibits no edema, tenderness or deformity.   Neurological: She is alert and  oriented to person, place, and time. She displays normal reflexes. No cranial nerve deficit or sensory deficit. She exhibits normal muscle tone. Coordination normal.   Skin: Skin is warm. Capillary refill takes less than 2 seconds. No rash noted. She is not diaphoretic. No erythema.   Psychiatric: She has a normal mood and affect. Her behavior is normal. Judgment and thought content normal.       Significant Labs: All pertinent labs within the past 24 hours have been reviewed.    Significant Imaging: I have reviewed and interpreted all pertinent imaging results/findings within the past 24 hours.      Assessment/Plan:      * COVID-19 virus infection    Patient currently with normal vital signs and O2 sats normal on room air.  No respiratory significant symptoms at this time continue supportive care.  Placed on contact and airborne precautions.  Check baseline COVID labs.  However does not appear to be significantly ill from the Coronavirus over a week out from symptoms.  chest x ray consistent with viral  Pneumonia,started on Rocephin,zithromax,stable on NC O 2,prn IV lasix,She is stable on 2 liter Nc O 2,leg US show no DVT.    Elevated LFTs  Duo to COVID 19,improving.      Pneumonia due to COVID-19 virus    chest x ray consistent with viral  Pneumonia,started on Rocephin,zithromax,stable on NC O 2,prn IV lasix,    Hx of  section  Per Ob      S/P repeat low transverse   S/P C- section,per OB,        VTE Risk Mitigation (From admission, onward)         Ordered     Place sequential compression device  Until discontinued      20     IP VTE LOW RISK PATIENT  Once      20                      Shan Kohli MD  Department of Hospital Medicine   [facility]

## 2020-04-23 NOTE — ASSESSMENT & PLAN NOTE
- Currently on Rocephin and Azithromycin  - Satting 90% on 2L of Oxygen  - Chest Xray reviewed by IM and Anesthesia  - Echo ordered  - WBC: 15  - Labs to be ordered this morning: CBC, CMP, PTT, PT, Fibrogen, D-Dimer, Ferritin, Lactic Acid

## 2020-04-23 NOTE — ASSESSMENT & PLAN NOTE
Patient currently with normal vital signs and O2 sats normal on room air.  No respiratory significant symptoms at this time continue supportive care.  Placed on contact and airborne precautions.  Check baseline COVID labs.  However does not appear to be significantly ill from the Coronavirus over a week out from symptoms.  chest x ray consistent with viral  Pneumonia,started on Rocephin,zithromax,stable on NC O 2,prn IV lasix,She is stable on 2 liter Nc O 2,leg US show no DVT.

## 2020-04-23 NOTE — PLAN OF CARE
Problem: Adult Inpatient Plan of Care  Goal: Plan of Care Review  Outcome: Ongoing, Progressing  Goal: Patient-Specific Goal (Individualization)  Outcome: Ongoing, Progressing  Goal: Absence of Hospital-Acquired Illness or Injury  Outcome: Ongoing, Progressing  Goal: Optimal Comfort and Wellbeing  Outcome: Ongoing, Progressing  Goal: Readiness for Transition of Care  Outcome: Ongoing, Progressing  Goal: Rounds/Family Conference  Outcome: Ongoing, Progressing     Problem: Wound  Goal: Optimal Wound Healing  Outcome: Ongoing, Progressing     Problem:  Fall Injury Risk  Goal: Absence of Fall, Infant Drop and Related Injury  Outcome: Ongoing, Progressing     Problem: Infection  Goal: Infection Symptom Resolution  Outcome: Ongoing, Progressing     Problem: Adjustment to Role Transition (Postpartum  Delivery)  Goal: Successful Maternal Role Transition  Outcome: Ongoing, Progressing     Problem: Bleeding (Postpartum  Delivery)  Goal: Hemostasis  Outcome: Ongoing, Progressing     Problem: Infection (Postpartum  Delivery)  Goal: Absence of Infection Signs/Symptoms  Outcome: Ongoing, Progressing     Problem: Pain (Postpartum  Delivery)  Goal: Acceptable Pain Control  Outcome: Ongoing, Progressing     Problem: Postoperative Nausea and Vomiting (Postpartum  Delivery)  Goal: Nausea and Vomiting Relief  Outcome: Ongoing, Progressing     Problem: Postoperative Urinary Retention (Postpartum  Delivery)  Goal: Effective Urinary Elimination  Outcome: Ongoing, Progressing

## 2020-04-23 NOTE — ASSESSMENT & PLAN NOTE
Postpartum care:  - Patient doing well. Continue routine management and advances.  - Continue IV pain to be transitioned to PO  Pain meds. Pain well controlled.  - Encourage ambulation.   - Heme: Pre Delivery h/h 15/48 --> Post Delivery h/h 13/37  - Contraception - declined BTL  - Lactation - Infant in the NICU  - Rh Status - OPOS    Routine care

## 2020-04-23 NOTE — PLAN OF CARE
04/23/20 1543   Discharge Assessment   Assessment Type Discharge Planning Assessment   TN attempted to reach patient and SO via phone without success.  TN to follow up at a later time.

## 2020-04-23 NOTE — PROGRESS NOTES
[facility]  Obstetrics  Postpartum Progress Note    Patient Name: Radha Akbar  MRN: [id number]  Admission Date: 4/21/2020  Hospital Length of Stay: 1 days  Attending Physician: Harpreet Melgoza MD  Primary Care Provider: Primary Doctor No    Subjective:     Principal Problem:COVID-19 virus infection    Hospital course: Pt is jose d every 2 minute, cervix is 1/90/-3  04/22/2020:Patient continues to have SOB and cough. She is currently on 2L of O2. Chest her with newly diagnosed pneumonia along with COVID-19+. Repeat labs pending. She was started on Rocephin and Azithromycin. Infant in NICU. Postop pain is controlled.     Interval History:   Patient continues to report coughing but denies fever and chills. She reports SOB that improved with oxygen.. She reports abdominal and incisional pain.  She is doing well this morning. She is tolerating a regular diet without nausea or vomiting. Patient has turcios catheter in place draining concentrated urine. Lochia is normal. Infant is NICU.    Objective:     Vital Signs (Most Recent):  Temp: 98.4 °F (36.9 °C) (04/22/20 1159)  Pulse: (!) 112 (04/22/20 1231)  Resp: 20 (04/22/20 1159)  BP: 112/72 (04/22/20 1231)  SpO2: (!) 94 % (04/22/20 1231) Vital Signs (24h Range):  Temp:  [97.7 °F (36.5 °C)-98.7 °F (37.1 °C)] 98.4 °F (36.9 °C)  Pulse:  [] 112  Resp:  [18-20] 20  SpO2:  [83 %-98 %] 94 %  BP: (104-138)/(56-85) 112/72     Weight: 81.2 kg (179 lb)  Body mass index is 33.82 kg/m².      Intake/Output Summary (Last 24 hours) at 4/22/2020 1344  Last data filed at 4/22/2020 1200  Gross per 24 hour   Intake 1600 ml   Output 3860 ml   Net -2260 ml       Significant Labs:  Lab Results   Component Value Date    GROUPTRH O POS 04/21/2020    HEPBSAG Negative 10/03/2019    STREPBCULT No Group B Streptococcus isolated 04/08/2020     Recent Labs   Lab 04/22/20  0935   HGB 13.1   HCT 40.6       CBC:   Recent Labs   Lab 04/22/20  0935   WBC 15.27*   RBC 4.49   HGB  13.1   HCT 40.6   *   MCV 90   MCH 29.2   MCHC 32.3     I have personallly reviewed all pertinent lab results from the last 24 hours.    Physical Exam:   Constitutional: She is oriented to person, place, and time. She appears well-developed and well-nourished.       Cardiovascular: Normal rate.     Pulmonary/Chest: Effort normal. No respiratory distress.        Abdominal: Soft. She exhibits abdominal incision. She exhibits no distension.   Appropriately tender     Genitourinary:   Genitourinary Comments: Uterus at umbilicus               Neurological: She is alert and oriented to person, place, and time.    Skin: Skin is warm and dry.    Psychiatric: She has a normal mood and affect.       Assessment/Plan:     24 y.o. female  for:    * COVID-19 virus infection  Pt is symptomatic  O2 sat at low 90s -95      Pneumonia due to COVID-19 virus  - Currently on Rocephin and Azithromycin  - Satting 90% on 2L of Oxygen  - Chest Xray reviewed by IM and Anesthesia  - Echo ordered  - WBC: 15  - Labs to be ordered this morning: CBC, CMP, PTT, PT, Fibrogen, D-Dimer, Ferritin, Lactic Acid    Hx of  section  Pt in labor  Plan for repeat CD now  Pt desires BTL and consent previous signed in clinic    S/P repeat low transverse   Postpartum care:  - Patient doing well. Continue routine management and advances.  - Continue IV pain to be transitioned to PO  Pain meds. Pain well controlled.  - Encourage ambulation.   - Heme: Pre Delivery h/h 15 --> Post Delivery h/h   - Contraception - declined BTL  - Lactation - Infant in the NICU  - Rh Status - OPOS        Disposition: Transfer to Med Surg.    Kay Campbell MD  Obstetrics  [facility]

## 2020-04-23 NOTE — SUBJECTIVE & OBJECTIVE
Hospital course: Pt is jose d every 2 minute, cervix is 1/90/-3  04/22/2020:Patient continues to have SOB and cough. She is currently on 2L of O2. Chest her with newly diagnosed pneumonia along with COVID-19+. Repeat labs pending. She was started on Rocephin and Azithromycin. Infant in NICU. Postop pain is controlled.   4/23/20:  Pt states she feels improved, still on O2 via NC; very minimal ambulation, voiding without problem; tolerating reg diet    Interval History: POD #2 s/p RLTCS, COVID +    She is doing well this morning. She is tolerating a regular diet without nausea or vomiting. She is voiding spontaneously. She is ambulating very minimally. She has passed flatus, and has not a BM. Vaginal bleeding is mild. She denies fever or chills. Abdominal pain is mild and controlled with oral medications. She is breastfeeding. She desires circumcision for her male baby: not applicable.    Objective:     Vital Signs (Most Recent):  Temp: 97.9 °F (36.6 °C) (04/23/20 0705)  Pulse: 82 (04/23/20 0705)  Resp: 18 (04/23/20 0705)  BP: 123/75 (04/23/20 0705)  SpO2: 96 % (04/23/20 0705) Vital Signs (24h Range):  Temp:  [97.8 °F (36.6 °C)-98.7 °F (37.1 °C)] 97.9 °F (36.6 °C)  Pulse:  [] 82  Resp:  [18-20] 18  SpO2:  [88 %-97 %] 96 %  BP: (112-128)/(72-94) 123/75     Weight: 81.2 kg (179 lb)  Body mass index is 33.82 kg/m².      Intake/Output Summary (Last 24 hours) at 4/23/2020 1018  Last data filed at 4/22/2020 1645  Gross per 24 hour   Intake --   Output 2350 ml   Net -2350 ml       Significant Labs:  Lab Results   Component Value Date    GROUPTRH O POS 04/21/2020    HEPBSAG Negative 10/03/2019    STREPBCULT No Group B Streptococcus isolated 04/08/2020     Recent Labs   Lab 04/22/20  0935   HGB 13.1   HCT 40.6       I have personallly reviewed all pertinent lab results from the last 24 hours.    Physical Exam:   Constitutional: She is oriented to person, place, and time. She appears well-developed and  well-nourished. No distress.    HENT:   Head: Normocephalic and atraumatic.     Neck: Normal range of motion. No thyromegaly present.    Cardiovascular: Normal rate.     Pulmonary/Chest: Effort normal. No respiratory distress.        Abdominal: Soft. She exhibits no distension and no mass. There is no tenderness. There is no rebound and no guarding.   Wound CDI             Musculoskeletal: Normal range of motion and moves all extremeties. She exhibits no tenderness.       Neurological: She is alert and oriented to person, place, and time. No cranial nerve deficit. Coordination normal.    Skin: She is not diaphoretic.    Psychiatric: She has a normal mood and affect. Her behavior is normal. Judgment and thought content normal.

## 2020-04-24 LAB
BASOPHILS # BLD AUTO: 0.05 K/UL (ref 0–0.2)
BASOPHILS NFR BLD: 0.4 % (ref 0–1.9)
CRP SERPL-MCNC: 47.9 MG/L (ref 0–8.2)
DIFFERENTIAL METHOD: ABNORMAL
EOSINOPHIL # BLD AUTO: 0.1 K/UL (ref 0–0.5)
EOSINOPHIL NFR BLD: 0.5 % (ref 0–8)
ERYTHROCYTE [DISTWIDTH] IN BLOOD BY AUTOMATED COUNT: 16.5 % (ref 11.5–14.5)
HCT VFR BLD AUTO: 38.1 % (ref 37–48.5)
HGB BLD-MCNC: 12.2 G/DL (ref 12–16)
IMM GRANULOCYTES # BLD AUTO: 0.57 K/UL (ref 0–0.04)
IMM GRANULOCYTES NFR BLD AUTO: 4.3 % (ref 0–0.5)
LYMPHOCYTES # BLD AUTO: 3.9 K/UL (ref 1–4.8)
LYMPHOCYTES NFR BLD: 29.7 % (ref 18–48)
MCH RBC QN AUTO: 29.2 PG (ref 27–31)
MCHC RBC AUTO-ENTMCNC: 32 G/DL (ref 32–36)
MCV RBC AUTO: 91 FL (ref 82–98)
MONOCYTES # BLD AUTO: 0.6 K/UL (ref 0.3–1)
MONOCYTES NFR BLD: 4.9 % (ref 4–15)
NEUTROPHILS # BLD AUTO: 7.9 K/UL (ref 1.8–7.7)
NEUTROPHILS NFR BLD: 60.2 % (ref 38–73)
NRBC BLD-RTO: 2 /100 WBC
PLATELET # BLD AUTO: 425 K/UL (ref 150–350)
PMV BLD AUTO: 9.8 FL (ref 9.2–12.9)
RBC # BLD AUTO: 4.18 M/UL (ref 4–5.4)
WBC # BLD AUTO: 13.17 K/UL (ref 3.9–12.7)

## 2020-04-24 PROCEDURE — 11000001 HC ACUTE MED/SURG PRIVATE ROOM

## 2020-04-24 PROCEDURE — 86140 C-REACTIVE PROTEIN: CPT

## 2020-04-24 PROCEDURE — 25000003 PHARM REV CODE 250: Performed by: OBSTETRICS & GYNECOLOGY

## 2020-04-24 PROCEDURE — 63700000 PHARM REV CODE 250 ALT 637 W/O HCPCS: Performed by: HOSPITALIST

## 2020-04-24 PROCEDURE — 99231 PR SUBSEQUENT HOSPITAL CARE,LEVL I: ICD-10-PCS | Mod: ,,, | Performed by: OBSTETRICS & GYNECOLOGY

## 2020-04-24 PROCEDURE — 85025 COMPLETE CBC W/AUTO DIFF WBC: CPT

## 2020-04-24 PROCEDURE — 99231 SBSQ HOSP IP/OBS SF/LOW 25: CPT | Mod: ,,, | Performed by: OBSTETRICS & GYNECOLOGY

## 2020-04-24 PROCEDURE — 63600175 PHARM REV CODE 636 W HCPCS: Performed by: HOSPITALIST

## 2020-04-24 PROCEDURE — 36415 COLL VENOUS BLD VENIPUNCTURE: CPT

## 2020-04-24 RX ADMIN — OXYCODONE HYDROCHLORIDE AND ACETAMINOPHEN 1 TABLET: 5; 325 TABLET ORAL at 08:04

## 2020-04-24 RX ADMIN — DOCUSATE SODIUM 200 MG: 100 CAPSULE, LIQUID FILLED ORAL at 09:04

## 2020-04-24 RX ADMIN — AZITHROMYCIN MONOHYDRATE 250 MG: 250 TABLET ORAL at 09:04

## 2020-04-24 RX ADMIN — CEFTRIAXONE 1 G: 1 INJECTION, SOLUTION INTRAVENOUS at 09:04

## 2020-04-24 RX ADMIN — OXYCODONE HYDROCHLORIDE AND ACETAMINOPHEN 1 TABLET: 5; 325 TABLET ORAL at 03:04

## 2020-04-24 RX ADMIN — MUPIROCIN: 20 OINTMENT TOPICAL at 09:04

## 2020-04-24 RX ADMIN — DOCUSATE SODIUM 200 MG: 100 CAPSULE, LIQUID FILLED ORAL at 08:04

## 2020-04-24 RX ADMIN — PRENATAL VIT W/ FE FUMARATE-FA TAB 27-0.8 MG 1 TABLET: 27-0.8 TAB at 09:04

## 2020-04-24 NOTE — NURSING
0845 GYN physician notified and ask to consult the hospitalist to admit patient under his care.

## 2020-04-24 NOTE — NURSING
NICU nurse Valeria ACUNA Notified and included in patient care to promote patient to optimal level of health. Spoke with patient with update about care of her baby and discharge planning for baby to be discharge to home with responsible  Relative. Spoke with patient concerning intervention and keeping as ordered oxygen in place. Spoke with patient about ambulating with assistance to promote health so that she could be discharge home to her baby. Patient uses body language and gesture and verbalizes yes.

## 2020-04-24 NOTE — NURSING
"Patient lying in semi fowlers position with oxygen not in place, lying at bedside. Assess SPO2 and patient at 86% on room air. Education implemented about oxygen and benefits, patients states "yes yes ' and return demonstration of putting oxygen tubing in place and adjusting oxygen tube for comfort. As ordered oxygen in place at 3L. Patient SPO2 at 93% on 3L oxygen.  "

## 2020-04-24 NOTE — PROGRESS NOTES
[facility] Hospital Medicine  Progress Note    Patient Name: Radha Akbar  MRN: [id number]  Patient Class: IP- Inpatient   Admission Date: 2020  Length of Stay: 3 days  Attending Physician: Harpreet Melgoza MD  Primary Care Provider: Primary Doctor No        Subjective:     Principal Problem:COVID-19 virus infection        HPI:  24-year-old female status post  tonight 38 weeks gestational age found to be covert positive.  Patient has been sick for a week now.  She denies any shortness of breath nausea vomiting or diarrhea.  Patient being referred for consultation for her COVID positive state.       Overview/Hospital Course:  24-year-old female status post  tonight 38 weeks gestational age found to be covert positive.  Patient has been sick for a week now.  She denies any shortness of breath nausea vomiting or diarrhea.  Patient being referred for consultation for her COVID positive state.chest x ray consistent with viral  Pneumonia,started on Rocephin,zithromax,stable on NC O 2,prn IV lasix,  She is stable on 2 liter Nc O 2,leg US show no DVT.  Labs are pending for today,continue monitor CRP.    Past Medical History:   Diagnosis Date    Anemia     Depression     Endometriosis of uterus 2020    diagnosed during        Past Surgical History:   Procedure Laterality Date     SECTION       SECTION N/A 2019    Procedure:  SECTION;  Surgeon: Ascencion Fajardo MD;  Location: [facility] L&D;  Service: OB/GYN;  Laterality: N/A;     SECTION N/A 2020    Procedure:  SECTION;  Surgeon: Jamie Tijerina Mai, MD;  Location: [facility] L&D OR;  Service: OB/GYN;  Laterality: N/A;       Review of patient's allergies indicates:  No Known Allergies    No current facility-administered medications on file prior to encounter.      Current Outpatient Medications on File Prior to Encounter   Medication Sig    acetaminophen (TYLENOL) 650 MG  TbSR Take 1 tablet (650 mg total) by mouth 3 (three) times daily as needed (pain).    ondansetron (ZOFRAN-ODT) 4 MG TbDL DISSOLVE 1 T UNDER THE TONGUE Q 6 H PRN    ondansetron (ZOFRAN-ODT) 4 MG TbDL Take 1 tablet (4 mg total) by mouth every 6 (six) hours as needed (nausea).    PRENATAL VITAMIN 27 mg iron- 0.8 mg Tab TK 1 T PO D     Family History     Problem Relation (Age of Onset)    Diabetes Mother        Tobacco Use    Smoking status: Never Smoker    Smokeless tobacco: Never Used   Substance and Sexual Activity    Alcohol use: No     Frequency: Never    Drug use: No    Sexual activity: Yes     Partners: Male     Review of Systems   All other systems reviewed and are negative.    Objective:     Vital Signs (Most Recent):  Temp: 98.1 °F (36.7 °C) (04/24/20 0400)  Pulse: 82 (04/24/20 0400)  Resp: 18 (04/24/20 0400)  BP: 126/82 (04/24/20 0400)  SpO2: (!) 91 % (04/24/20 0400) Vital Signs (24h Range):  Temp:  [98 °F (36.7 °C)-98.6 °F (37 °C)] 98.1 °F (36.7 °C)  Pulse:  [] 82  Resp:  [17-19] 18  SpO2:  [90 %-95 %] 91 %  BP: (113-129)/(80-88) 126/82     Weight: 81.2 kg (179 lb)  Body mass index is 33.82 kg/m².    Physical Exam   Constitutional: She is oriented to person, place, and time. She appears well-developed and well-nourished. No distress.   HENT:   Head: Normocephalic.   Mouth/Throat: No oropharyngeal exudate.   Eyes: Pupils are equal, round, and reactive to light. Conjunctivae and EOM are normal.   Neck: Normal range of motion. Neck supple. No JVD present.   Cardiovascular: Normal rate, regular rhythm and normal heart sounds. Exam reveals no gallop and no friction rub.   No murmur heard.  Pulmonary/Chest: Effort normal and breath sounds normal. No respiratory distress. She has no wheezes. She has no rales.   Abdominal: Soft. Bowel sounds are normal. She exhibits no distension. There is no tenderness. There is no guarding.   Musculoskeletal: Normal range of motion. She exhibits no edema, tenderness  or deformity.   Neurological: She is alert and oriented to person, place, and time. She displays normal reflexes. No cranial nerve deficit or sensory deficit. She exhibits normal muscle tone. Coordination normal.   Skin: Skin is warm. Capillary refill takes less than 2 seconds. No rash noted. She is not diaphoretic. No erythema.   Psychiatric: She has a normal mood and affect. Her behavior is normal. Judgment and thought content normal.       Significant Labs: All pertinent labs within the past 24 hours have been reviewed.    Significant Imaging: I have reviewed and interpreted all pertinent imaging results/findings within the past 24 hours.      Assessment/Plan:      * COVID-19 virus infection    Patient currently with normal vital signs and O2 sats normal on room air.  No respiratory significant symptoms at this time continue supportive care.  Placed on contact and airborne precautions.  Check baseline COVID labs.  However does not appear to be significantly ill from the Coronavirus over a week out from symptoms.  chest x ray consistent with viral  Pneumonia,started on Rocephin,zithromax,stable on NC O 2,prn IV lasix,She is stable on 2 liter Nc O 2,leg US show no DVT.  Labs are pending for today,continue monitor CRP.    Elevated LFTs  Duo to COVID 19,improving.      Pneumonia due to COVID-19 virus    chest x ray consistent with viral  Pneumonia,started on Rocephin,zithromax,stable on NC O 2,prn IV lasix,    S/P repeat low transverse   S/P C- section,per OB,        VTE Risk Mitigation (From admission, onward)         Ordered     Place sequential compression device  Until discontinued      20     IP VTE LOW RISK PATIENT  Once      20                      Shan Kohli MD  Department of Hospital Medicine   [facility]

## 2020-04-24 NOTE — NURSING
Spo2 at 95% on 3l of oxygen, assist patient with ambulating from bed to door with assistance. Patient insist on walking from bed to door x3 forward and than back to bed with assistance. SPO2 assessed and  spo2 at 93% on room air. Patient sitting in upright position on side of bed. Oxygen put in place and titered to 2 Liters. Oxygen reassess at 95%on 2Liters.

## 2020-04-24 NOTE — NURSING
Notified Hospitalist and informed by hospitalist to notified GYN that patient must remain under his care until cleared from hospital.

## 2020-04-24 NOTE — ASSESSMENT & PLAN NOTE
Patient currently with normal vital signs and O2 sats normal on room air.  No respiratory significant symptoms at this time continue supportive care.  Placed on contact and airborne precautions.  Check baseline COVID labs.  However does not appear to be significantly ill from the Coronavirus over a week out from symptoms.  chest x ray consistent with viral  Pneumonia,started on Rocephin,zithromax,stable on NC O 2,prn IV lasix,She is stable on 2 liter Nc O 2,leg US show no DVT.  Labs are pending for today,continue monitor CRP.

## 2020-04-24 NOTE — NURSING
Notified GYN and informed of hospitalist recommendation for patient to remain in his care until cleared by hospital. GYN voice concern about continuing and ask to speak to Charge nurse. Charge nurse informed and spoke with the GYN concerning policies and procedures.

## 2020-04-24 NOTE — SUBJECTIVE & OBJECTIVE
Past Medical History:   Diagnosis Date    Anemia     Depression     Endometriosis of uterus 2020    diagnosed during        Past Surgical History:   Procedure Laterality Date     SECTION       SECTION N/A 2019    Procedure:  SECTION;  Surgeon: Ascencion Fajardo MD;  Location: [facility] L&D;  Service: OB/GYN;  Laterality: N/A;     SECTION N/A 2020    Procedure:  SECTION;  Surgeon: Jamie Tijerina Mai, MD;  Location: [facility] L&D OR;  Service: OB/GYN;  Laterality: N/A;       Review of patient's allergies indicates:  No Known Allergies    No current facility-administered medications on file prior to encounter.      Current Outpatient Medications on File Prior to Encounter   Medication Sig    acetaminophen (TYLENOL) 650 MG TbSR Take 1 tablet (650 mg total) by mouth 3 (three) times daily as needed (pain).    ondansetron (ZOFRAN-ODT) 4 MG TbDL DISSOLVE 1 T UNDER THE TONGUE Q 6 H PRN    ondansetron (ZOFRAN-ODT) 4 MG TbDL Take 1 tablet (4 mg total) by mouth every 6 (six) hours as needed (nausea).    PRENATAL VITAMIN 27 mg iron- 0.8 mg Tab TK 1 T PO D     Family History     Problem Relation (Age of Onset)    Diabetes Mother        Tobacco Use    Smoking status: Never Smoker    Smokeless tobacco: Never Used   Substance and Sexual Activity    Alcohol use: No     Frequency: Never    Drug use: No    Sexual activity: Yes     Partners: Male     Review of Systems   All other systems reviewed and are negative.    Objective:     Vital Signs (Most Recent):  Temp: 98.1 °F (36.7 °C) (20 0400)  Pulse: 82 (20 0400)  Resp: 18 (20 0400)  BP: 126/82 (20 0400)  SpO2: (!) 91 % (20 0400) Vital Signs (24h Range):  Temp:  [98 °F (36.7 °C)-98.6 °F (37 °C)] 98.1 °F (36.7 °C)  Pulse:  [] 82  Resp:  [17-19] 18  SpO2:  [90 %-95 %] 91 %  BP: (113-129)/(80-88) 126/82     Weight: 81.2 kg (179 lb)  Body mass index is 33.82 kg/m².    Physical Exam    Constitutional: She is oriented to person, place, and time. She appears well-developed and well-nourished. No distress.   HENT:   Head: Normocephalic.   Mouth/Throat: No oropharyngeal exudate.   Eyes: Pupils are equal, round, and reactive to light. Conjunctivae and EOM are normal.   Neck: Normal range of motion. Neck supple. No JVD present.   Cardiovascular: Normal rate, regular rhythm and normal heart sounds. Exam reveals no gallop and no friction rub.   No murmur heard.  Pulmonary/Chest: Effort normal and breath sounds normal. No respiratory distress. She has no wheezes. She has no rales.   Abdominal: Soft. Bowel sounds are normal. She exhibits no distension. There is no tenderness. There is no guarding.   Musculoskeletal: Normal range of motion. She exhibits no edema, tenderness or deformity.   Neurological: She is alert and oriented to person, place, and time. She displays normal reflexes. No cranial nerve deficit or sensory deficit. She exhibits normal muscle tone. Coordination normal.   Skin: Skin is warm. Capillary refill takes less than 2 seconds. No rash noted. She is not diaphoretic. No erythema.   Psychiatric: She has a normal mood and affect. Her behavior is normal. Judgment and thought content normal.       Significant Labs: All pertinent labs within the past 24 hours have been reviewed.    Significant Imaging: I have reviewed and interpreted all pertinent imaging results/findings within the past 24 hours.

## 2020-04-25 VITALS
HEIGHT: 61 IN | TEMPERATURE: 98 F | DIASTOLIC BLOOD PRESSURE: 83 MMHG | SYSTOLIC BLOOD PRESSURE: 126 MMHG | WEIGHT: 179 LBS | HEART RATE: 59 BPM | OXYGEN SATURATION: 95 % | RESPIRATION RATE: 17 BRPM | BODY MASS INDEX: 33.79 KG/M2

## 2020-04-25 PROCEDURE — 94761 N-INVAS EAR/PLS OXIMETRY MLT: CPT

## 2020-04-25 PROCEDURE — 99238 HOSP IP/OBS DSCHRG MGMT 30/<: CPT | Mod: ,,, | Performed by: OBSTETRICS & GYNECOLOGY

## 2020-04-25 PROCEDURE — 27000221 HC OXYGEN, UP TO 24 HOURS

## 2020-04-25 PROCEDURE — 99238 PR HOSPITAL DISCHARGE DAY,<30 MIN: ICD-10-PCS | Mod: ,,, | Performed by: OBSTETRICS & GYNECOLOGY

## 2020-04-25 PROCEDURE — 25000003 PHARM REV CODE 250: Performed by: OBSTETRICS & GYNECOLOGY

## 2020-04-25 PROCEDURE — 63600175 PHARM REV CODE 636 W HCPCS: Performed by: HOSPITALIST

## 2020-04-25 PROCEDURE — 63700000 PHARM REV CODE 250 ALT 637 W/O HCPCS: Performed by: HOSPITALIST

## 2020-04-25 RX ORDER — OXYCODONE AND ACETAMINOPHEN 5; 325 MG/1; MG/1
1 TABLET ORAL EVERY 4 HOURS PRN
Qty: 30 TABLET | Refills: 0 | Status: SHIPPED | OUTPATIENT
Start: 2020-04-25

## 2020-04-25 RX ADMIN — AZITHROMYCIN MONOHYDRATE 250 MG: 250 TABLET ORAL at 09:04

## 2020-04-25 RX ADMIN — CEFTRIAXONE 1 G: 1 INJECTION, SOLUTION INTRAVENOUS at 09:04

## 2020-04-25 RX ADMIN — MUPIROCIN: 20 OINTMENT TOPICAL at 09:04

## 2020-04-25 RX ADMIN — DOCUSATE SODIUM 200 MG: 100 CAPSULE, LIQUID FILLED ORAL at 09:04

## 2020-04-25 RX ADMIN — PRENATAL VIT W/ FE FUMARATE-FA TAB 27-0.8 MG 1 TABLET: 27-0.8 TAB at 09:04

## 2020-04-25 RX ADMIN — OXYCODONE HYDROCHLORIDE AND ACETAMINOPHEN 1 TABLET: 5; 325 TABLET ORAL at 05:04

## 2020-04-25 NOTE — PROGRESS NOTES
"[facility]    Obstetrics & Gynecology  Progress Note      Patient Name:  Radha Akbar  MRN:  [id number]  Admission Date:  2020  Hospital Length of Stay:  3  Attending Physician:  Harpreet Melgoza MD    Subjective:     Date:  2020    Principal Problem:  COVID-19 virus infection    Post-op Day # 3 - s/p repeat  at 38w1d due to previous  in active labor   Surgery was uncomplicated  Medicine consulted for COVID care    Pt c/o mild crampy incisional pain  No acute events overnight  Denies active cough, fever, SOB, dizziness, NOEL, or CP  Pt currently 2L NC O2 due to intermittent decrease in O2 sats  Pt appears comfortable, dose not appear symptomatic from COVID  Pain well controlled  Lochia less than menses  Plan to bottle/breast feed  Breast non-tender, non-engorged  Ambulating, tolerating diet, and voiding without diffculty   Bonding in NICU    Objective:     Temp:  [98 °F (36.7 °C)-98.6 °F (37 °C)] 98.2 °F (36.8 °C)  Pulse:  [63-92] 79  Resp:  [16-18] 18  SpO2:  [89 %-95 %] 94 %  BP: (113-126)/(73-85) 121/73    /73   Pulse 79   Temp 98.2 °F (36.8 °C) (Oral)   Resp 18   Ht 5' 1" (1.549 m)   Wt 81.2 kg (179 lb)   LMP 2019 (Exact Date)   SpO2 (!) 94%   Breastfeeding? No   BMI 33.82 kg/m²   Clear, peter urine    Physical Exam:   Constitutional: She appears well-developed. No distress.                             Alert and oriented x 3.   HEENT:  No scleral icterus. Neck supple. Moist mucus membranes.   LUNGS:  Normal respiratory effort, overall clear  HEART:  Regular rate and rhythm with physiologic heart sounds.   ABDOMEN:  Soft, appropriately tender, non-distended, no guarding, rigidity, or rebound with normoactive bowel sounds.  FUNDUS:  Firm, nontender below the umbilicus.   INCISION: Clean, dry, & intact.  EXTREMITIES:  No cramping, claudication. Trace edema LE. +2 distal pulses. Symmetric, full range of motion, negative Aviles.  NEUROLOGIC:  " Grossly intact bilaterally.  +2 DTR's.   PSYCH:  Mood and affect appropriate.   PERINEUM:  Intact with light lochia.     Labs:      Recent Results (from the past 336 hour(s))   CBC auto differential    Collection Time: 20  6:03 PM   Result Value Ref Range    WBC 13.17 (H) 3.90 - 12.70 K/uL    Hemoglobin 12.2 12.0 - 16.0 g/dL    Hematocrit 38.1 37.0 - 48.5 %    Platelets 425 (H) 150 - 350 K/uL   CBC auto differential    Collection Time: 20  9:35 AM   Result Value Ref Range    WBC 15.27 (H) 3.90 - 12.70 K/uL    Hemoglobin 13.1 12.0 - 16.0 g/dL    Hematocrit 40.6 37.0 - 48.5 %    Platelets 393 (H) 150 - 350 K/uL   CBC with Automated Differential    Collection Time: 20 11:45 PM   Result Value Ref Range    WBC 14.22 (H) 3.90 - 12.70 K/uL    Hemoglobin 14.9 12.0 - 16.0 g/dL    Hematocrit 48.5 37.0 - 48.5 %    Platelets 447 (H) 150 - 350 K/uL     ABO:  O POS    Assessment:     1. Post-op day # 3 - IUP at 38w1d s/p repeat low transverse  - clinically stable  2. COVID 19 positive    Plan:     Continue post-op care    Continue advances    Encourage ambulation    NICU keeping mother informed of baby status    Medicine consulted for COVID, appreciate recommendations, continue supportive care, currently on Z-pack and IV ceftriaxone    Plan of care d/w pt, questions answered and pt voiced understanding.     Disposition:  Pt ready for discharge from a surgical perspective, awaiting discharge planning for COVID per medicine       Harpreet Melgoza MD

## 2020-04-25 NOTE — NURSING
Discharge instructions given to patient at bedside. Patient verbalized understanding and states willingness to comply. Saline lock removed.

## 2020-04-25 NOTE — PROGRESS NOTES
"[facility]    Obstetrics & Gynecology  Progress Note      Patient Name:  Radha Akbar  MRN:  [id number]  Admission Date:  2020  Hospital Length of Stay:  4  Attending Physician:  Harpreet Melgoza MD    Subjective:     Date:  2020    Principal Problem:  COVID-19 virus infection    Post-op Day # 4 - s/p repeat  at 38w1d due to previous  in active labor   Surgery was uncomplicated  Post-op recovery uncomplicated  Medicine consulted for COVID care  Pt currently on RA with no respiratory difficulty  O2 sat acceptable range    Pt has no major complaints today.  No acute events overnight  Pt requesting to go home  Denies active cough, fever, SOB, dizziness, NOEL, or CP  Pt appears comfortable, dose not appear symptomatic from COVID  Pain well controlled  Lochia less than menses  Plan to bottle/breast feed  Breast non-tender, non-engorged  Ambulating, tolerating diet, and voiding without diffculty   Baby in NICU currently on isolation    Objective:     Temp:  [97.6 °F (36.4 °C)-98.2 °F (36.8 °C)] 98.2 °F (36.8 °C)  Pulse:  [59-80] 59  Resp:  [17-20] 17  SpO2:  [90 %-95 %] 95 %  BP: (110-130)/(68-83) 126/83    /83 (BP Location: Left arm, Patient Position: Lying)   Pulse (!) 59   Temp 98.2 °F (36.8 °C) (Oral)   Resp 17   Ht 5' 1" (1.549 m)   Wt 81.2 kg (179 lb)   LMP 2019 (Exact Date)   SpO2 95%   Breastfeeding? No   BMI 33.82 kg/m²   Clear, peter urine    Physical Exam:   Constitutional: She appears well-developed. No distress.                             Alert and oriented x 3.   HEENT:  No scleral icterus. Neck supple. Moist mucus membranes.   LUNGS:  Normal respiratory effort, overall clear  HEART:  Regular rate and rhythm with physiologic heart sounds.   ABDOMEN:  Soft, appropriately tender, non-distended, no guarding, rigidity, or rebound with normoactive bowel sounds.  FUNDUS:  Firm, nontender below the umbilicus.   INCISION: Clean, dry, & " intact.  EXTREMITIES:  No cramping, claudication. Trace edema LE. +2 distal pulses. Symmetric, full range of motion, negative Aviles.  NEUROLOGIC:  Grossly intact bilaterally.  +2 DTR's.   PSYCH:  Mood and affect appropriate.   PERINEUM:  Intact with light lochia.     Labs:      Recent Results (from the past 336 hour(s))   CBC auto differential    Collection Time: 20  6:03 PM   Result Value Ref Range    WBC 13.17 (H) 3.90 - 12.70 K/uL    Hemoglobin 12.2 12.0 - 16.0 g/dL    Hematocrit 38.1 37.0 - 48.5 %    Platelets 425 (H) 150 - 350 K/uL   CBC auto differential    Collection Time: 20  9:35 AM   Result Value Ref Range    WBC 15.27 (H) 3.90 - 12.70 K/uL    Hemoglobin 13.1 12.0 - 16.0 g/dL    Hematocrit 40.6 37.0 - 48.5 %    Platelets 393 (H) 150 - 350 K/uL   CBC with Automated Differential    Collection Time: 20 11:45 PM   Result Value Ref Range    WBC 14.22 (H) 3.90 - 12.70 K/uL    Hemoglobin 14.9 12.0 - 16.0 g/dL    Hematocrit 48.5 37.0 - 48.5 %    Platelets 447 (H) 150 - 350 K/uL     BMP  Lab Results   Component Value Date     2020    K 4.2 2020     (H) 2020    CO2 21 (L) 2020    BUN 6 2020    CREATININE 0.9 2020    CALCIUM 8.0 (L) 2020    ANIONGAP 10 2020    ESTGFRAFRICA >60 2020    EGFRNONAA >60 2020     Lab Results   Component Value Date     (H) 2020     (H) 2020    ALKPHOS 265 (H) 2020    BILITOT 4.7 (H) 2020     Assessment:     1. Post-op day # 4 - IUP at 38w1d s/p repeat low transverse  - clinically stable  2. COVID 19 positive with no severe symptoms, asymptomatic on room air    Plan:     Plan for discharge today.     NICU keeping mother informed of baby status    Medicine consulted for COVID, appreciate recommendations, clear for discharge, continue supportive home COVID care.  Discussed with pt our local health care system response to Covid-19.  Discussed preventive  measures, social distancing, and parameters for testing.  Clinic and hospital mitigation policies and preventative strategies discussed.  Return to hospital if pt develops fevers, difficulty breathing, SOB, CP, pulse > 100 at rest, weakness, or any urgent concerns.    Reviewed discharge medications.  Pt was instructed to avoid driving or operate heavy machinery while taking narcotics or other medications with sedative properties.    Post-partum care instructions and precautions, and hospital course reviewed with pt prior to discharge.  All of her questions were answered to her satisfaction.  Pt voiced understanding and agreeable with discharge.    Return to clinic in 4 weeks for post-partum visit or sooner if any concerns.      Pt advised to follow up with [facility] in 1 week for COVID follow up.    Go to ER for any emergencies.      Harpreet Melgoza MD

## 2020-04-25 NOTE — PROGRESS NOTES
[facility] Hospital Medicine  Progress Note    Patient Name: Radha Akbar  MRN: [id number]  Patient Class: IP- Inpatient   Admission Date: 2020  Length of Stay: 4 days  Attending Physician: Harpreet Melgoza MD  Primary Care Provider: Primary Doctor No        Subjective:     Principal Problem:COVID-19 virus infection        HPI:  24-year-old female status post  tonight 38 weeks gestational age found to be covert positive.  Patient has been sick for a week now.  She denies any shortness of breath nausea vomiting or diarrhea.  Patient being referred for consultation for her COVID positive state.       Overview/Hospital Course:  24-year-old female status post  tonight 38 weeks gestational age found to be covert positive.  Patient has been sick for a week now.  She denies any shortness of breath nausea vomiting or diarrhea.  Patient being referred for consultation for her COVID positive state.chest x ray consistent with viral  Pneumonia,started on Rocephin,zithromax,stable on NC O 2,prn IV lasix,  She was stable on 2 liter Nc O 2,leg US show no DVT.  Patient is table on RA,afebrile,stable for discharge from  stand point regarding COVID,no need for Abx,    Past Medical History:   Diagnosis Date    Anemia     Depression     Endometriosis of uterus 2020    diagnosed during        Past Surgical History:   Procedure Laterality Date     SECTION       SECTION N/A 2019    Procedure:  SECTION;  Surgeon: Ascencion Fajardo MD;  Location: [facility] L&D;  Service: OB/GYN;  Laterality: N/A;     SECTION N/A 2020    Procedure:  SECTION;  Surgeon: Jamie Tijerina Mai, MD;  Location: [facility] L&D OR;  Service: OB/GYN;  Laterality: N/A;       Review of patient's allergies indicates:  No Known Allergies    No current facility-administered medications on file prior to encounter.      Current Outpatient Medications on File Prior to  Encounter   Medication Sig    acetaminophen (TYLENOL) 650 MG TbSR Take 1 tablet (650 mg total) by mouth 3 (three) times daily as needed (pain).    ondansetron (ZOFRAN-ODT) 4 MG TbDL DISSOLVE 1 T UNDER THE TONGUE Q 6 H PRN    ondansetron (ZOFRAN-ODT) 4 MG TbDL Take 1 tablet (4 mg total) by mouth every 6 (six) hours as needed (nausea).    PRENATAL VITAMIN 27 mg iron- 0.8 mg Tab TK 1 T PO D     Family History     Problem Relation (Age of Onset)    Diabetes Mother        Tobacco Use    Smoking status: Never Smoker    Smokeless tobacco: Never Used   Substance and Sexual Activity    Alcohol use: No     Frequency: Never    Drug use: No    Sexual activity: Yes     Partners: Male     Review of Systems   All other systems reviewed and are negative.    Objective:     Vital Signs (Most Recent):  Temp: 98.2 °F (36.8 °C) (04/25/20 0710)  Pulse: 80 (04/25/20 0710)  Resp: 17 (04/25/20 0710)  BP: 110/73 (04/25/20 0710)  SpO2: (!) 94 % (04/25/20 0710) Vital Signs (24h Range):  Temp:  [97.6 °F (36.4 °C)-98.2 °F (36.8 °C)] 98.2 °F (36.8 °C)  Pulse:  [63-83] 80  Resp:  [16-20] 17  SpO2:  [89 %-95 %] 94 %  BP: (110-130)/(68-85) 110/73     Weight: 81.2 kg (179 lb)  Body mass index is 33.82 kg/m².    Physical Exam   Constitutional: She is oriented to person, place, and time. She appears well-developed and well-nourished. No distress.   HENT:   Head: Normocephalic.   Mouth/Throat: No oropharyngeal exudate.   Eyes: Pupils are equal, round, and reactive to light. Conjunctivae and EOM are normal.   Neck: Normal range of motion. Neck supple. No JVD present.   Cardiovascular: Normal rate, regular rhythm and normal heart sounds. Exam reveals no gallop and no friction rub.   No murmur heard.  Pulmonary/Chest: Effort normal and breath sounds normal. No respiratory distress. She has no wheezes. She has no rales.   Abdominal: Soft. Bowel sounds are normal. She exhibits no distension. There is no tenderness. There is no guarding.    Musculoskeletal: Normal range of motion. She exhibits no edema, tenderness or deformity.   Neurological: She is alert and oriented to person, place, and time. She displays normal reflexes. No cranial nerve deficit or sensory deficit. She exhibits normal muscle tone. Coordination normal.   Skin: Skin is warm. Capillary refill takes less than 2 seconds. No rash noted. She is not diaphoretic. No erythema.   Psychiatric: She has a normal mood and affect. Her behavior is normal. Judgment and thought content normal.       Significant Labs: All pertinent labs within the past 24 hours have been reviewed.    Significant Imaging: I have reviewed and interpreted all pertinent imaging results/findings within the past 24 hours.      Assessment/Plan:      * COVID-19 virus infection    Patient currently with normal vital signs and O2 sats normal on room air.  No respiratory significant symptoms at this time continue supportive care.  Placed on contact and airborne precautions.  Check baseline COVID labs.  However does not appear to be significantly ill from the Coronavirus over a week out from symptoms.  chest x ray consistent with viral  Pneumonia,started on Rocephin,zithromax,stable on NC O 2,prn IV lasix,She is stable on 2 liter Nc O 2,leg US show no DVT.  Patient is table on RA,afebrile,stable for discharge from  stand point regarding COVID,no need for Abx,    Elevated LFTs  Duo to COVID 19,improving.      Pneumonia due to COVID-19 virus    chest x ray consistent with viral  Pneumonia,started on Rocephin,zithromax,was stable on NC O 2,prn IV lasix,  Patient is table on RA,afebrile,stable for discharge from  stand point regarding COVID,no need for Abx,    S/P repeat low transverse   S/P C- section,per OB,        VTE Risk Mitigation (From admission, onward)         Ordered     Place sequential compression device  Until discontinued      20     IP VTE LOW RISK PATIENT  Once      20                       Shan Kohli MD  Department of Hospital Medicine   [facility]

## 2020-04-25 NOTE — PLAN OF CARE
Patient reportedly to be transported home by significant other, Kwadwo Alva [phone]. Pt will be required to schedule follow-up appointment with [facility].        04/25/20 1300   Final Note   Assessment Type Final Discharge Note   Anticipated Discharge Disposition Home   What phone number can be called within the next 1-3 days to see how you are doing after discharge?   (see chart)   Hospital Follow Up  Appt(s) scheduled? No  (pt instructed to schedule follow-up appointment at [facility])   Discharge plans and expectations educations in teach back method with documentation complete? Yes   Right Care Referral Info   Post Acute Recommendation No Care   Post-Acute Status   Post-Acute Authorization Other   Other Status No Post-Acute Service Needs   Discharge Delays None known at this time

## 2020-04-25 NOTE — ASSESSMENT & PLAN NOTE
chest x ray consistent with viral  Pneumonia,started on Rocephin,zithromax,was stable on NC O 2,prn IV lasix,  Patient is table on RA,afebrile,stable for discharge from  stand point regarding COVID,no need for Abx,

## 2020-04-25 NOTE — SUBJECTIVE & OBJECTIVE
Past Medical History:   Diagnosis Date    Anemia     Depression     Endometriosis of uterus 2020    diagnosed during        Past Surgical History:   Procedure Laterality Date     SECTION       SECTION N/A 2019    Procedure:  SECTION;  Surgeon: Ascencion Fajardo MD;  Location: [facility] L&D;  Service: OB/GYN;  Laterality: N/A;     SECTION N/A 2020    Procedure:  SECTION;  Surgeon: Jamie Tijerina Mai, MD;  Location: [facility] L&D OR;  Service: OB/GYN;  Laterality: N/A;       Review of patient's allergies indicates:  No Known Allergies    No current facility-administered medications on file prior to encounter.      Current Outpatient Medications on File Prior to Encounter   Medication Sig    acetaminophen (TYLENOL) 650 MG TbSR Take 1 tablet (650 mg total) by mouth 3 (three) times daily as needed (pain).    ondansetron (ZOFRAN-ODT) 4 MG TbDL DISSOLVE 1 T UNDER THE TONGUE Q 6 H PRN    ondansetron (ZOFRAN-ODT) 4 MG TbDL Take 1 tablet (4 mg total) by mouth every 6 (six) hours as needed (nausea).    PRENATAL VITAMIN 27 mg iron- 0.8 mg Tab TK 1 T PO D     Family History     Problem Relation (Age of Onset)    Diabetes Mother        Tobacco Use    Smoking status: Never Smoker    Smokeless tobacco: Never Used   Substance and Sexual Activity    Alcohol use: No     Frequency: Never    Drug use: No    Sexual activity: Yes     Partners: Male     Review of Systems   All other systems reviewed and are negative.    Objective:     Vital Signs (Most Recent):  Temp: 98.2 °F (36.8 °C) (20 0710)  Pulse: 80 (20 0710)  Resp: 17 (20 0710)  BP: 110/73 (20 0710)  SpO2: (!) 94 % (20 0710) Vital Signs (24h Range):  Temp:  [97.6 °F (36.4 °C)-98.2 °F (36.8 °C)] 98.2 °F (36.8 °C)  Pulse:  [63-83] 80  Resp:  [16-20] 17  SpO2:  [89 %-95 %] 94 %  BP: (110-130)/(68-85) 110/73     Weight: 81.2 kg (179 lb)  Body mass index is 33.82 kg/m².    Physical Exam    Constitutional: She is oriented to person, place, and time. She appears well-developed and well-nourished. No distress.   HENT:   Head: Normocephalic.   Mouth/Throat: No oropharyngeal exudate.   Eyes: Pupils are equal, round, and reactive to light. Conjunctivae and EOM are normal.   Neck: Normal range of motion. Neck supple. No JVD present.   Cardiovascular: Normal rate, regular rhythm and normal heart sounds. Exam reveals no gallop and no friction rub.   No murmur heard.  Pulmonary/Chest: Effort normal and breath sounds normal. No respiratory distress. She has no wheezes. She has no rales.   Abdominal: Soft. Bowel sounds are normal. She exhibits no distension. There is no tenderness. There is no guarding.   Musculoskeletal: Normal range of motion. She exhibits no edema, tenderness or deformity.   Neurological: She is alert and oriented to person, place, and time. She displays normal reflexes. No cranial nerve deficit or sensory deficit. She exhibits normal muscle tone. Coordination normal.   Skin: Skin is warm. Capillary refill takes less than 2 seconds. No rash noted. She is not diaphoretic. No erythema.   Psychiatric: She has a normal mood and affect. Her behavior is normal. Judgment and thought content normal.       Significant Labs: All pertinent labs within the past 24 hours have been reviewed.    Significant Imaging: I have reviewed and interpreted all pertinent imaging results/findings within the past 24 hours.

## 2020-04-25 NOTE — ANESTHESIA POSTPROCEDURE EVALUATION
Anesthesia Post Evaluation    Patient: Radha Akbar    Procedure(s) Performed: Procedure(s) (LRB):   SECTION (N/A)    Final Anesthesia Type: spinal    Patient location during evaluation: floor  Patient participation: Yes- Able to Participate  Level of consciousness: awake and alert and oriented  Post-procedure vital signs: reviewed and stable  Pain management: adequate  Airway patency: patent    PONV status at discharge: No PONV  Anesthetic complications: no      Cardiovascular status: blood pressure returned to baseline, hemodynamically stable and stable  Respiratory status: unassisted, spontaneous ventilation and room air  Hydration status: euvolemic  Follow-up not needed.          Vitals Value Taken Time   /73 2020  8:21 PM   Temp 36.8 °C (98.2 °F) 2020  7:45 PM   Pulse 79 2020  8:21 PM   Resp 18 2020  7:45 PM   SpO2 94 % 2020  7:45 PM         No case tracking events are documented in the log.      Pain/Fabian Score: Pain Rating Prior to Med Admin: 6 (2020  8:56 PM)  Pain Rating Post Med Admin: 2 (2020 10:39 AM)

## 2020-04-25 NOTE — PROGRESS NOTES
[facility] CASE [facility]NAGEMENT                  WRITTEN DISCHARGE INFORMATION      APPOINTMENTS AND RESOURCES TO HELP YOU MANAGE YOUR CARE AT HOME BASED ON YOUR PREFERENCES:  (If an appointment is not scheduled for you when you leave the hospital, call your doctor to schedule a follow up visit within a week)    Follow-up Information     Harpreet Melgoza MD. Schedule an appointment as soon as possible for a visit in 4 weeks.    Specialty:  Obstetrics and Gynecology  Contact information:  [address]  [address]  [place] [address]  [phone]             [facility]. Schedule an appointment as soon as possible for a visit in 1 week.    Why:  Please schedule follow-up appointment with [facility] as soon as possible following your discharge.   Contact information:  [address]  [place] [address]  [phone]                   Healthy Living Instructions to HELP MANAGE YOUR CARE AT HOME:  Things You are responsible for:  1.    Getting your prescriptions filled   2.    Taking your medications as directed, DO NOT MISS ANY DOSES!  3.    Following the diet and exercise recommended by your doctor  4.    Going to your follow-up doctor appointment. This is important because it allows the doctor to monitor your progress and determine if any changes need to made to your treatment plan.  5. If you have any questions about MANAGING YOUR CARE AT HOME Call the Nurse Care Line for 24/7 Assistance [phone]       Please answer any calls you may receive from [facility]. We want to continue to support you as you manage your healthcare needs. [facility] is happy to have the opportunity to serve you.      Thank you for choosing [facility] for your healthcare needs!    Your [facility] Case Management Team,    Shruti David LMSW

## 2020-04-25 NOTE — ASSESSMENT & PLAN NOTE
Patient currently with normal vital signs and O2 sats normal on room air.  No respiratory significant symptoms at this time continue supportive care.  Placed on contact and airborne precautions.  Check baseline COVID labs.  However does not appear to be significantly ill from the Coronavirus over a week out from symptoms.  chest x ray consistent with viral  Pneumonia,started on Rocephin,zithromax,stable on NC O 2,prn IV lasix,She is stable on 2 liter Nc O 2,leg US show no DVT.  Patient is table on RA,afebrile,stable for discharge from  stand point regarding COVID,no need for Abx,

## 2020-04-25 NOTE — DISCHARGE SUMMARY
[facility]    Discharge Summary  Obstetrics & Gynecology      Patient Name:  Radha Akabr  MRN:  [id number]  Admission Date:  2020  Discharge Date:  2020  Hospital Length of Stay:  4  Admitting Physician:  Jamie Tijerina Mai, MD  Discharging Physician:  Harpreet Melgoza MD  Date of Delivery:  2020    Admitting Diagnosis:       Nicole IUP at 38w1d in active labor  Non-reassuring FHT  Previous   Declined TOLAC  COVID 19 positive on 2020  Viral pneumonia  Elevated liver function test     Discharge Diagnosis:    Nicole IUP at term s/p repeat low transverse   COVID 19 positive on 2020  Viral pneumonia, improved, O2 sat acceptable on room air  Elevated liver function test, trending down, asymptomatic    Procedure performed:  Repeat low transverse  delivery via vertical skin incision    Consults:  Hospital medicine for COVID-19    Brief Hospital Course:      See hortencia for complete details.  IUP at 38w1d with previous  who presented to L&D in active.  Pt known positive COVID 19.  Contact precautions and PPE used by staff.  NST showed fetal HR decelerations.  Care per on call MD, Dr Engel.   Decision was made to proceed with repeat .  Pt then underwent repeat  delivery.  While in the OR, pt changed her mind, declined tubal ligation.  Surgery was uncomplicated.  See operative report for further detail.  Baby was transferred to NICU for care and isolation due to maternal COVID status.  Hospital medicine consulted for positive COVID 19.  After postop recovery on L&D, pt was transferred to COVID unit in hospital/med surg.   Chest x ray consistent with viral pneumonia.  Pt was started on rocephin, zithromax, stable on NC O2, prn IV lasix.  US legs show no DVT.  Pt slowed weaned off of 02 on POD3.  Pt was cleared for discharged by medicine on POD4.  Prior to discharge, pt was comfortable, asymptomatic from COVID, O2 sat was  acceptable range, pt did not require O2.  Pt did not require antibx.  Otherwise, the remainder of postop course was overall uncomplicated.  Prior to discharge, pt was without major complaints.  Pt pain was well controlled.  Pt was ambulating, tolerating a regular diet, voiding without difficulty.  Pt lochia was light.  Pt vital signs where physiologic and stable.  Pt physical exam showed no acute findings.  Pt had satisfied routine postpartum discharge criteria and expressed the desire to be discharge from the hospital.   assisted with pt care.    Pertinent Labs or Studies:      Recent Results (from the past 336 hour(s))   CBC auto differential    Collection Time: 04/24/20  6:03 PM   Result Value Ref Range    WBC 13.17 (H) 3.90 - 12.70 K/uL    Hemoglobin 12.2 12.0 - 16.0 g/dL    Hematocrit 38.1 37.0 - 48.5 %    Platelets 425 (H) 150 - 350 K/uL   CBC auto differential    Collection Time: 04/22/20  9:35 AM   Result Value Ref Range    WBC 15.27 (H) 3.90 - 12.70 K/uL    Hemoglobin 13.1 12.0 - 16.0 g/dL    Hematocrit 40.6 37.0 - 48.5 %    Platelets 393 (H) 150 - 350 K/uL   CBC with Automated Differential    Collection Time: 04/21/20 11:45 PM   Result Value Ref Range    WBC 14.22 (H) 3.90 - 12.70 K/uL    Hemoglobin 14.9 12.0 - 16.0 g/dL    Hematocrit 48.5 37.0 - 48.5 %    Platelets 447 (H) 150 - 350 K/uL     BMP  Lab Results   Component Value Date     04/22/2020    K 4.2 04/22/2020     (H) 04/22/2020    CO2 21 (L) 04/22/2020    BUN 6 04/22/2020    CREATININE 0.9 04/22/2020    CALCIUM 8.0 (L) 04/22/2020    ANIONGAP 10 04/22/2020    ESTGFRAFRICA >60 04/22/2020    EGFRNONAA >60 04/22/2020     Lab Results   Component Value Date     (H) 04/22/2020     (H) 04/22/2020    ALKPHOS 265 (H) 04/22/2020    BILITOT 4.7 (H) 04/22/2020     ABO:  O POS    Discharge Exam:      Temp:  [97.6 °F (36.4 °C)-98.2 °F (36.8 °C)] 98.2 °F (36.8 °C)  Pulse:  [59-80] 59  Resp:  [17-20] 17  SpO2:  [90 %-95 %]  "95 %  BP: (110-130)/(68-83) 126/83    /83 (BP Location: Left arm, Patient Position: Lying)   Pulse (!) 59   Temp 98.2 °F (36.8 °C) (Oral)   Resp 17   Ht 5' 1" (1.549 m)   Wt 81.2 kg (179 lb)   LMP 07/29/2019 (Exact Date)   SpO2 95%   Breastfeeding? No   BMI 33.82 kg/m²     GENERAL:  No acute distress. Alert and oriented x 3.   HEENT:  Normocephalic. EOMI, PERRLA. No scleral icterus. Neck supple. Moist mucus membranes.   LUNGS:  Clear to auscultation bilaterally.   HEART:  Regular rate and rhythm with physiologic heart sounds.   ABDOMEN:  Soft, appropriately tender, non-distended, no guarding, rigidity, or rebound.   FUNDUS:  Firm, nontender below the umbilicus.  INCISION:  Clean, dry, & intact without signs of infection.   EXTREMITIES:  No cramping, claudication.  Trace edema. Good skin turgor. +2 distal pulses, symmetric. Full range of motion.   NEUROLOGIC:  Grossly intact bilaterally.   PSYCH:  Mood and affect appropriate.   PERINEUM:  Intact with light lochia.    Discharge Condition:  Stable      Discharge Disposition:  Home       Discharge Medications:     Resume prenatal vitamins 1 tab po qday  Percocet 5/325 mg 1 tab po q4-6h prn breakthrough pain, disp #30, refill 0    Discharge Activites:      Resume activities as tolerated with adequate rest  Pelvic rest for 6 weeks   No heavy lifting greater 10 lbs or strenuous activities   Showers only  Wound care instructions and precautions given   Do NOT driving and operating machinery while taking narcotics or other sedative medications, pt voiced understanding.    Discharge Diet:  Regular diet    Discharge Instructions:      Pt was instructed to contact the clinic or emergency department for any concerns including but not limited to an increase in her lochia, abdominal pain, foul vaginal discharge, weakness, decrease activity level, decrease appetite, feeling sad or hopeless, inability to care for her baby, breast pain or infection, difficulty with " voiding or having bowel movements, perineal pain or breakdown, wound breakdown, fever >100.4 or abnormal vital signs, shortness of breath, chest pain, dizziness or feeling faint, pain or swelling in her extremities, headaches not relieved with rest and Tylenol, vision changes, seizure activities, abnormal bleeding/bruising, or any other health concerns.  Post-partum care instructions and precautions, labs/studies, clinical/delivery findings, and hospital course reviewed with the patient prior to discharge.  All of her questions were answered to her satisfaction.  Pt voiced understanding.    Discussed with pt our local health care system response to Covid-19.  Discussed preventive measures, social distancing, and parameters for testing.  Clinic and hospital mitigation policies and preventative strategies discussed.    Follow-up Visit:      Follow-up Information     Harpreet Melgoza MD. Schedule an appointment as soon as possible for a visit in 4 weeks.    Specialty:  Obstetrics and Gynecology  Contact information:  [address] [place] [address]  [phone]             [facility]. Schedule an appointment as soon as possible for a visit in 1 week.    Why:  Please schedule follow-up appointment with [facility] as soon as possible following your discharge.   Contact information:  [address] [place] [address]  [phone]                 Harpreet Melgoza MD

## 2020-04-25 NOTE — PLAN OF CARE
04/25/20 1303   Discharge Assessment   Assessment Type Discharge Planning Assessment   Confirmed/corrected address and phone number on facesheet? Yes   Assessment information obtained from? Medical Record   Expected Length of Stay (days) 2   Communicated expected length of stay with patient/caregiver yes   Prior to hospitilization cognitive status: Alert/Oriented   Prior to hospitalization functional status: Independent   Current cognitive status: Alert/Oriented   Current Functional Status: Partially Dependent   Facility Arrived From: home   Lives With significant other  (Kwadwo [phone])   Is patient able to care for self after discharge? Unable to determine at this time (comments)   Who are your caregiver(s) and their phone number(s)? Kwadwo [phone]   Patient's perception of discharge disposition home or selfcare   Readmission Within the Last 30 Days no previous admission in last 30 days   Patient currently being followed by outpatient case management? No   Patient currently receives any other outside agency services? No   Equipment Currently Used at Home none   Do you have any problems affording any of your prescribed medications? No   Is the patient taking medications as prescribed? yes   Does the patient have transportation home? Yes   Transportation Anticipated family or friend will provide   Dialysis Name and Scheduled days none   Does the patient receive services at the Coumadin Clinic? No   Discharge Plan A Home with family   DME Needed Upon Discharge  none   Patient/Family in Agreement with Plan yes       [id number] - [place] - [address] AT [facility] [address]  [address]  [place] [address]  Phone: [phone] Fax: [phone]    [facility]  [address] [place], [place] [address] [place] [address]  Phone: [phone] Fax: [phone]

## 2020-04-25 NOTE — NURSING
Home O2 Need Assessment:    Pt 91% on RA at rest    Pt 92-93% on RA upon Exertion.    No need for home O2

## 2020-04-25 NOTE — NURSING
D/C instruction given via Italian speaking RN Marbella. Pt was unaware of the need for quarantine post hospital stay, detailed quarantine instructions provided. Pt expressed understanding of all education and instruction provided.  Per pt request, Kwadwo Alva, will be picking up their  from [facility] upon baby's D/C. [employer] employee ID [id number] was used via  line to provide quarantine education for pt and for  safety regarding quarantined mother. Mother is Covid-19 + and baby born via  on 20 is covid-19 -.

## 2020-04-29 LAB
FINAL PATHOLOGIC DIAGNOSIS: NORMAL
GROSS: NORMAL

## 2020-06-18 ENCOUNTER — OFFICE VISIT (OUTPATIENT)
Dept: OBSTETRICS AND GYNECOLOGY | Facility: CLINIC | Age: 25
End: 2020-06-18
Payer: MEDICAID

## 2020-06-18 VITALS
HEART RATE: 62 BPM | DIASTOLIC BLOOD PRESSURE: 60 MMHG | SYSTOLIC BLOOD PRESSURE: 100 MMHG | RESPIRATION RATE: 16 BRPM | HEIGHT: 61 IN | BODY MASS INDEX: 33.82 KG/M2

## 2020-06-18 DIAGNOSIS — Z30.09 FAMILY PLANNING COUNSELING: ICD-10-CM

## 2020-06-18 PROCEDURE — 99999 PR PBB SHADOW E&M-EST. PATIENT-LVL III: CPT | Mod: PBBFAC,,, | Performed by: OBSTETRICS & GYNECOLOGY

## 2020-06-18 PROCEDURE — 59430 PR CARE AFTER DELIVERY ONLY: ICD-10-PCS | Mod: ,,, | Performed by: OBSTETRICS & GYNECOLOGY

## 2020-06-18 PROCEDURE — 99213 OFFICE O/P EST LOW 20 MIN: CPT | Mod: PBBFAC | Performed by: OBSTETRICS & GYNECOLOGY

## 2020-06-18 PROCEDURE — 99999 PR PBB SHADOW E&M-EST. PATIENT-LVL III: ICD-10-PCS | Mod: PBBFAC,,, | Performed by: OBSTETRICS & GYNECOLOGY

## 2020-06-18 NOTE — PROGRESS NOTES
"[facility]  Ambulatory Clinic  Obstetrics & Gynecology    Date of Visit:  2020    Chief Complaint:  Post-partum visit    Subjective:      Radha Akbar is a 24 y.o.  who is s/p repeat low transverse  delivery at term here for post-partum visit.  Pt has no major complaints today.   Pt developed COVID infection prior to delivery.  Pt is asymptomatic and denies any SOB, weakness, URI sxs, or CP.  Pt reports no sick contact at home.  Otherwise, pt reports doing well since delivery.    Pt reports baby is doing well and she is breast feeding without difficulty.    Her lochia resolved.    Pt denies abdominal/pelvic pain, fevers, abnormal vaginal discharge, symptoms of post-partum blues or depression, breast complaints, GI or urinary compliants.    Pt is requesting Nexplanon for contraception.    Review of Systems:      CONSTITUTIONAL:  No fever, chills, weakness, fatigue, decreased activity  HEENT: No headaches, vision changes  LUNGS:  No shortness of breath  HEART:  No palpitations, chest pain, abnormal swelling  BREAST:  No lump, pain, redness, skin changes  GI:  No nausea, vomiting, diarrhea, constipation, abdomen pain, blood in stool  URINARY:  No dysuria, hematuria, frequency  SKIN:  No rash, bruising  NEURO:  No headache, weakness  PSYCH:  No anxiety, depression, suicidal or homicidal ideations    Objective:     /60   Pulse 62   Resp 16   Ht 5' 1" (1.549 m)   LMP 2019 (Exact Date)   Breastfeeding No   BMI 33.82 kg/m²      GENERAL:  NAD, A&Ox3, well nourished.  HEENT:  NCAT, EOMI, moist mucus membranes.  Neck supple without masses.  BREAST:  Symmetric, non-tender, no abnormal masses, skin changes, or discharge.  LUNGS:  CTA-B.  HEART:  RRR with physiologic heart sounds.  ABDOMEN:  Soft, non-tender, non-distended without any guarding, rigidity or rebound.  Normoactive bowel sounds.  Incision - clean, dry, and intact.  Healing appropriately without " signs of infection.  Fundus - firm, non-tender, below umbilicus.  EXT:  Symmetric. No cramping, claudication, or edema. +2 distal pulses. FROM.  SKIN:  No rashes, good turgor & capillary refill.  NEURO:  Grossly intact bilaterally. +2 DTR.  PSYCH:  Mood & affect appropriate.       PELVIC:  Normal female external genitalia without any obvious lesions.  Perinuem intact.  Adequate perineal body.  Normal urethral meatus.  No gross lymphadenopathy.   Vagina:  Vaginal vault with good support, lochia resolved.     Cervix:  No cervical motion tenderness, discharge, or obvious lesions.    Uterus:  Small, non-tender, normal contour.    Adnexa:  No masses, non-tender.    Rectal:  Patient declined. No obvious external lesions.     Chaperone present for exam.    Laboratory Data:     Lab Results   Component Value Date    WBC 13.17 (H) 2020    HGB 12.2 2020    HCT 38.1 2020    MCV 91 2020     (H) 2020     O POS    Assessment:     1. 24 y.o.  s/p cesearean delivery at term - doing well post-partum  2. Family planning - order Nexplanon  3. COVID infection on 20, resolved, asxs    Plan:    Post-partum care instructions and precautions reviewed.  Wound care instructions.  Pelvic rest x 6 wks post-partum.  Continue prenatal vitamins.    We discussed in detail her contraceptive options.  After an extensive discussion, pt is requesting Nexplanon.  Risks, benefits, and alternatives to Nexplanon discussed.  Will order Nexplanon pending insurance benefits verification process.  Continue condoms in meantime.    COVID precautions.  Discussed with pt our local health care system response to Covid-19.  Discussed preventive measures, social distancing, and parameters for testing.  Clinic and hospital mitigation policies and preventative strategies discussed.    F/u with PCP for health maintenance.  Encourage healthy lifestyle modifications.    Will schedule pt for Nexplanon insertion once received  by office.     Return sooner as needed.  Pt voiced understanding.       Harpreet Melgoza MD

## 2020-06-24 ENCOUNTER — CLINICAL SUPPORT (OUTPATIENT)
Dept: OBSTETRICS AND GYNECOLOGY | Facility: CLINIC | Age: 25
End: 2020-06-24
Payer: MEDICAID

## 2020-06-24 VITALS — HEIGHT: 61 IN | BODY MASS INDEX: 33.82 KG/M2

## 2020-06-24 DIAGNOSIS — Z30.09 FAMILY PLANNING COUNSELING: Primary | ICD-10-CM

## 2020-06-24 DIAGNOSIS — Z30.42 ENCOUNTER FOR DEPO-PROVERA CONTRACEPTION: ICD-10-CM

## 2020-06-24 PROCEDURE — 99999 PR PBB SHADOW E&M-EST. PATIENT-LVL II: ICD-10-PCS | Mod: PBBFAC,,,

## 2020-06-24 PROCEDURE — 96372 THER/PROPH/DIAG INJ SC/IM: CPT | Mod: PBBFAC

## 2020-06-24 PROCEDURE — 99999 PR PBB SHADOW E&M-EST. PATIENT-LVL II: CPT | Mod: PBBFAC,,,

## 2020-06-24 RX ORDER — MEDROXYPROGESTERONE ACETATE 150 MG/ML
150 INJECTION, SUSPENSION INTRAMUSCULAR
Status: SHIPPED | OUTPATIENT
Start: 2020-06-24

## 2020-06-24 RX ADMIN — MEDROXYPROGESTERONE ACETATE 150 MG: 150 INJECTION, SUSPENSION INTRAMUSCULAR at 09:06

## 2021-04-30 ENCOUNTER — HOSPITAL ENCOUNTER (EMERGENCY)
Facility: HOSPITAL | Age: 26
Discharge: LEFT BEFORE DISCHARGE | End: 2021-04-30

## 2021-04-30 VITALS
BODY MASS INDEX: 30.21 KG/M2 | OXYGEN SATURATION: 99 % | SYSTOLIC BLOOD PRESSURE: 124 MMHG | HEIGHT: 61 IN | RESPIRATION RATE: 17 BRPM | WEIGHT: 160 LBS | TEMPERATURE: 99 F | DIASTOLIC BLOOD PRESSURE: 78 MMHG | HEART RATE: 95 BPM

## 2021-04-30 DIAGNOSIS — R10.13 EPIGASTRIC ABDOMINAL PAIN: ICD-10-CM

## 2021-04-30 LAB
B-HCG UR QL: NEGATIVE
CTP QC/QA: YES

## 2021-04-30 PROCEDURE — 93005 ELECTROCARDIOGRAM TRACING: CPT

## 2021-04-30 PROCEDURE — 93010 ELECTROCARDIOGRAM REPORT: CPT | Mod: ,,, | Performed by: INTERNAL MEDICINE

## 2021-04-30 PROCEDURE — 81025 URINE PREGNANCY TEST: CPT | Performed by: EMERGENCY MEDICINE

## 2021-04-30 PROCEDURE — 93010 EKG 12-LEAD: ICD-10-PCS | Mod: ,,, | Performed by: INTERNAL MEDICINE

## 2021-05-01 ENCOUNTER — HOSPITAL ENCOUNTER (EMERGENCY)
Facility: HOSPITAL | Age: 26
Discharge: HOME | End: 2021-05-01
Attending: EMERGENCY MEDICINE

## 2021-05-01 VITALS
RESPIRATION RATE: 18 BRPM | SYSTOLIC BLOOD PRESSURE: 123 MMHG | OXYGEN SATURATION: 100 % | WEIGHT: 160 LBS | HEART RATE: 87 BPM | DIASTOLIC BLOOD PRESSURE: 73 MMHG | TEMPERATURE: 99 F | BODY MASS INDEX: 30.21 KG/M2 | HEIGHT: 61 IN

## 2021-05-01 DIAGNOSIS — R10.13 EPIGASTRIC PAIN: Primary | ICD-10-CM

## 2021-05-01 DIAGNOSIS — K52.9 GASTROENTERITIS: ICD-10-CM

## 2021-05-01 LAB
ALBUMIN SERPL BCP-MCNC: 3.9 G/DL (ref 3.5–5.2)
ALP SERPL-CCNC: 76 U/L (ref 55–135)
ALT SERPL W/O P-5'-P-CCNC: 8 U/L (ref 10–44)
ANION GAP SERPL CALC-SCNC: 8 MMOL/L (ref 8–16)
AST SERPL-CCNC: 15 U/L (ref 10–40)
B-HCG UR QL: NEGATIVE
BACTERIA GENITAL QL WET PREP: ABNORMAL
BASOPHILS # BLD AUTO: 0.03 K/UL (ref 0–0.2)
BASOPHILS NFR BLD: 0.2 % (ref 0–1.9)
BILIRUB SERPL-MCNC: 0.7 MG/DL (ref 0.1–1)
BILIRUB UR QL STRIP: NEGATIVE
BUN SERPL-MCNC: 11 MG/DL (ref 6–20)
CALCIUM SERPL-MCNC: 8.4 MG/DL (ref 8.7–10.5)
CHLORIDE SERPL-SCNC: 105 MMOL/L (ref 95–110)
CLARITY UR: CLEAR
CLUE CELLS VAG QL WET PREP: ABNORMAL
CO2 SERPL-SCNC: 26 MMOL/L (ref 23–29)
COLOR UR: YELLOW
CREAT SERPL-MCNC: 0.7 MG/DL (ref 0.5–1.4)
CTP QC/QA: YES
DIFFERENTIAL METHOD: ABNORMAL
EOSINOPHIL # BLD AUTO: 0.1 K/UL (ref 0–0.5)
EOSINOPHIL NFR BLD: 0.6 % (ref 0–8)
ERYTHROCYTE [DISTWIDTH] IN BLOOD BY AUTOMATED COUNT: 12.9 % (ref 11.5–14.5)
EST. GFR  (AFRICAN AMERICAN): >60 ML/MIN/1.73 M^2
EST. GFR  (NON AFRICAN AMERICAN): >60 ML/MIN/1.73 M^2
FILAMENT FUNGI VAG WET PREP-#/AREA: ABNORMAL
GLUCOSE SERPL-MCNC: 92 MG/DL (ref 70–110)
GLUCOSE UR QL STRIP: NEGATIVE
HCT VFR BLD AUTO: 36.2 % (ref 37–48.5)
HGB BLD-MCNC: 12.1 G/DL (ref 12–16)
HGB UR QL STRIP: ABNORMAL
IMM GRANULOCYTES # BLD AUTO: 0.05 K/UL (ref 0–0.04)
IMM GRANULOCYTES NFR BLD AUTO: 0.4 % (ref 0–0.5)
KETONES UR QL STRIP: NEGATIVE
LEUKOCYTE ESTERASE UR QL STRIP: NEGATIVE
LIPASE SERPL-CCNC: 13 U/L (ref 4–60)
LYMPHOCYTES # BLD AUTO: 3.7 K/UL (ref 1–4.8)
LYMPHOCYTES NFR BLD: 30.5 % (ref 18–48)
MAGNESIUM SERPL-MCNC: 2.1 MG/DL (ref 1.6–2.6)
MCH RBC QN AUTO: 29.3 PG (ref 27–31)
MCHC RBC AUTO-ENTMCNC: 33.4 G/DL (ref 32–36)
MCV RBC AUTO: 88 FL (ref 82–98)
MONOCYTES # BLD AUTO: 0.6 K/UL (ref 0.3–1)
MONOCYTES NFR BLD: 5.3 % (ref 4–15)
NEUTROPHILS # BLD AUTO: 7.6 K/UL (ref 1.8–7.7)
NEUTROPHILS NFR BLD: 63 % (ref 38–73)
NITRITE UR QL STRIP: NEGATIVE
NRBC BLD-RTO: 0 /100 WBC
PH UR STRIP: 7 [PH] (ref 5–8)
PLATELET # BLD AUTO: 416 K/UL (ref 150–450)
PMV BLD AUTO: 10.5 FL (ref 9.2–12.9)
POTASSIUM SERPL-SCNC: 3.8 MMOL/L (ref 3.5–5.1)
PROT SERPL-MCNC: 7.5 G/DL (ref 6–8.4)
PROT UR QL STRIP: ABNORMAL
RBC # BLD AUTO: 4.13 M/UL (ref 4–5.4)
SODIUM SERPL-SCNC: 139 MMOL/L (ref 136–145)
SP GR UR STRIP: 1.02 (ref 1–1.03)
SPECIMEN SOURCE: ABNORMAL
T VAGINALIS GENITAL QL WET PREP: ABNORMAL
URN SPEC COLLECT METH UR: ABNORMAL
UROBILINOGEN UR STRIP-ACNC: NEGATIVE EU/DL
WBC # BLD AUTO: 12.08 K/UL (ref 3.9–12.7)
WBC #/AREA VAG WET PREP: ABNORMAL
YEAST GENITAL QL WET PREP: ABNORMAL

## 2021-05-01 PROCEDURE — 83690 ASSAY OF LIPASE: CPT | Performed by: PHYSICIAN ASSISTANT

## 2021-05-01 PROCEDURE — 96361 HYDRATE IV INFUSION ADD-ON: CPT

## 2021-05-01 PROCEDURE — 96374 THER/PROPH/DIAG INJ IV PUSH: CPT

## 2021-05-01 PROCEDURE — 81003 URINALYSIS AUTO W/O SCOPE: CPT | Performed by: PHYSICIAN ASSISTANT

## 2021-05-01 PROCEDURE — 99284 EMERGENCY DEPT VISIT MOD MDM: CPT | Mod: 25

## 2021-05-01 PROCEDURE — 25000003 PHARM REV CODE 250: Performed by: PHYSICIAN ASSISTANT

## 2021-05-01 PROCEDURE — 83735 ASSAY OF MAGNESIUM: CPT | Performed by: PHYSICIAN ASSISTANT

## 2021-05-01 PROCEDURE — 87591 N.GONORRHOEAE DNA AMP PROB: CPT | Performed by: PHYSICIAN ASSISTANT

## 2021-05-01 PROCEDURE — 85025 COMPLETE CBC W/AUTO DIFF WBC: CPT | Performed by: PHYSICIAN ASSISTANT

## 2021-05-01 PROCEDURE — 87491 CHLMYD TRACH DNA AMP PROBE: CPT | Performed by: PHYSICIAN ASSISTANT

## 2021-05-01 PROCEDURE — 87210 SMEAR WET MOUNT SALINE/INK: CPT | Performed by: PHYSICIAN ASSISTANT

## 2021-05-01 PROCEDURE — 80053 COMPREHEN METABOLIC PANEL: CPT | Performed by: PHYSICIAN ASSISTANT

## 2021-05-01 PROCEDURE — 96375 TX/PRO/DX INJ NEW DRUG ADDON: CPT

## 2021-05-01 PROCEDURE — 63600175 PHARM REV CODE 636 W HCPCS: Performed by: PHYSICIAN ASSISTANT

## 2021-05-01 PROCEDURE — 81025 URINE PREGNANCY TEST: CPT | Performed by: PHYSICIAN ASSISTANT

## 2021-05-01 RX ORDER — FAMOTIDINE 20 MG/1
20 TABLET, FILM COATED ORAL 2 TIMES DAILY
Qty: 60 TABLET | Refills: 0 | Status: SHIPPED | OUTPATIENT
Start: 2021-05-01 | End: 2021-05-31

## 2021-05-01 RX ORDER — ONDANSETRON 2 MG/ML
4 INJECTION INTRAMUSCULAR; INTRAVENOUS
Status: COMPLETED | OUTPATIENT
Start: 2021-05-01 | End: 2021-05-01

## 2021-05-01 RX ORDER — ONDANSETRON 4 MG/1
4 TABLET, ORALLY DISINTEGRATING ORAL EVERY 6 HOURS PRN
Qty: 20 TABLET | Refills: 0 | Status: SHIPPED | OUTPATIENT
Start: 2021-05-01

## 2021-05-01 RX ORDER — FAMOTIDINE 10 MG/ML
20 INJECTION INTRAVENOUS
Status: COMPLETED | OUTPATIENT
Start: 2021-05-01 | End: 2021-05-01

## 2021-05-01 RX ORDER — DEXTROMETHORPHAN HYDROBROMIDE, GUAIFENESIN 5; 100 MG/5ML; MG/5ML
650 LIQUID ORAL 3 TIMES DAILY PRN
Qty: 20 TABLET | Refills: 0 | Status: SHIPPED | OUTPATIENT
Start: 2021-05-01

## 2021-05-01 RX ADMIN — FAMOTIDINE 20 MG: 10 INJECTION INTRAVENOUS at 08:05

## 2021-05-01 RX ADMIN — ONDANSETRON 4 MG: 2 INJECTION INTRAMUSCULAR; INTRAVENOUS at 08:05

## 2021-05-01 RX ADMIN — SODIUM CHLORIDE, SODIUM LACTATE, POTASSIUM CHLORIDE, AND CALCIUM CHLORIDE 1000 ML: .6; .31; .03; .02 INJECTION, SOLUTION INTRAVENOUS at 08:05

## 2021-05-03 LAB
C TRACH DNA SPEC QL NAA+PROBE: NOT DETECTED
N GONORRHOEA DNA SPEC QL NAA+PROBE: NOT DETECTED

## 2022-03-08 ENCOUNTER — HOSPITAL ENCOUNTER (EMERGENCY)
Facility: HOSPITAL | Age: 27
Discharge: HOME | End: 2022-03-09
Attending: INTERNAL MEDICINE
Payer: MEDICAID

## 2022-03-08 DIAGNOSIS — Z34.90 EARLY STAGE OF PREGNANCY: Primary | ICD-10-CM

## 2022-03-08 DIAGNOSIS — R10.9 ABDOMINAL PAIN DURING PREGNANCY IN FIRST TRIMESTER: ICD-10-CM

## 2022-03-08 DIAGNOSIS — O26.891 PREGNANCY HEADACHE IN FIRST TRIMESTER: ICD-10-CM

## 2022-03-08 DIAGNOSIS — O26.891 ABDOMINAL PAIN DURING PREGNANCY IN FIRST TRIMESTER: ICD-10-CM

## 2022-03-08 DIAGNOSIS — O21.9 NAUSEA/VOMITING IN PREGNANCY: ICD-10-CM

## 2022-03-08 DIAGNOSIS — R51.9 PREGNANCY HEADACHE IN FIRST TRIMESTER: ICD-10-CM

## 2022-03-08 PROCEDURE — 96374 THER/PROPH/DIAG INJ IV PUSH: CPT

## 2022-03-08 PROCEDURE — 96375 TX/PRO/DX INJ NEW DRUG ADDON: CPT

## 2022-03-08 PROCEDURE — 96361 HYDRATE IV INFUSION ADD-ON: CPT

## 2022-03-08 PROCEDURE — 81025 URINE PREGNANCY TEST: CPT | Performed by: PHYSICIAN ASSISTANT

## 2022-03-08 PROCEDURE — 99285 EMERGENCY DEPT VISIT HI MDM: CPT | Mod: 25

## 2022-03-08 RX ORDER — ONDANSETRON 2 MG/ML
4 INJECTION INTRAMUSCULAR; INTRAVENOUS
Status: COMPLETED | OUTPATIENT
Start: 2022-03-08 | End: 2022-03-09

## 2022-03-09 VITALS
DIASTOLIC BLOOD PRESSURE: 74 MMHG | OXYGEN SATURATION: 100 % | HEART RATE: 97 BPM | HEIGHT: 61 IN | RESPIRATION RATE: 20 BRPM | TEMPERATURE: 99 F | BODY MASS INDEX: 32.1 KG/M2 | WEIGHT: 170 LBS | SYSTOLIC BLOOD PRESSURE: 114 MMHG

## 2022-03-09 LAB
ALBUMIN SERPL BCP-MCNC: 3.9 G/DL (ref 3.5–5.2)
ALP SERPL-CCNC: 77 U/L (ref 55–135)
ALT SERPL W/O P-5'-P-CCNC: 8 U/L (ref 10–44)
ANION GAP SERPL CALC-SCNC: 4 MMOL/L (ref 8–16)
AST SERPL-CCNC: 16 U/L (ref 10–40)
B-HCG UR QL: POSITIVE
BASOPHILS # BLD AUTO: 0.04 K/UL (ref 0–0.2)
BASOPHILS NFR BLD: 0.3 % (ref 0–1.9)
BILIRUB SERPL-MCNC: 0.7 MG/DL (ref 0.1–1)
BILIRUB UR QL STRIP: NEGATIVE
BUN SERPL-MCNC: 11 MG/DL (ref 6–20)
CALCIUM SERPL-MCNC: 9 MG/DL (ref 8.7–10.5)
CHLORIDE SERPL-SCNC: 109 MMOL/L (ref 95–110)
CLARITY UR: CLEAR
CO2 SERPL-SCNC: 25 MMOL/L (ref 23–29)
COLOR UR: YELLOW
CREAT SERPL-MCNC: 0.7 MG/DL (ref 0.5–1.4)
CTP QC/QA: YES
DIFFERENTIAL METHOD: ABNORMAL
EOSINOPHIL # BLD AUTO: 0.1 K/UL (ref 0–0.5)
EOSINOPHIL NFR BLD: 0.6 % (ref 0–8)
ERYTHROCYTE [DISTWIDTH] IN BLOOD BY AUTOMATED COUNT: 14.9 % (ref 11.5–14.5)
EST. GFR  (AFRICAN AMERICAN): >60 ML/MIN/1.73 M^2
EST. GFR  (NON AFRICAN AMERICAN): >60 ML/MIN/1.73 M^2
GLUCOSE SERPL-MCNC: 107 MG/DL (ref 70–110)
GLUCOSE UR QL STRIP: NEGATIVE
HCG INTACT+B SERPL-ACNC: 2933 MIU/ML
HCT VFR BLD AUTO: 34.1 % (ref 37–48.5)
HGB BLD-MCNC: 10.9 G/DL (ref 12–16)
HGB UR QL STRIP: NEGATIVE
IMM GRANULOCYTES # BLD AUTO: 0.04 K/UL (ref 0–0.04)
IMM GRANULOCYTES NFR BLD AUTO: 0.3 % (ref 0–0.5)
KETONES UR QL STRIP: NEGATIVE
LEUKOCYTE ESTERASE UR QL STRIP: NEGATIVE
LIPASE SERPL-CCNC: 19 U/L (ref 4–60)
LYMPHOCYTES # BLD AUTO: 4.6 K/UL (ref 1–4.8)
LYMPHOCYTES NFR BLD: 39 % (ref 18–48)
MCH RBC QN AUTO: 27.1 PG (ref 27–31)
MCHC RBC AUTO-ENTMCNC: 32 G/DL (ref 32–36)
MCV RBC AUTO: 85 FL (ref 82–98)
MONOCYTES # BLD AUTO: 0.6 K/UL (ref 0.3–1)
MONOCYTES NFR BLD: 5.5 % (ref 4–15)
NEUTROPHILS # BLD AUTO: 6.4 K/UL (ref 1.8–7.7)
NEUTROPHILS NFR BLD: 54.3 % (ref 38–73)
NITRITE UR QL STRIP: NEGATIVE
NRBC BLD-RTO: 0 /100 WBC
PH UR STRIP: 6 [PH] (ref 5–8)
PLATELET # BLD AUTO: 409 K/UL (ref 150–450)
PMV BLD AUTO: 10.2 FL (ref 9.2–12.9)
POTASSIUM SERPL-SCNC: 3.7 MMOL/L (ref 3.5–5.1)
PROT SERPL-MCNC: 7.9 G/DL (ref 6–8.4)
PROT UR QL STRIP: ABNORMAL
RBC # BLD AUTO: 4.02 M/UL (ref 4–5.4)
SODIUM SERPL-SCNC: 138 MMOL/L (ref 136–145)
SP GR UR STRIP: 1.03 (ref 1–1.03)
URN SPEC COLLECT METH UR: ABNORMAL
UROBILINOGEN UR STRIP-ACNC: NEGATIVE EU/DL
WBC # BLD AUTO: 11.74 K/UL (ref 3.9–12.7)

## 2022-03-09 PROCEDURE — 63600175 PHARM REV CODE 636 W HCPCS: Performed by: PHYSICIAN ASSISTANT

## 2022-03-09 PROCEDURE — 81003 URINALYSIS AUTO W/O SCOPE: CPT | Performed by: PHYSICIAN ASSISTANT

## 2022-03-09 PROCEDURE — 85025 COMPLETE CBC W/AUTO DIFF WBC: CPT | Performed by: PHYSICIAN ASSISTANT

## 2022-03-09 PROCEDURE — 84702 CHORIONIC GONADOTROPIN TEST: CPT | Performed by: PHYSICIAN ASSISTANT

## 2022-03-09 PROCEDURE — 25000003 PHARM REV CODE 250: Performed by: PHYSICIAN ASSISTANT

## 2022-03-09 PROCEDURE — 83690 ASSAY OF LIPASE: CPT | Performed by: PHYSICIAN ASSISTANT

## 2022-03-09 PROCEDURE — 80053 COMPREHEN METABOLIC PANEL: CPT | Performed by: PHYSICIAN ASSISTANT

## 2022-03-09 RX ORDER — DIPHENHYDRAMINE HYDROCHLORIDE 50 MG/ML
25 INJECTION INTRAMUSCULAR; INTRAVENOUS
Status: COMPLETED | OUTPATIENT
Start: 2022-03-09 | End: 2022-03-09

## 2022-03-09 RX ORDER — ACETAMINOPHEN 500 MG
1000 TABLET ORAL
Status: COMPLETED | OUTPATIENT
Start: 2022-03-09 | End: 2022-03-09

## 2022-03-09 RX ORDER — METOCLOPRAMIDE HYDROCHLORIDE 5 MG/ML
10 INJECTION INTRAMUSCULAR; INTRAVENOUS
Status: COMPLETED | OUTPATIENT
Start: 2022-03-09 | End: 2022-03-09

## 2022-03-09 RX ADMIN — ONDANSETRON 4 MG: 2 INJECTION INTRAMUSCULAR; INTRAVENOUS at 12:03

## 2022-03-09 RX ADMIN — SODIUM CHLORIDE 1000 ML: 0.9 INJECTION, SOLUTION INTRAVENOUS at 12:03

## 2022-03-09 RX ADMIN — METOCLOPRAMIDE 10 MG: 5 INJECTION, SOLUTION INTRAMUSCULAR; INTRAVENOUS at 12:03

## 2022-03-09 RX ADMIN — ACETAMINOPHEN 1000 MG: 500 TABLET ORAL at 12:03

## 2022-03-09 RX ADMIN — DIPHENHYDRAMINE HYDROCHLORIDE 25 MG: 50 INJECTION INTRAMUSCULAR; INTRAVENOUS at 12:03

## 2022-03-09 NOTE — ED PROVIDER NOTES
Encounter Date: 3/8/2022       History     Chief Complaint   Patient presents with    Abdominal Pain    Vomiting     Per language line, LUQ abdominal pain radiating down left leg accompanied by all day vomiting, reports burning with urination and frequency, denies vaginal bleeding or discharge     Chief Complaint:  Multiple medical complaints  History of  Present Illness: History obtained from patient. This 26 y.o. female A1 who is approximately 6 weeks 1 day gestation by LMP on 2022 with past medical history of anemia presents to the ED with multiple medical complaints including generalized headache, lower abdominal pain, bilateral flank pain, urinary frequency, nausea and vomiting for the last 3 days.  Patient was not aware that she was pregnant.  Denies vaginal bleeding, vaginal discharge, dysuria, hematuria, fever, chills.         Review of patient's allergies indicates:  No Known Allergies  Past Medical History:   Diagnosis Date    Anemia     Depression     Endometriosis of uterus 2020    diagnosed during      Past Surgical History:   Procedure Laterality Date     SECTION       SECTION N/A 2019    Procedure:  SECTION;  Surgeon: Ascencion Fajardo MD;  Location: [facility] L&D;  Service: OB/GYN;  Laterality: N/A;     SECTION N/A 2020    Procedure:  SECTION;  Surgeon: Jamie Tijerina Mai, MD;  Location: [facility] L&D OR;  Service: OB/GYN;  Laterality: N/A;     Family History   Problem Relation Age of Onset    Diabetes Mother      Social History     Tobacco Use    Smoking status: Never Smoker    Smokeless tobacco: Never Used   Substance Use Topics    Alcohol use: No    Drug use: No     Review of Systems   Constitutional: Negative for chills and fever.   HENT: Negative for congestion, rhinorrhea and sore throat.    Eyes: Negative for visual disturbance.   Respiratory: Negative for cough and shortness of breath.    Cardiovascular: Negative for  chest pain.   Gastrointestinal: Positive for abdominal pain, nausea and vomiting. Negative for diarrhea.   Genitourinary: Positive for frequency. Negative for dysuria, hematuria, vaginal bleeding and vaginal discharge.   Musculoskeletal: Positive for back pain.   Skin: Negative for rash.   Neurological: Positive for headaches. Negative for dizziness and weakness.       Physical Exam     Initial Vitals [03/08/22 2207]   BP Pulse Resp Temp SpO2   116/62 100 18 99 °F (37.2 °C) 100 %      MAP       --         Physical Exam    Nursing note and vitals reviewed.  Constitutional: She appears well-developed and well-nourished. No distress.   HENT:   Head: Normocephalic and atraumatic.   Right Ear: Tympanic membrane normal.   Left Ear: Tympanic membrane normal.   Nose: Nose normal.   Mouth/Throat: Uvula is midline, oropharynx is clear and moist and mucous membranes are normal.   Eyes: EOM are normal. Pupils are equal, round, and reactive to light.   Neck: Trachea normal and phonation normal. Neck supple. No stridor present.   Normal range of motion.   Full passive range of motion without pain.     Cardiovascular: Normal rate, regular rhythm and normal heart sounds. Exam reveals no gallop and no friction rub.    No murmur heard.  Pulmonary/Chest: Effort normal and breath sounds normal. No respiratory distress. She has no wheezes. She has no rhonchi. She has no rales.   Abdominal: Abdomen is soft. Bowel sounds are normal. There is abdominal tenderness in the suprapubic area.   No right CVA tenderness.  No left CVA tenderness. There is no rebound and no guarding.   Musculoskeletal:         General: Normal range of motion.      Cervical back: Full passive range of motion without pain, normal range of motion and neck supple. No rigidity. No spinous process tenderness or muscular tenderness. Normal range of motion.     Neurological: She is alert and oriented to person, place, and time. She has normal strength. No cranial nerve  deficit or sensory deficit.   Skin: Skin is warm and dry. Capillary refill takes less than 2 seconds.   Psychiatric: She has a normal mood and affect.         ED Course   Procedures  Labs Reviewed   URINALYSIS, REFLEX TO URINE CULTURE - Abnormal; Notable for the following components:       Result Value    Protein, UA Trace (*)     All other components within normal limits    Narrative:     Specimen Source->Urine   CBC W/ AUTO DIFFERENTIAL - Abnormal; Notable for the following components:    Hemoglobin 10.9 (*)     Hematocrit 34.1 (*)     RDW 14.9 (*)     All other components within normal limits   COMPREHENSIVE METABOLIC PANEL - Abnormal; Notable for the following components:    ALT 8 (*)     Anion Gap 4 (*)     All other components within normal limits   POCT URINE PREGNANCY - Abnormal; Notable for the following components:    POC Preg Test, Ur Positive (*)     All other components within normal limits   LIPASE   HCG, QUANTITATIVE    Narrative:     Release to patient->Immediate   HCG, QUANTITATIVE          Imaging Results          US OB <14 Wks TransAbd & TransVag, Single Gestation (XPD) (Final result)  Result time 03/09/22 01:55:09    Final result by Carlitos Gagnon MD (03/09/22 01:55:09)                 Impression:      Pregnancy of unknown viability.  Intrauterine gestational sac is seen with questionable small early fetal pole which would correspond with estimated gestational age of 5 weeks 0 days, although no fetal cardiac activity is detected at this time.  Recommend serial beta HCGs and repeat pelvic ultrasound in 2 weeks.      Electronically signed by: Carlitos Gagnon MD  Date:    03/09/2022  Time:    01:55             Narrative:    EXAMINATION:  US OB <14 WEEKS, TRANSABDOM & TRANSVAG, SINGLE GESTATION (XPD)    CLINICAL HISTORY:  abdominal pain;    TECHNIQUE:  Transabdominal sonography of the pelvis was performed, followed by transvaginal sonography to better evaluate the uterus and  ovaries.    COMPARISON:  None.    FINDINGS:  The uterus is retroverted and measures 7.3 x 4.0 x 6.0 cm. Uterine parenchyma is heterogenous in echotexture. In the uterine cavity there is a gestational sac with a mean diameter of 0.5 cm.  Questionable small early fetal pole is seen which measures 0.2 cm which would correspond to a 5 weeks 0 days gestation.  No fetal cardiac activity is detected at this time.  No definite yolk sac seen.    The right ovary measures 2.6 x 1.9 x 2.8 cm. The left ovary measures 2.6 x 1.7 x 3.1 cm. Arterial and venous flow are preserved bilaterally. A suspected corpus luteum cyst measuring 1.9 x 1.4 x 2.1 cm is seen in the left ovary. No significant free fluid is seen.                                 Medications   sodium chloride 0.9% bolus 1,000 mL (1,000 mLs Intravenous New Bag 3/9/22 0008)   ondansetron injection 4 mg (4 mg Intravenous Given 3/9/22 0013)   acetaminophen tablet 1,000 mg (1,000 mg Oral Given 3/9/22 0045)   metoclopramide HCl injection 10 mg (10 mg Intravenous Given 3/9/22 0046)   diphenhydrAMINE injection 25 mg (25 mg Intravenous Given 3/9/22 0047)     Medical Decision Making:   ED Management:  This is an evaluation of a 26 y.o. female who presents to the ED for constellation of symptoms consistent with early pregnancy including lower abdominal pain.  Vital signs are stable.   Afebrile.  Patient is nontoxic appearing and in no acute distress.     HPI and physical exam as above.    UPT positive.  Beta-hCG 2900. UA shows no evidence of infection.  Ultrasound shows intrauterine gestational sac without definitive cardiac activity.  Estimated gestational age of 5 weeks 0 days.    Patient reported improvement of symptoms the treatment with IV fluids, Reglan and Benadryl.    The results and physical exam findings were reviewed with the patient. Pt agrees with assessment, disposition and treatment plan and has no further questions or complaints at this time. The patient will need  to follow up with her OB/GYN as soon as possible. I have given her strict return precautions. The patient should continue taking prenatal vitamins.  I discussed the need to have pelvic rest, avoiding heavy lifting and abstaining from sexual intercourse.                        Clinical Impression:   Final diagnoses:  [Z34.90] Early stage of pregnancy (Primary)  [O26.891, R10.9] Abdominal pain during pregnancy in first trimester  [O21.9] Nausea/vomiting in pregnancy  [O26.891, R51.9] Pregnancy headache in first trimester          ED Disposition Condition    Discharge Stable        ED Prescriptions     None        Follow-up Information     Follow up With Specialties Details Why Contact Info    Harpreet Melgoza MD Obstetrics and Gynecology   [address]  [address]  [facility] [address]  [phone]      [facility] Emergency Medicine Go in 1 day If symptoms worsen [address] [facility] [id number] [phone]           Guillermo Melgoza PA-C  03/09/22 0221

## (undated) DEVICE — DRESSING COVER AQUACEL AG SURG

## (undated) DEVICE — PAD ABD 8X10 STERILE